# Patient Record
Sex: FEMALE | Race: WHITE | NOT HISPANIC OR LATINO | Employment: FULL TIME | RURAL
[De-identification: names, ages, dates, MRNs, and addresses within clinical notes are randomized per-mention and may not be internally consistent; named-entity substitution may affect disease eponyms.]

---

## 2019-10-09 RX ORDER — LISINOPRIL AND HYDROCHLOROTHIAZIDE 20; 12.5 MG/1; MG/1
TABLET ORAL
Qty: 30 TABLET | Refills: 0 | Status: SHIPPED | OUTPATIENT
Start: 2019-10-09 | End: 2019-11-14 | Stop reason: SDUPTHER

## 2019-10-09 RX ORDER — FLUOXETINE HYDROCHLORIDE 20 MG/1
CAPSULE ORAL
Qty: 90 CAPSULE | Refills: 12 | Status: SHIPPED | OUTPATIENT
Start: 2019-10-09 | End: 2019-11-14

## 2019-11-14 ENCOUNTER — OFFICE VISIT (OUTPATIENT)
Dept: FAMILY MEDICINE CLINIC | Facility: CLINIC | Age: 49
End: 2019-11-14

## 2019-11-14 VITALS
DIASTOLIC BLOOD PRESSURE: 82 MMHG | SYSTOLIC BLOOD PRESSURE: 120 MMHG | BODY MASS INDEX: 29.22 KG/M2 | OXYGEN SATURATION: 98 % | WEIGHT: 186.2 LBS | HEIGHT: 67 IN | TEMPERATURE: 98.7 F | HEART RATE: 93 BPM

## 2019-11-14 DIAGNOSIS — Z00.01 ENCOUNTER FOR GENERAL ADULT MEDICAL EXAMINATION WITH ABNORMAL FINDINGS: Primary | ICD-10-CM

## 2019-11-14 DIAGNOSIS — Z12.31 SCREENING MAMMOGRAM, ENCOUNTER FOR: ICD-10-CM

## 2019-11-14 DIAGNOSIS — F33.1 MODERATE EPISODE OF RECURRENT MAJOR DEPRESSIVE DISORDER (HCC): ICD-10-CM

## 2019-11-14 DIAGNOSIS — Z90.710 H/O TOTAL VAGINAL HYSTERECTOMY: ICD-10-CM

## 2019-11-14 DIAGNOSIS — F41.9 ANXIETY: ICD-10-CM

## 2019-11-14 DIAGNOSIS — L91.8 SKIN TAG: ICD-10-CM

## 2019-11-14 DIAGNOSIS — Z13.220 SCREENING FOR HYPERLIPIDEMIA: ICD-10-CM

## 2019-11-14 DIAGNOSIS — I10 ESSENTIAL HYPERTENSION: ICD-10-CM

## 2019-11-14 DIAGNOSIS — J30.89 NON-SEASONAL ALLERGIC RHINITIS DUE TO OTHER ALLERGIC TRIGGER: ICD-10-CM

## 2019-11-14 PROBLEM — F32.A DEPRESSION: Status: ACTIVE | Noted: 2019-11-14

## 2019-11-14 PROBLEM — R23.3 EASY BRUISING: Status: ACTIVE | Noted: 2017-10-18

## 2019-11-14 PROCEDURE — 99213 OFFICE O/P EST LOW 20 MIN: CPT | Performed by: FAMILY MEDICINE

## 2019-11-14 PROCEDURE — 99396 PREV VISIT EST AGE 40-64: CPT | Performed by: FAMILY MEDICINE

## 2019-11-14 RX ORDER — FLUTICASONE PROPIONATE 50 MCG
1 SPRAY, SUSPENSION (ML) NASAL DAILY
Qty: 16 G | Refills: 12 | Status: SHIPPED | OUTPATIENT
Start: 2019-11-14 | End: 2020-11-23 | Stop reason: SDUPTHER

## 2019-11-14 RX ORDER — FLUTICASONE PROPIONATE 50 MCG
1 SPRAY, SUSPENSION (ML) NASAL DAILY
COMMUNITY
End: 2019-11-14 | Stop reason: SDUPTHER

## 2019-11-14 RX ORDER — FLUOXETINE HYDROCHLORIDE 40 MG/1
40 CAPSULE ORAL DAILY
Qty: 30 CAPSULE | Refills: 12 | Status: SHIPPED | OUTPATIENT
Start: 2019-11-14 | End: 2020-11-23

## 2019-11-14 RX ORDER — LISINOPRIL AND HYDROCHLOROTHIAZIDE 20; 12.5 MG/1; MG/1
1 TABLET ORAL DAILY
Qty: 30 TABLET | Refills: 12 | Status: SHIPPED | OUTPATIENT
Start: 2019-11-14 | End: 2019-12-30

## 2019-11-14 NOTE — PROGRESS NOTES
"  Chief Complaint   Patient presents with   • Annual Exam   • Hypertension         Subjective   Radha Tobias is a 49 y.o. female here for a Well Woman Visit. Energy level is described as fair and she is sleeping well. She sleeps 8 hours nightly. Last pap was 2017 had historecomy . Contraception: none. Patient exercises not at all. Nutrition is described as in general, a \"healthy\" diet  . Her   libido is abnormal. She reports that she does not perform monthly self breast exam.      Hypertension   This is a chronic problem. The current episode started more than 1 year ago. The problem has been gradually improving since onset. The problem is controlled. Associated symptoms include anxiety. Pertinent negatives include no chest pain or shortness of breath. There are no associated agents to hypertension. There are no known risk factors for coronary artery disease. Past treatments include ACE inhibitors and diuretics. Current antihypertension treatment includes ACE inhibitors and diuretics. The current treatment provides significant improvement. There are no compliance problems.    Anxiety   Presents for follow-up visit. Symptoms include depressed mood, excessive worry and nervous/anxious behavior. Patient reports no chest pain, nausea, shortness of breath or suicidal ideas. The severity of symptoms is moderate. The quality of sleep is fair.     Her past medical history is significant for depression.   Depression   Visit Type: follow-up  Patient presents with the following symptoms: depressed mood, excessive worry and nervousness/anxiety.  Patient is not experiencing: feelings of hopelessness, feelings of worthlessness, shortness of breath, suicidal ideas, suicidal planning and thoughts of death.  Frequency of symptoms: occasionally   Severity: moderate   Sleep quality: fair           I personally reviewed and updated the patient's allergies, medications, problem list, and past medical, surgical, social, and family " history.     Allergies:  Allergies   Allergen Reactions   • Sulfa Antibiotics Palpitations       Social History:  Social History     Socioeconomic History   • Marital status: Single     Spouse name: Not on file   • Number of children: Not on file   • Years of education: Not on file   • Highest education level: Not on file   Tobacco Use   • Smoking status: Former Smoker     Last attempt to quit: 11/23/2009     Years since quitting: 10.0   • Smokeless tobacco: Never Used   Substance and Sexual Activity   • Alcohol use: Yes     Frequency: Monthly or less   • Drug use: No   • Sexual activity: Yes       Family History:  History reviewed. No pertinent family history.    Past Medical History :  Active Ambulatory Problems     Diagnosis Date Noted   • HTN (hypertension) 11/18/2013   • Easy bruising 10/18/2017   • Depression 11/14/2019   • Anxiety 11/14/2019   • H/O total vaginal hysterectomy 11/14/2019     Resolved Ambulatory Problems     Diagnosis Date Noted   • No Resolved Ambulatory Problems     No Additional Past Medical History       Medication List:    Current Outpatient Medications:   •  fluticasone (FLONASE) 50 MCG/ACT nasal spray, 1 spray into the nostril(s) as directed by provider Daily., Disp: 16 g, Rfl: 12  •  lisinopril-hydrochlorothiazide (PRINZIDE,ZESTORETIC) 20-12.5 MG per tablet, Take 1 tablet by mouth Daily., Disp: 30 tablet, Rfl: 12  •  FLUoxetine (PROzac) 40 MG capsule, Take 1 capsule by mouth Daily., Disp: 30 capsule, Rfl: 12    Past Surgical History:  History reviewed. No pertinent surgical history.    Depression Screen:   No flowsheet data found.    Fall Risk Screen:  [facility].    Review Of Systems:  Review of Systems   Constitutional: Negative for activity change and fever.   HENT: Negative for ear pain, rhinorrhea, sinus pressure and voice change.    Eyes: Negative for visual disturbance.   Respiratory: Negative for cough and shortness of breath.   "  Cardiovascular: Negative for chest pain.   Gastrointestinal: Negative for abdominal pain, diarrhea, nausea and vomiting.   Endocrine: Negative for cold intolerance and heat intolerance.   Genitourinary: Negative for frequency and urgency.   Musculoskeletal: Negative for arthralgias.   Skin: Negative for rash.   Neurological: Negative for syncope.   Hematological: Does not bruise/bleed easily.   Psychiatric/Behavioral: Positive for depressed mood. Negative for suicidal ideas. The patient is nervous/anxious.        Physical Exam:  Vital Signs:  Visit Vitals  /82   Pulse 93   Temp 98.7 °F (37.1 °C) (Oral)   Ht 170.2 cm (67.01\")   Wt 84.5 kg (186 lb 3.2 oz)   SpO2 98%   BMI 29.16 kg/m²       Physical Exam   Constitutional: She is oriented to person, place, and time. She appears well-developed and well-nourished. No distress.   HENT:   Head: Normocephalic and atraumatic.   Right Ear: External ear normal. No drainage or swelling. Tympanic membrane is not erythematous. No middle ear effusion. cerumen impaction is not present.  Left Ear: External ear normal. No drainage or swelling. Tympanic membrane is not erythematous.  No middle ear effusion. An impacted cerumen is not present.  Nose: Nose normal.   Mouth/Throat: Oropharynx is clear and moist. No oropharyngeal exudate.   Eyes: Conjunctivae and EOM are normal. Pupils are equal, round, and reactive to light. No scleral icterus.   Neck: Normal range of motion. Neck supple. No tracheal deviation present. No thyromegaly present.   Cardiovascular: Normal rate, regular rhythm, normal heart sounds and intact distal pulses. Exam reveals no friction rub.   No murmur heard.  Pulmonary/Chest: Effort normal and breath sounds normal. No stridor. No respiratory distress. She has no wheezes. She has no rales. Right breast exhibits no inverted nipple, no mass, no nipple discharge, no skin change and no tenderness. Left breast exhibits no inverted nipple, no mass, no nipple " discharge, no skin change and no tenderness.   Abdominal: Soft. Bowel sounds are normal. She exhibits no distension and no mass. There is no tenderness. No hernia.   Genitourinary: Rectum normal. Rectal exam shows guaiac negative stool.       Uterus is absent.   Cervix is absent. Right adnexum is absent.Left adnexum is absent.No erythema or tenderness in the vagina. No vaginal discharge found.   Musculoskeletal: Normal range of motion. She exhibits no edema, tenderness or deformity.   Lymphadenopathy:     She has no cervical adenopathy.   Neurological: She is alert and oriented to person, place, and time. She has normal reflexes. She displays normal reflexes. No cranial nerve deficit or sensory deficit. She exhibits normal muscle tone. Coordination and gait normal.   Skin: Skin is warm and dry. No rash noted. She is not diaphoretic.   Psychiatric: She has a normal mood and affect. Her behavior is normal.   Vitals reviewed.        Assessment and Plan:  Problem List Items Addressed This Visit        Cardiovascular and Mediastinum    HTN (hypertension)    Relevant Medications    lisinopril-hydrochlorothiazide (PRINZIDE,ZESTORETIC) 20-12.5 MG per tablet       Other    Depression    Relevant Medications    FLUoxetine (PROzac) 40 MG capsule    Anxiety    Relevant Medications    FLUoxetine (PROzac) 40 MG capsule    H/O total vaginal hysterectomy    Overview     For fibroids, one ovary on right she thinks.   2/2017           Other Visit Diagnoses     Encounter for general adult medical examination with abnormal findings    -  Primary    Relevant Orders    CBC & Differential    Comprehensive Metabolic Panel    TSH    Screening mammogram, encounter for        Relevant Orders    Mammo Screening Digital Tomosynthesis Bilateral With CAD    CHG SCREENING MAMMOGRAPHY BI 2-VIEW BREAST INC CAD    Screening for hyperlipidemia        Relevant Orders    Lipid Panel With / Chol / HDL Ratio    Non-seasonal allergic rhinitis due to other  allergic trigger        Relevant Medications    fluticasone (FLONASE) 50 MCG/ACT nasal spray    Skin tag        left vulva        Increase prozac  Good social support, no suicidal or homicidal ideation. Diagnosis and treatment discussed. Discussed counseling. Discussed medication, dosing and adverse effects. Return in 4 weeks      An After Visit Summary and PPPS were given to the patient.       Discussed wearing sunscreen, seatbelts. Avoidance or caution with alcohol. Safe sex practices discussed. Discussed screening as appropriate for age.

## 2019-12-10 ENCOUNTER — APPOINTMENT (OUTPATIENT)
Dept: MAMMOGRAPHY | Facility: HOSPITAL | Age: 49
End: 2019-12-10

## 2019-12-11 ENCOUNTER — HOSPITAL ENCOUNTER (OUTPATIENT)
Dept: OTHER | Facility: HOSPITAL | Age: 49
Discharge: HOME | End: 2019-12-11

## 2019-12-11 DIAGNOSIS — Z00.6 EXAMINATION FOR NORMAL COMPARISON OR CONTROL IN CLINICAL RESEARCH: ICD-10-CM

## 2019-12-17 ENCOUNTER — OFFICE VISIT (OUTPATIENT)
Dept: FAMILY MEDICINE CLINIC | Facility: CLINIC | Age: 49
End: 2019-12-17

## 2019-12-17 VITALS
WEIGHT: 185 LBS | RESPIRATION RATE: 14 BRPM | DIASTOLIC BLOOD PRESSURE: 85 MMHG | OXYGEN SATURATION: 99 % | BODY MASS INDEX: 29.73 KG/M2 | TEMPERATURE: 97.9 F | HEIGHT: 66 IN | SYSTOLIC BLOOD PRESSURE: 128 MMHG | HEART RATE: 92 BPM

## 2019-12-17 DIAGNOSIS — J01.00 ACUTE NON-RECURRENT MAXILLARY SINUSITIS: Primary | ICD-10-CM

## 2019-12-17 PROCEDURE — 99213 OFFICE O/P EST LOW 20 MIN: CPT | Performed by: FAMILY MEDICINE

## 2019-12-17 RX ORDER — CEPHALEXIN 500 MG/1
500 CAPSULE ORAL 3 TIMES DAILY
Qty: 30 CAPSULE | Refills: 0 | Status: SHIPPED | OUTPATIENT
Start: 2019-12-17 | End: 2020-07-06

## 2019-12-17 NOTE — PROGRESS NOTES
Subjective   Radha Tobias is a 49 y.o. female.     Chief Complaint   Patient presents with   • Sinus Problem       Sinus Problem   This is a new problem. The current episode started in the past 7 days. The problem has been gradually worsening since onset. There has been no fever. Associated symptoms include ear pain and headaches. Pertinent negatives include no chills, coughing, sinus pressure, sneezing, sore throat or swollen glands. Past treatments include oral decongestants (Mucinex). The treatment provided mild relief.        The following portions of the patient's history were reviewed and updated as appropriate: allergies, current medications, past family history, past medical history, past social history, past surgical history and problem list.    Allergies:  Allergies   Allergen Reactions   • Sulfa Antibiotics Palpitations       Social History:  Social History     Socioeconomic History   • Marital status: Single     Spouse name: Not on file   • Number of children: Not on file   • Years of education: Not on file   • Highest education level: Not on file   Tobacco Use   • Smoking status: Former Smoker     Last attempt to quit: 11/23/2009     Years since quitting: 10.0   • Smokeless tobacco: Never Used   Substance and Sexual Activity   • Alcohol use: Yes     Frequency: Monthly or less   • Drug use: No   • Sexual activity: Yes       Family History:  History reviewed. No pertinent family history.    Past Medical History :  Patient Active Problem List   Diagnosis   • HTN (hypertension)   • Easy bruising   • Depression   • Anxiety   • H/O total vaginal hysterectomy       Medication List:    Current Outpatient Medications:   •  FLUoxetine (PROzac) 40 MG capsule, Take 1 capsule by mouth Daily., Disp: 30 capsule, Rfl: 12  •  fluticasone (FLONASE) 50 MCG/ACT nasal spray, 1 spray into the nostril(s) as directed by provider Daily., Disp: 16 g, Rfl: 12  •  lisinopril-hydrochlorothiazide (PRINZIDE,ZESTORETIC) 20-12.5 MG  "per tablet, Take 1 tablet by mouth Daily., Disp: 30 tablet, Rfl: 12  •  cephalexin (KEFLEX) 500 MG capsule, Take 1 capsule by mouth 3 (Three) Times a Day., Disp: 30 capsule, Rfl: 0    Past Surgical History:  History reviewed. No pertinent surgical history.    Review of Systems:  Review of Systems   Constitutional: Negative for chills and fever.   HENT: Positive for ear pain. Negative for postnasal drip, rhinorrhea, sinus pressure, sneezing, sore throat, swollen glands and trouble swallowing.    Eyes: Negative for pain, redness and itching.   Respiratory: Negative for cough and wheezing.        Physical Exam:  Vital Signs:  Visit Vitals  /85   Pulse 92   Temp 97.9 °F (36.6 °C) (Oral)   Resp 14   Ht 167.6 cm (66\")   Wt 83.9 kg (185 lb)   SpO2 99%   BMI 29.86 kg/m²       Physical Exam   Constitutional: She appears well-developed and well-nourished.   HENT:   Head: Normocephalic and atraumatic.   Right Ear: External ear normal. Tympanic membrane is not injected, not erythematous, not retracted and not bulging. No middle ear effusion.   Left Ear: External ear normal. Tympanic membrane is not injected, not erythematous, not retracted and not bulging.  No middle ear effusion.   Nose: Nose normal. No rhinorrhea.   Mouth/Throat: Oropharynx is clear and moist. No oropharyngeal exudate.   Cardiovascular: Regular rhythm and normal heart sounds. Exam reveals no gallop and no friction rub.   No murmur heard.  Pulmonary/Chest: Effort normal and breath sounds normal. No respiratory distress. She has no wheezes. She has no rales.   Lymphadenopathy:     She has no cervical adenopathy.   Neurological: She is alert.   Skin: Skin is warm and dry.   Vitals reviewed.      Assessment and Plan:  Problem List Items Addressed This Visit     None      Visit Diagnoses     Acute non-recurrent maxillary sinusitis    -  Primary    Relevant Medications    cephalexin (KEFLEX) 500 MG capsule        increase fluids, tylenol for fever, motrin for " pain. Humidifier to help with congestion and to sleep at night. Dicussed OTC meds, gargle with warm salt water. If there is recurrent fever, shortness of breath, lethargy, advised to come in to the office or go to the ER.      An After Visit Summary and PPPS were given to the patient.

## 2019-12-29 DIAGNOSIS — I10 ESSENTIAL HYPERTENSION: ICD-10-CM

## 2019-12-30 RX ORDER — LISINOPRIL AND HYDROCHLOROTHIAZIDE 20; 12.5 MG/1; MG/1
1 TABLET ORAL DAILY
Qty: 90 TABLET | Refills: 3 | Status: SHIPPED | OUTPATIENT
Start: 2019-12-30 | End: 2021-01-12 | Stop reason: SDUPTHER

## 2020-01-20 ENCOUNTER — PROCEDURE VISIT (OUTPATIENT)
Dept: FAMILY MEDICINE CLINIC | Facility: CLINIC | Age: 50
End: 2020-01-20

## 2020-01-20 VITALS
SYSTOLIC BLOOD PRESSURE: 137 MMHG | BODY MASS INDEX: 32.44 KG/M2 | HEIGHT: 64 IN | DIASTOLIC BLOOD PRESSURE: 85 MMHG | OXYGEN SATURATION: 97 % | WEIGHT: 190 LBS | RESPIRATION RATE: 16 BRPM | HEART RATE: 95 BPM | TEMPERATURE: 98.9 F

## 2020-01-20 DIAGNOSIS — L91.8 INFLAMED SKIN TAG: Primary | ICD-10-CM

## 2020-01-20 PROCEDURE — 11200 RMVL SKIN TAGS UP TO&INC 15: CPT | Performed by: FAMILY MEDICINE

## 2020-01-20 RX ORDER — CEPHALEXIN 500 MG/1
500 CAPSULE ORAL 3 TIMES DAILY
Qty: 30 CAPSULE | Refills: 0 | Status: SHIPPED | OUTPATIENT
Start: 2020-01-20 | End: 2020-07-06

## 2020-01-20 NOTE — PROGRESS NOTES
Lesion:   Radha Tobias is here today for a lesion. The lesion appeared gradual and has been occurring for  month(s) ago.. The course has been stable. The lesion is characterized as no sign of infection but it is irritated and inflamed. The lesion is located over groin. The symptoms have been associated with pain. Left labia    Review of Systems   Skin: Positive for skin lesions.       Physical Exam   Genitourinary:               Excision Procedure Note    Pre-operative Diagnosis: irritated skin tag    Post-operative Diagnosis: normal    Locations: left lower labia    Indications: irritation, inflammation    Anesthesia: not required     Procedure Details   The risks, benefits, indications, potential complications, and alternatives were explained to the patient and informed consent obtained.    The lesion and surrounding area was given a sterile prep using betadyne and draped in the usual sterile fashion. Surgical scissors used to cut the thin stalk of the skin tag. The wound was cauterized with silver nitrate. Antibiotic ointment and a sterile dressing applied/maxi pad. The specimen was not sent for pathologic examination. The patient tolerated the procedure well.    EBL: minimal      Condition: Stable    Complications:  None    Plan:  1. Instructed to keep the wound dry and covered for 24-48 hours and clean thereafter.  2. Warning signs of infection were reviewed.    3. Recommended that the patient use OTC acetaminophen, OTC analgesics and OTC ibuprofen as needed for pain.       Problem List Items Addressed This Visit     None      Visit Diagnoses     Inflamed skin tag    -  Primary    left lower labia, antibiotic sent because of location. Recheck 1 week    Relevant Medications    cephalexin (KEFLEX) 500 MG capsule    Other Relevant Orders    Reference Histopathology

## 2020-01-20 NOTE — PATIENT INSTRUCTIONS
Excision of Skin Lesions, Care After  This sheet gives you information about how to care for yourself after your procedure. Your health care provider may also give you more specific instructions. If you have problems or questions, contact your health care provider.  What can I expect after the procedure?  After your procedure, it is common to have pain or discomfort at the excision site.  Follow these instructions at home:  Excision care    · Follow instructions from your health care provider about how to take care of your excision site. Make sure you:  ? Wash your hands with soap and water before and after you change your bandage (dressing). If soap and water are not available, use hand .  ? Change your dressing as told by your health care provider.  ? Leave stitches (sutures), skin glue, or adhesive strips in place. These skin closures may need to stay in place for 2 weeks or longer. If adhesive strip edges start to loosen and curl up, you may trim the loose edges. Do not remove adhesive strips completely unless your health care provider tells you to do that.  · Check the excision area every day for signs of infection. Watch for:  ? Redness, swelling, or pain.  ? Fluid or blood.  ? Warmth.  ? Pus or a bad smell.  · Keep the site clean, dry, and protected for at least 48 hours.  · For bleeding, apply gentle but firm pressure to the area using a folded towel for 20 minutes.  · Avoid high-impact exercise and activities until the sutures are removed or the area heals.  General instructions  · Take over-the-counter and prescription medicines only as told by your health care provider.  · Follow instructions from your health care provider about how to minimize scarring. Scarring should lessen over time.  · Avoid sun exposure until the area has healed. Use sunscreen to protect the area from the sun after it has healed.  · Keep all follow-up visits as told by your health care provider. This is important.  Contact  a health care provider if:  · You have redness, swelling, or pain around your excision site.  · You have fluid or blood coming from your excision site.  · Your excision site feels warm to the touch.  · You have pus or a bad smell coming from your excision site.  · You have a fever.  · You have pain that does not improve in 2-3 days after your procedure.  · You notice skin irregularities or changes in how you feel (sensation).  Summary  · This sheet of instructions provides you with information about caring for yourself after your procedure. Contact your health care provider if you have any problems or questions.  · Take over-the-counter and prescription medicines only as told by your health care provider.  · Change your dressing as told by your health care provider.  · Contact a health care provider if you have redness, swelling, pain, or other signs of infection around your excision site.  · Keep all follow-up visits as told by your health care provider. This is important.  This information is not intended to replace advice given to you by your health care provider. Make sure you discuss any questions you have with your health care provider.  Document Released: 05/03/2016 Document Revised: 06/26/2019 Document Reviewed: 06/26/2019  [employer] Patient Education © 2019 [employer]

## 2020-01-27 ENCOUNTER — OFFICE VISIT (OUTPATIENT)
Dept: FAMILY MEDICINE CLINIC | Facility: CLINIC | Age: 50
End: 2020-01-27

## 2020-01-27 VITALS
WEIGHT: 189 LBS | BODY MASS INDEX: 32.27 KG/M2 | OXYGEN SATURATION: 98 % | SYSTOLIC BLOOD PRESSURE: 136 MMHG | DIASTOLIC BLOOD PRESSURE: 88 MMHG | TEMPERATURE: 98.2 F | HEIGHT: 64 IN | HEART RATE: 89 BPM | RESPIRATION RATE: 18 BRPM

## 2020-01-27 DIAGNOSIS — L91.8 INFLAMED SKIN TAG: Primary | ICD-10-CM

## 2020-01-27 DIAGNOSIS — B37.31 CANDIDA VAGINITIS: ICD-10-CM

## 2020-01-27 PROCEDURE — 99213 OFFICE O/P EST LOW 20 MIN: CPT | Performed by: FAMILY MEDICINE

## 2020-01-27 RX ORDER — FLUCONAZOLE 150 MG/1
150 TABLET ORAL ONCE
Qty: 1 TABLET | Refills: 2 | Status: SHIPPED | OUTPATIENT
Start: 2020-01-27 | End: 2020-01-27

## 2020-01-27 NOTE — PROGRESS NOTES
Subjective   Radha Tobias is a 49 y.o. female.     Chief Complaint   Patient presents with   • Skin Problem       Skin Problem   This is a new problem. The current episode started more than 1 year ago. The problem occurs daily. The problem has been resolved. Pertinent negatives include no abdominal pain, arthralgias, chest pain, chills, congestion, coughing, headaches, joint swelling, myalgias, nausea, neck pain, numbness, sore throat, swollen glands, urinary symptoms, visual change, vomiting or weakness. Nothing aggravates the symptoms. Treatments tried: skin tag removed from LT labia. The treatment provided significant relief.   Vaginitis   This is a new problem. The current episode started in the past 7 days. The problem occurs constantly. The problem has been unchanged. Pertinent negatives include no abdominal pain, arthralgias, chest pain, chills, congestion, coughing, headaches, joint swelling, myalgias, nausea, neck pain, numbness, sore throat, swollen glands, urinary symptoms, visual change, vomiting or weakness. Exacerbated by: recent lesion removal. She has tried nothing for the symptoms.        The following portions of the patient's history were reviewed and updated as appropriate: allergies, current medications, past family history, past medical history, past social history, past surgical history and problem list.    Allergies:  Allergies   Allergen Reactions   • Sulfa Antibiotics Palpitations       Social History:  Social History     Socioeconomic History   • Marital status: Single     Spouse name: Not on file   • Number of children: Not on file   • Years of education: Not on file   • Highest education level: Not on file   Tobacco Use   • Smoking status: Former Smoker     Last attempt to quit: 11/23/2009     Years since quitting: 10.1   • Smokeless tobacco: Never Used   Substance and Sexual Activity   • Alcohol use: Yes     Frequency: Monthly or less   • Drug use: No   • Sexual activity: Yes  "      Family History:  History reviewed. No pertinent family history.    Past Medical History :  Patient Active Problem List   Diagnosis   • HTN (hypertension)   • Easy bruising   • Depression   • Anxiety   • H/O total vaginal hysterectomy       Medication List:    Current Outpatient Medications:   •  cephalexin (KEFLEX) 500 MG capsule, Take 1 capsule by mouth 3 (Three) Times a Day., Disp: 30 capsule, Rfl: 0  •  FLUoxetine (PROzac) 40 MG capsule, Take 1 capsule by mouth Daily., Disp: 30 capsule, Rfl: 12  •  fluticasone (FLONASE) 50 MCG/ACT nasal spray, 1 spray into the nostril(s) as directed by provider Daily., Disp: 16 g, Rfl: 12  •  lisinopril-hydrochlorothiazide (PRINZIDE,ZESTORETIC) 20-12.5 MG per tablet, TAKE 1 TABLET BY MOUTH DAILY, Disp: 90 tablet, Rfl: 3  •  cephalexin (KEFLEX) 500 MG capsule, Take 1 capsule by mouth 3 (Three) Times a Day., Disp: 30 capsule, Rfl: 0    Past Surgical History:  History reviewed. No pertinent surgical history.    Review of Systems:  Review of Systems   Constitutional: Negative for chills.   HENT: Negative for congestion, sore throat and swollen glands.    Respiratory: Negative for cough.    Cardiovascular: Negative for chest pain.   Gastrointestinal: Negative for abdominal pain, nausea and vomiting.   Musculoskeletal: Negative for arthralgias, joint swelling, myalgias and neck pain.   Neurological: Negative for weakness and numbness.       Physical Exam:  Vital Signs:  Visit Vitals  /88   Pulse 89   Temp 98.2 °F (36.8 °C) (Oral)   Resp 18   Ht 162.6 cm (64.02\")   Wt 85.7 kg (189 lb)   SpO2 98%   BMI 32.43 kg/m²       Physical Exam   Constitutional: She is oriented to person, place, and time. She appears well-developed and well-nourished. She is cooperative.   Cardiovascular: Normal rate, regular rhythm and normal heart sounds. Exam reveals no gallop and no friction rub.   No murmur heard.  Pulmonary/Chest: Effort normal and breath sounds normal. She has no wheezes. She has " no rales.   Genitourinary:       There is erythema in the vagina.   Genitourinary Comments: Erythema at introitus   Neurological: She is alert and oriented to person, place, and time. Coordination normal.   Skin: Skin is warm and dry.   Psychiatric: She has a normal mood and affect.   Vitals reviewed.      Assessment and Plan:  Problem List Items Addressed This Visit     None      Visit Diagnoses     Inflamed skin tag    -  Primary          An After Visit Summary and PPPS were given to the patient.

## 2020-04-11 PROCEDURE — U0003 INFECTIOUS AGENT DETECTION BY NUCLEIC ACID (DNA OR RNA); SEVERE ACUTE RESPIRATORY SYNDROME CORONAVIRUS 2 (SARS-COV-2) (CORONAVIRUS DISEASE [COVID-19]), AMPLIFIED PROBE TECHNIQUE, MAKING USE OF HIGH THROUGHPUT TECHNOLOGIES AS DESCRIBED BY CMS-2020-01-R: HCPCS | Performed by: FAMILY MEDICINE

## 2020-04-11 PROCEDURE — 87635 SARS-COV-2 COVID-19 AMP PRB: CPT | Performed by: FAMILY MEDICINE

## 2020-04-14 ENCOUNTER — TELEPHONE (OUTPATIENT)
Dept: URGENT CARE | Facility: CLINIC | Age: 50
End: 2020-04-14

## 2020-04-14 NOTE — TELEPHONE ENCOUNTER
Spoke with Radha. Informed her that Covid test was positive. Patient stated she still had the same symptoms she had during her visit here and stated they were about the same in severity. Patient informed to continue quarantine as directed during discharge. Verbalized understanding and denied any questions.

## 2020-07-06 ENCOUNTER — TELEPHONE (OUTPATIENT)
Dept: FAMILY MEDICINE CLINIC | Facility: CLINIC | Age: 50
End: 2020-07-06

## 2020-07-06 ENCOUNTER — HOSPITAL ENCOUNTER (OUTPATIENT)
Dept: GENERAL RADIOLOGY | Facility: HOSPITAL | Age: 50
Discharge: HOME | End: 2020-07-06
Admitting: FAMILY MEDICINE

## 2020-07-06 ENCOUNTER — OFFICE VISIT (OUTPATIENT)
Dept: FAMILY MEDICINE CLINIC | Facility: CLINIC | Age: 50
End: 2020-07-06

## 2020-07-06 VITALS
HEART RATE: 102 BPM | WEIGHT: 186.6 LBS | RESPIRATION RATE: 17 BRPM | OXYGEN SATURATION: 98 % | HEIGHT: 66 IN | DIASTOLIC BLOOD PRESSURE: 84 MMHG | BODY MASS INDEX: 29.99 KG/M2 | TEMPERATURE: 98.3 F | SYSTOLIC BLOOD PRESSURE: 138 MMHG

## 2020-07-06 DIAGNOSIS — K21.9 GASTROESOPHAGEAL REFLUX DISEASE, ESOPHAGITIS PRESENCE NOT SPECIFIED: Primary | ICD-10-CM

## 2020-07-06 DIAGNOSIS — R07.9 CHEST PAIN, UNSPECIFIED TYPE: ICD-10-CM

## 2020-07-06 DIAGNOSIS — R07.89 OTHER CHEST PAIN: ICD-10-CM

## 2020-07-06 PROCEDURE — 71046 X-RAY EXAM CHEST 2 VIEWS: CPT

## 2020-07-06 PROCEDURE — 99214 OFFICE O/P EST MOD 30 MIN: CPT | Performed by: FAMILY MEDICINE

## 2020-07-06 RX ORDER — OMEPRAZOLE 40 MG/1
40 CAPSULE, DELAYED RELEASE ORAL DAILY
Qty: 30 CAPSULE | Refills: 12 | Status: SHIPPED | OUTPATIENT
Start: 2020-07-06 | End: 2021-08-13 | Stop reason: SDUPTHER

## 2020-07-06 NOTE — PROGRESS NOTES
Subjective   Radha Tobias is a 50 y.o. female.     Chief Complaint   Patient presents with   • Chest Pain       Chest Pain    This is a new problem. The current episode started yesterday. The onset quality is sudden. The problem occurs intermittently. The problem has been gradually improving. The pain is present in the substernal region. The quality of the pain is described as dull. The pain radiates to the upper back, right jaw and right neck. Pertinent negatives include no abdominal pain, cough, fever, nausea, shortness of breath or vomiting. The pain is aggravated by nothing. She has tried antacids for the symptoms. The treatment provided no relief.   Her family medical history is significant for heart disease.        The following portions of the patient's history were reviewed and updated as appropriate: allergies, current medications, past family history, past medical history, past social history, past surgical history and problem list.    Allergies:  Allergies   Allergen Reactions   • Sulfa Antibiotics Palpitations       Social History:  Social History     Socioeconomic History   • Marital status: Single     Spouse name: Not on file   • Number of children: Not on file   • Years of education: Not on file   • Highest education level: Not on file   Tobacco Use   • Smoking status: Former Smoker     Last attempt to quit: 11/23/2009     Years since quitting: 10.6   • Smokeless tobacco: Never Used   Substance and Sexual Activity   • Alcohol use: Yes     Frequency: Monthly or less     Comment: weekly   • Drug use: No   • Sexual activity: Yes       Family History:  History reviewed. No pertinent family history.    Past Medical History :  Patient Active Problem List   Diagnosis   • HTN (hypertension)   • Easy bruising   • Depression   • Anxiety   • H/O total vaginal hysterectomy   • GERD (gastroesophageal reflux disease)   • Other chest pain       Medication List:  Outpatient Encounter Medications as of 7/6/2020  "  Medication Sig Dispense Refill   • FLUoxetine (PROzac) 40 MG capsule Take 1 capsule by mouth Daily. 30 capsule 12   • fluticasone (FLONASE) 50 MCG/ACT nasal spray 1 spray into the nostril(s) as directed by provider Daily. 16 g 12   • lisinopril-hydrochlorothiazide (PRINZIDE,ZESTORETIC) 20-12.5 MG per tablet TAKE 1 TABLET BY MOUTH DAILY 90 tablet 3   • omeprazole (priLOSEC) 40 MG capsule Take 1 capsule by mouth Daily. 30 capsule 12   • [DISCONTINUED] cephalexin (KEFLEX) 500 MG capsule Take 1 capsule by mouth 3 (Three) Times a Day. 30 capsule 0   • [DISCONTINUED] cephalexin (KEFLEX) 500 MG capsule Take 1 capsule by mouth 3 (Three) Times a Day. 30 capsule 0     No facility-administered encounter medications on file as of 7/6/2020.        Past Surgical History:  Past Surgical History:   Procedure Laterality Date   • APPENDECTOMY     • HYSTERECTOMY     • TUBAL ABDOMINAL LIGATION         Review of Systems:  Review of Systems   Constitutional: Negative for activity change and fever.   HENT: Negative for ear pain, rhinorrhea, sinus pressure and voice change.    Eyes: Negative for visual disturbance.   Respiratory: Negative for cough and shortness of breath.    Cardiovascular: Positive for chest pain.   Gastrointestinal: Negative for abdominal pain, diarrhea, nausea and vomiting.   Endocrine: Negative for cold intolerance and heat intolerance.   Genitourinary: Negative for frequency and urgency.   Musculoskeletal: Negative for arthralgias.   Skin: Negative for rash.   Neurological: Negative for syncope.   Hematological: Does not bruise/bleed easily.   Psychiatric/Behavioral: Negative for depressed mood. The patient is not nervous/anxious.        I have reviewed and confirmed the accuracy of the ROS as documented by the MA/LPN/RN Julianne Glez MD    Vital Signs:  Visit Vitals  /84   Pulse 102   Temp 98.3 °F (36.8 °C)   Resp 17   Ht 167.6 cm (66\")   Wt 84.6 kg (186 lb 9.6 oz)   SpO2 98%   BMI 30.12 kg/m² "         Physical Exam   Constitutional: She is oriented to person, place, and time. She appears well-developed and well-nourished. She is cooperative.   Cardiovascular: Normal rate, regular rhythm and normal heart sounds. Exam reveals no gallop and no friction rub.   No murmur heard.  Pulmonary/Chest: Effort normal and breath sounds normal. She has no wheezes. She has no rales.   Abdominal: Soft. Bowel sounds are normal. She exhibits no distension. There is no tenderness. There is no rebound and no guarding.   Neurological: She is alert and oriented to person, place, and time. Coordination normal.   Skin: Skin is warm and dry.   Psychiatric: She has a normal mood and affect.   Vitals reviewed.      ECG 12 Lead  Date/Time: 7/12/2020 8:02 PM  Performed by: Julianne Glez MD  Authorized by: Julianne Glez MD   Previous ECG: no previous ECG available  Rhythm: sinus rhythm  Rate: normal  Conduction: conduction normal  ST Segments: ST segments normal  T Waves: T waves normal  QRS axis: normal  Other: no other findings    Clinical impression: normal ECG            Assessment and Plan:  Problem List Items Addressed This Visit        Digestive    GERD (gastroesophageal reflux disease) - Primary    Overview     May be cause of chest pain.   Limit tobacco, alcohol, caffeine, chocalate, citrus fruits, recumbency after meals and large portions. Discussed link between PPI's and increased risk of hip, wrist, and spine fractures           Relevant Medications    omeprazole (priLOSEC) 40 MG capsule       Nervous and Auditory    Other chest pain    Current Assessment & Plan     Labs, EKG were negative. No s/s of acute coronary syndrome. Will order stress test         Relevant Orders    Troponin I (Completed)    CK Total & CKMB (Completed)    ECG 12 Lead    XR Chest 2 View (Completed)    Treadmill Stress Test        I wore protective equipment throughout this patient encounter to include mask and gloves. Hand hygiene was  performed before donning protective equipment and after removal when leaving the room.      An After Visit Summary and PPPS were given to the patient.

## 2020-07-12 PROCEDURE — 93000 ELECTROCARDIOGRAM COMPLETE: CPT | Performed by: FAMILY MEDICINE

## 2020-11-23 ENCOUNTER — E-VISIT (OUTPATIENT)
Dept: FAMILY MEDICINE CLINIC | Facility: CLINIC | Age: 50
End: 2020-11-23

## 2020-11-23 ENCOUNTER — TELEPHONE (OUTPATIENT)
Dept: FAMILY MEDICINE CLINIC | Facility: CLINIC | Age: 50
End: 2020-11-23

## 2020-11-23 DIAGNOSIS — J30.89 NON-SEASONAL ALLERGIC RHINITIS DUE TO OTHER ALLERGIC TRIGGER: ICD-10-CM

## 2020-11-23 DIAGNOSIS — J01.80 ACUTE NON-RECURRENT SINUSITIS OF OTHER SINUS: Primary | ICD-10-CM

## 2020-11-23 DIAGNOSIS — F41.9 ANXIETY: ICD-10-CM

## 2020-11-23 PROBLEM — J01.90 ACUTE SINUSITIS, UNSPECIFIED: Status: ACTIVE | Noted: 2020-11-23

## 2020-11-23 PROCEDURE — 99421 OL DIG E/M SVC 5-10 MIN: CPT | Performed by: FAMILY MEDICINE

## 2020-11-23 RX ORDER — CEPHALEXIN 500 MG/1
500 CAPSULE ORAL 3 TIMES DAILY
Qty: 30 CAPSULE | Refills: 0 | Status: SHIPPED | OUTPATIENT
Start: 2020-11-23 | End: 2020-12-07

## 2020-11-23 RX ORDER — FLUOXETINE HYDROCHLORIDE 40 MG/1
40 CAPSULE ORAL DAILY
Qty: 30 CAPSULE | Refills: 12 | Status: SHIPPED | OUTPATIENT
Start: 2020-11-23 | End: 2021-03-23

## 2020-11-23 RX ORDER — FLUTICASONE PROPIONATE 50 MCG
1 SPRAY, SUSPENSION (ML) NASAL DAILY
Qty: 16 G | Refills: 12 | Status: SHIPPED | OUTPATIENT
Start: 2020-11-23 | End: 2021-12-17

## 2020-11-23 NOTE — PROGRESS NOTES
Radha Tobias    1970  [id number]    I have reviewed the e-Visit questionnaire and patient's answers, my assessment and plan are as follows:        Symptoms are consistent with sinusitis.       Problems Addressed this Visit        Respiratory    Acute sinusitis, unspecified - Primary    Relevant Medications    cephalexin (KEFLEX) 500 MG capsule      Diagnoses       Codes Comments    Acute non-recurrent sinusitis of other sinus    -  Primary ICD-10-CM: J01.80  ICD-9-CM: 461.8          increase fluids, tylenol for fever, motrin for pain. Humidifier to help with congestion and to sleep at night. Dicussed OTC meds, gargle with warm salt water. If there is recurrent fever, shortness of breath, lethargy, advised to come in to the office or go to the ER.      Time spent 5 minutes      Any medications prescribed have been sent electronically to   [id number], [place] - [address] [facility] [address] [facility] - [phone]  - [phone]   [address] [facility] [place] [address]  Phone: [phone] Fax: [phone]    [facility] [address] [place] [address], [place] - [address] - [phone]  - [phone] [address]  [address]  [place] [address]  Phone: [phone] Fax: [phone]        Julianne Glez MD  11/23/20  17:48 EST

## 2020-12-07 ENCOUNTER — OFFICE VISIT (OUTPATIENT)
Dept: FAMILY MEDICINE CLINIC | Facility: CLINIC | Age: 50
End: 2020-12-07

## 2020-12-07 VITALS
OXYGEN SATURATION: 99 % | SYSTOLIC BLOOD PRESSURE: 114 MMHG | RESPIRATION RATE: 16 BRPM | HEIGHT: 66 IN | HEART RATE: 84 BPM | WEIGHT: 187.4 LBS | DIASTOLIC BLOOD PRESSURE: 70 MMHG | BODY MASS INDEX: 30.12 KG/M2 | TEMPERATURE: 98.2 F

## 2020-12-07 DIAGNOSIS — L50.9 URTICARIA: Primary | ICD-10-CM

## 2020-12-07 DIAGNOSIS — H65.03 NON-RECURRENT ACUTE SEROUS OTITIS MEDIA OF BOTH EARS: ICD-10-CM

## 2020-12-07 PROBLEM — J01.90 ACUTE SINUSITIS, UNSPECIFIED: Status: RESOLVED | Noted: 2020-11-23 | Resolved: 2020-12-07

## 2020-12-07 PROBLEM — R07.89 OTHER CHEST PAIN: Status: RESOLVED | Noted: 2020-07-06 | Resolved: 2020-12-07

## 2020-12-07 PROCEDURE — 99213 OFFICE O/P EST LOW 20 MIN: CPT | Performed by: FAMILY MEDICINE

## 2020-12-07 RX ORDER — AZITHROMYCIN 250 MG/1
TABLET, FILM COATED ORAL
Qty: 6 TABLET | Refills: 0 | Status: SHIPPED | OUTPATIENT
Start: 2020-12-07 | End: 2021-02-23

## 2020-12-07 RX ORDER — PREDNISONE 1 MG/1
5 TABLET ORAL DAILY
Qty: 45 TABLET | Refills: 0 | Status: SHIPPED | OUTPATIENT
Start: 2020-12-07 | End: 2021-02-23

## 2020-12-07 NOTE — PROGRESS NOTES
Subjective   Radha Tobias is a 50 y.o. female.     Chief Complaint   Patient presents with   • Facial Swelling   • Urticaria       Facial Swelling  This is a new problem. The current episode started in the past 7 days (4 nights since Friday night ). The problem occurs intermittently. The problem has been waxing and waning. Associated symptoms include congestion and fatigue. Pertinent negatives include no abdominal pain, arthralgias, chest pain, coughing, fever, headaches, nausea, rash, sore throat, swollen glands, vertigo, visual change or vomiting. Nothing aggravates the symptoms. Treatments tried: Benadryl. The treatment provided no relief.   Urticaria  This is a new problem. The current episode started in the past 7 days (4 nights since Friday night ). The problem has been waxing and waning since onset. The affected locations include the face, torso, neck, right buttock, left buttock, back and abdomen. The rash is characterized by itchiness, redness and swelling. It is unknown if there was an exposure to a precipitant. Associated symptoms include congestion and fatigue. Pertinent negatives include no cough, diarrhea, fever, rhinorrhea, shortness of breath, sore throat or vomiting. Past treatments include antihistamine. The treatment provided no relief.        The following portions of the patient's history were reviewed and updated as appropriate: allergies, current medications, past family history, past medical history, past social history, past surgical history and problem list.    Allergies:  Allergies   Allergen Reactions   • Sulfa Antibiotics Palpitations       Social History:  Social History     Socioeconomic History   • Marital status: Single     Spouse name: Not on file   • Number of children: Not on file   • Years of education: Not on file   • Highest education level: Not on file   Tobacco Use   • Smoking status: Former Smoker     Quit date: 2009     Years since quittin.0   • Smokeless  tobacco: Never Used   Substance and Sexual Activity   • Alcohol use: Yes     Frequency: Monthly or less     Comment: weekly   • Drug use: No   • Sexual activity: Yes       Family History:  History reviewed. No pertinent family history.    Past Medical History :  Patient Active Problem List   Diagnosis   • Essential hypertension   • Easy bruising   • Depression   • Anxiety   • H/O total vaginal hysterectomy   • GERD (gastroesophageal reflux disease)   • Urticaria   • Non-recurrent acute serous otitis media of both ears       Medication List:    Current Outpatient Medications:   •  FLUoxetine (PROzac) 40 MG capsule, TAKE 1 CAPSULE BY MOUTH DAILY, Disp: 30 capsule, Rfl: 12  •  fluticasone (FLONASE) 50 MCG/ACT nasal spray, 1 spray into the nostril(s) as directed by provider Daily., Disp: 16 g, Rfl: 12  •  lisinopril-hydrochlorothiazide (PRINZIDE,ZESTORETIC) 20-12.5 MG per tablet, TAKE 1 TABLET BY MOUTH DAILY, Disp: 90 tablet, Rfl: 3  •  omeprazole (priLOSEC) 40 MG capsule, Take 1 capsule by mouth Daily., Disp: 30 capsule, Rfl: 12  •  azithromycin (ZITHROMAX) 250 MG tablet, Take 2 tablets the first day, then 1 tablet daily for 4 days., Disp: 6 tablet, Rfl: 0  •  predniSONE (DELTASONE) 5 MG tablet, Take 1 tablet by mouth Daily. 40mg x 3 days, 20mg x 3 days, 10mg x 3 days, 5mg x 3 days, Disp: 45 tablet, Rfl: 0    Past Surgical History:  Past Surgical History:   Procedure Laterality Date   • APPENDECTOMY     • HYSTERECTOMY     • TUBAL ABDOMINAL LIGATION         Review of Systems:  Review of Systems   Constitutional: Positive for fatigue. Negative for activity change and fever.   HENT: Positive for congestion and facial swelling. Negative for ear pain, rhinorrhea, sinus pressure, sore throat, swollen glands and voice change.    Eyes: Negative for visual disturbance.   Respiratory: Negative for cough and shortness of breath.    Cardiovascular: Negative for chest pain.   Gastrointestinal: Negative for abdominal pain, diarrhea,  "nausea and vomiting.   Endocrine: Negative for cold intolerance and heat intolerance.   Genitourinary: Negative for frequency and urgency.   Musculoskeletal: Negative for arthralgias.   Skin: Negative for rash.   Neurological: Negative for vertigo and syncope.   Hematological: Does not bruise/bleed easily.   Psychiatric/Behavioral: Negative for depressed mood. The patient is not nervous/anxious.        Physical Exam:  Vital Signs:  Visit Vitals  /70 (BP Location: Left arm)   Pulse 84   Temp 98.2 °F (36.8 °C) (Temporal)   Resp 16   Ht 167.6 cm (66\")   Wt 85 kg (187 lb 6.4 oz)   SpO2 99%   BMI 30.25 kg/m²       Physical Exam  Vitals signs reviewed.   Constitutional:       Appearance: She is well-developed.   HENT:      Head: Normocephalic and atraumatic.      Right Ear: External ear normal. No decreased hearing noted. No tenderness. A middle ear effusion is present. Tympanic membrane is not injected, erythematous, retracted or bulging.      Left Ear: External ear normal. No decreased hearing noted. No tenderness. A middle ear effusion is present. Tympanic membrane is not injected, erythematous, retracted or bulging.      Nose: Nose normal. No rhinorrhea.      Mouth/Throat:      Pharynx: No oropharyngeal exudate or posterior oropharyngeal erythema.   Eyes:      General:         Right eye: No discharge.         Left eye: No discharge.   Cardiovascular:      Rate and Rhythm: Normal rate and regular rhythm.      Heart sounds: Normal heart sounds. No murmur. No friction rub. No gallop.    Pulmonary:      Effort: Pulmonary effort is normal. No respiratory distress.      Breath sounds: Normal breath sounds. No wheezing or rales.   Lymphadenopathy:      Cervical: No cervical adenopathy.   Skin:     General: Skin is warm and dry.      Findings: No rash.      Comments: Abdomen, back, face with wheel and flare. Eye lids slightly puffy   Neurological:      Mental Status: She is alert and oriented to person, place, and time. "         Assessment and Plan:  Problems Addressed this Visit        Nervous and Auditory    Non-recurrent acute serous otitis media of both ears     increase fluids, tylenol for fever, motrin for pain. Humidifier to help with congestion and to sleep at night. Dicussed OTC meds, gargle with warm salt water. If there is recurrent fever, shortness of breath, lethargy, advised to come in to the office or go to the ER.           Relevant Medications    azithromycin (ZITHROMAX) 250 MG tablet       Musculoskeletal and Integument    Urticaria - Primary     Discussed risks of steroids: hyperglycemia, osteoporosis, avascular necrosis, anxiety, insomnia and cataracts. Patient states understanding           Relevant Medications    predniSONE (DELTASONE) 5 MG tablet      Diagnoses       Codes Comments    Urticaria    -  Primary ICD-10-CM: L50.9  ICD-9-CM: 708.9     Non-recurrent acute serous otitis media of both ears     ICD-10-CM: H65.03  ICD-9-CM: 381.01            An After Visit Summary and PPPS were given to the patient.             I wore protective equipment throughout this patient encounter to include mask, gloves and eye protection. Hand hygiene was performed before donning protective equipment and after removal when leaving the room.

## 2020-12-07 NOTE — ASSESSMENT & PLAN NOTE
Discussed risks of steroids: hyperglycemia, osteoporosis, avascular necrosis, anxiety, insomnia and cataracts. Patient states understanding

## 2021-01-12 ENCOUNTER — TELEPHONE (OUTPATIENT)
Dept: FAMILY MEDICINE CLINIC | Facility: CLINIC | Age: 51
End: 2021-01-12

## 2021-01-12 DIAGNOSIS — I10 ESSENTIAL HYPERTENSION: ICD-10-CM

## 2021-01-14 RX ORDER — LISINOPRIL AND HYDROCHLOROTHIAZIDE 20; 12.5 MG/1; MG/1
1 TABLET ORAL DAILY
Qty: 90 TABLET | Refills: 3 | Status: SHIPPED | OUTPATIENT
Start: 2021-01-14 | End: 2021-01-21

## 2021-01-21 ENCOUNTER — TELEPHONE (OUTPATIENT)
Dept: FAMILY MEDICINE CLINIC | Facility: CLINIC | Age: 51
End: 2021-01-21

## 2021-01-21 DIAGNOSIS — I10 ESSENTIAL HYPERTENSION: ICD-10-CM

## 2021-01-21 RX ORDER — LISINOPRIL AND HYDROCHLOROTHIAZIDE 20; 12.5 MG/1; MG/1
1 TABLET ORAL DAILY
Qty: 30 TABLET | Refills: 12 | Status: SHIPPED | OUTPATIENT
Start: 2021-01-21 | End: 2021-02-08 | Stop reason: SDUPTHER

## 2021-02-08 ENCOUNTER — TELEPHONE (OUTPATIENT)
Dept: FAMILY MEDICINE CLINIC | Facility: CLINIC | Age: 51
End: 2021-02-08

## 2021-02-08 DIAGNOSIS — I10 ESSENTIAL HYPERTENSION: ICD-10-CM

## 2021-02-08 RX ORDER — LISINOPRIL AND HYDROCHLOROTHIAZIDE 20; 12.5 MG/1; MG/1
1 TABLET ORAL DAILY
Qty: 30 TABLET | Refills: 12 | Status: SHIPPED | OUTPATIENT
Start: 2021-02-08 | End: 2022-04-19 | Stop reason: SDUPTHER

## 2021-02-23 ENCOUNTER — TELEPHONE (OUTPATIENT)
Dept: FAMILY MEDICINE CLINIC | Facility: CLINIC | Age: 51
End: 2021-02-23

## 2021-02-23 ENCOUNTER — E-VISIT (OUTPATIENT)
Dept: FAMILY MEDICINE CLINIC | Facility: CLINIC | Age: 51
End: 2021-02-23

## 2021-02-23 DIAGNOSIS — J01.00 ACUTE NON-RECURRENT MAXILLARY SINUSITIS: Primary | ICD-10-CM

## 2021-02-23 PROCEDURE — 99421 OL DIG E/M SVC 5-10 MIN: CPT | Performed by: FAMILY MEDICINE

## 2021-02-23 RX ORDER — CEPHALEXIN 500 MG/1
500 CAPSULE ORAL 3 TIMES DAILY
Qty: 30 CAPSULE | Refills: 0 | Status: SHIPPED | OUTPATIENT
Start: 2021-02-23 | End: 2021-03-23

## 2021-02-23 NOTE — PROGRESS NOTES
Radha LEE Aggiejose angel    1970  [id number]    I have reviewed the e-Visit questionnaire and patient's answers, my assessment and plan are as follows:              Problems Addressed this Visit        ENT    Acute non-recurrent maxillary sinusitis - Primary     increase fluids, tylenol for fever, motrin for pain. Humidifier to help with congestion and to sleep at night. Dicussed OTC meds, gargle with warm salt water. If there is recurrent fever, shortness of breath, lethargy, advised to come in to the office or go to the ER.           Relevant Medications    cephalexin (KEFLEX) 500 MG capsule      Diagnoses       Codes Comments    Acute non-recurrent maxillary sinusitis    -  Primary ICD-10-CM: J01.00  ICD-9-CM: 461.0            Any medications prescribed have been sent electronically to   [facility] #[id number] - [place], [place] - [address] [facility] [facility] - [phone] PH - [phone]   [address] [facility] [place] [address]  Phone: [phone] Fax: [phone]    [facility] [address] [place] [address] - [address] - [phone]  - [phone] [address]  [address]  [place] [address]  Phone: [phone] Fax: [phone]        Julianne Glez MD  02/23/21  17:39 EST

## 2021-03-23 ENCOUNTER — OFFICE VISIT (OUTPATIENT)
Dept: FAMILY MEDICINE CLINIC | Facility: CLINIC | Age: 51
End: 2021-03-23

## 2021-03-23 VITALS
TEMPERATURE: 98 F | HEIGHT: 66 IN | RESPIRATION RATE: 18 BRPM | SYSTOLIC BLOOD PRESSURE: 122 MMHG | DIASTOLIC BLOOD PRESSURE: 84 MMHG | WEIGHT: 194.6 LBS | HEART RATE: 117 BPM | BODY MASS INDEX: 31.27 KG/M2 | OXYGEN SATURATION: 98 %

## 2021-03-23 DIAGNOSIS — M77.02 MEDIAL EPICONDYLITIS OF LEFT ELBOW: ICD-10-CM

## 2021-03-23 DIAGNOSIS — F33.1 MODERATE EPISODE OF RECURRENT MAJOR DEPRESSIVE DISORDER (HCC): Primary | ICD-10-CM

## 2021-03-23 PROBLEM — D49.7 PITUITARY NEOPLASM: Status: ACTIVE | Noted: 2021-03-23

## 2021-03-23 PROBLEM — J01.00 ACUTE NON-RECURRENT MAXILLARY SINUSITIS: Status: RESOLVED | Noted: 2020-11-23 | Resolved: 2021-03-23

## 2021-03-23 PROBLEM — L50.9 URTICARIA: Status: RESOLVED | Noted: 2020-12-07 | Resolved: 2021-03-23

## 2021-03-23 PROCEDURE — 99213 OFFICE O/P EST LOW 20 MIN: CPT | Performed by: FAMILY MEDICINE

## 2021-03-23 RX ORDER — NAPROXEN 500 MG/1
500 TABLET ORAL 2 TIMES DAILY PRN
Qty: 60 TABLET | Refills: 2 | Status: SHIPPED | OUTPATIENT
Start: 2021-03-23 | End: 2021-12-17

## 2021-03-23 RX ORDER — BUPROPION HYDROCHLORIDE 150 MG/1
150 TABLET ORAL DAILY
Qty: 30 TABLET | Refills: 2 | Status: SHIPPED | OUTPATIENT
Start: 2021-03-23 | End: 2021-07-22 | Stop reason: SDUPTHER

## 2021-03-23 NOTE — PROGRESS NOTES
Subjective   Radha Tobias is a 50 y.o. female.     Chief Complaint   Patient presents with   • Hypertension   • Arm Pain       Arm Pain   The incident occurred more than 1 week ago. The injury mechanism is unknown. The pain is present in the left elbow, left fingers and left wrist. The quality of the pain is described as burning and aching. The pain does not radiate. The pain has been fluctuating since the incident. Associated symptoms include numbness. Pertinent negatives include no chest pain, muscle weakness or tingling. Nothing aggravates the symptoms. She has tried nothing for the symptoms.   Hypertension  The current episode started more than 1 year ago. The problem has been waxing and waning since onset. The problem is controlled. Pertinent negatives include no blurred vision, chest pain, headaches, malaise/fatigue, palpitations, shortness of breath or sweats. There are no associated agents to hypertension. There are no known risk factors for coronary artery disease. Past treatments include nothing. Current antihypertension treatment includes diuretics and ACE inhibitors. The current treatment provides moderate improvement. There are no compliance problems.         I personally reviewed and updated the patient's allergies, medications, problem list, and past medical, surgical, social, and family history. I have reviewed and confirmed the accuracy of the History of Present Illness and Review of Symptoms as documented by the MA/LPN/RN. Julianne Glez MD    Allergies:  Allergies   Allergen Reactions   • Sulfa Antibiotics Palpitations       Social History:  Social History     Socioeconomic History   • Marital status: Single     Spouse name: Not on file   • Number of children: Not on file   • Years of education: Not on file   • Highest education level: Not on file   Tobacco Use   • Smoking status: Former Smoker     Quit date: 2009     Years since quittin.3   • Smokeless tobacco: Never Used    Substance and Sexual Activity   • Alcohol use: Yes     Comment: weekly   • Drug use: No   • Sexual activity: Yes       Family History:  History reviewed. No pertinent family history.    Past Medical History :  Patient Active Problem List   Diagnosis   • Essential hypertension   • Easy bruising   • Mild episode of recurrent major depressive disorder (CMS/HCC)   • Anxiety   • H/O total vaginal hysterectomy   • GERD (gastroesophageal reflux disease)   • Non-recurrent acute serous otitis media of both ears   • Pituitary neoplasm   • Medial epicondylitis of left elbow       Medication List:    Current Outpatient Medications:   •  fluticasone (FLONASE) 50 MCG/ACT nasal spray, 1 spray into the nostril(s) as directed by provider Daily., Disp: 16 g, Rfl: 12  •  lisinopril-hydrochlorothiazide (PRINZIDE,ZESTORETIC) 20-12.5 MG per tablet, TAKE 1 TABLET BY MOUTH DAILY, Disp: 30 tablet, Rfl: 12  •  omeprazole (priLOSEC) 40 MG capsule, Take 1 capsule by mouth Daily., Disp: 30 capsule, Rfl: 12  •  buPROPion XL (WELLBUTRIN XL) 150 MG 24 hr tablet, Take 1 tablet by mouth Daily., Disp: 30 tablet, Rfl: 2  •  naproxen (NAPROSYN) 500 MG tablet, Take 1 tablet by mouth 2 (Two) Times a Day As Needed for Mild Pain ., Disp: 60 tablet, Rfl: 2    Past Surgical History:  Past Surgical History:   Procedure Laterality Date   • APPENDECTOMY     • HYSTERECTOMY     • TUBAL ABDOMINAL LIGATION         Review of Systems:  Review of Systems   Constitutional: Negative for activity change, fever and malaise/fatigue.   HENT: Negative for ear pain, rhinorrhea, sinus pressure and voice change.    Eyes: Negative for blurred vision and visual disturbance.   Respiratory: Negative for cough and shortness of breath.    Cardiovascular: Negative for chest pain and palpitations.   Gastrointestinal: Negative for abdominal pain, diarrhea, nausea and vomiting.   Endocrine: Negative for cold intolerance and heat intolerance.   Genitourinary: Negative for frequency and  "urgency.   Musculoskeletal: Negative for arthralgias.   Skin: Negative for rash.   Neurological: Positive for numbness. Negative for tingling and syncope.   Hematological: Does not bruise/bleed easily.   Psychiatric/Behavioral: Negative for depressed mood. The patient is not nervous/anxious.        Physical Exam:  Vital Signs:  Vital Signs:   /84   Pulse 117   Temp 98 °F (36.7 °C)   Resp 18   Ht 167.6 cm (66\")   Wt 88.3 kg (194 lb 9.6 oz)   SpO2 98%   BMI 31.41 kg/m²     Result Review :                Physical Exam  Vitals reviewed.   Constitutional:       Appearance: Normal appearance. She is well-developed.   HENT:      Head: Normocephalic and atraumatic.   Eyes:      General:         Right eye: No discharge.         Left eye: No discharge.   Cardiovascular:      Rate and Rhythm: Normal rate and regular rhythm.      Heart sounds: Normal heart sounds. No murmur heard.   No friction rub. No gallop.    Pulmonary:      Effort: Pulmonary effort is normal. No respiratory distress.      Breath sounds: Normal breath sounds. No wheezing or rales.   Musculoskeletal:      Left elbow: Tenderness present in medial epicondyle.   Skin:     General: Skin is warm and dry.      Findings: No rash.   Neurological:      Mental Status: She is alert and oriented to person, place, and time.      Coordination: Coordination normal.      Gait: Gait normal.   Psychiatric:         Behavior: Behavior is cooperative.         Assessment and Plan:  Problems Addressed this Visit        Mental Health    Mild episode of recurrent major depressive disorder (CMS/HCC) - Primary     Patient's depression is recurrent and is mild without psychosis. Their depression is currently active and the condition is unchanged. This will be reassessed at the next regular appointment. F/U as described:patient will continue current medication therapy.         Relevant Medications    buPROPion XL (WELLBUTRIN XL) 150 MG 24 hr tablet       Musculoskeletal and " Injuries    Medial epicondylitis of left elbow     Start nsaids as needed. Discussed strap to get from pharmacy. Use ice prn. Will follow up in a few weeks.          Relevant Medications    naproxen (NAPROSYN) 500 MG tablet      Diagnoses       Codes Comments    Moderate episode of recurrent major depressive disorder (CMS/[facility])    -  Primary ICD-10-CM: F33.1  ICD-9-CM: 296.32     Medial epicondylitis of left elbow     ICD-10-CM: M77.02  ICD-9-CM: 726.31            An After Visit Summary and PPPS were given to the patient.         I wore protective equipment throughout this patient encounter to include mask and gloves. Hand hygiene was performed before donning protective equipment and after removal when leaving the room.

## 2021-03-25 ENCOUNTER — TELEPHONE (OUTPATIENT)
Dept: FAMILY MEDICINE CLINIC | Facility: CLINIC | Age: 51
End: 2021-03-25

## 2021-03-25 PROBLEM — F33.0 MILD EPISODE OF RECURRENT MAJOR DEPRESSIVE DISORDER: Status: ACTIVE | Noted: 2019-11-14

## 2021-03-25 NOTE — ASSESSMENT & PLAN NOTE
Patient's depression is recurrent and is mild without psychosis. Their depression is currently active and the condition is unchanged. This will be reassessed at the next regular appointment. F/U as described:patient will continue current medication therapy.

## 2021-03-25 NOTE — ASSESSMENT & PLAN NOTE
Start nsaids as needed. Discussed strap to get from pharmacy. Use ice prn. Will follow up in a few weeks.

## 2021-03-25 NOTE — TELEPHONE ENCOUNTER
Can you call [facility] and get MRI of her head. She has hx of a pituitary tumor or adenoma

## 2021-04-20 ENCOUNTER — OFFICE VISIT (OUTPATIENT)
Dept: FAMILY MEDICINE CLINIC | Facility: CLINIC | Age: 51
End: 2021-04-20

## 2021-04-20 VITALS
HEART RATE: 93 BPM | WEIGHT: 195.8 LBS | HEIGHT: 66 IN | DIASTOLIC BLOOD PRESSURE: 76 MMHG | RESPIRATION RATE: 18 BRPM | SYSTOLIC BLOOD PRESSURE: 122 MMHG | BODY MASS INDEX: 31.47 KG/M2 | TEMPERATURE: 98.9 F | OXYGEN SATURATION: 98 %

## 2021-04-20 DIAGNOSIS — I10 ESSENTIAL HYPERTENSION: ICD-10-CM

## 2021-04-20 DIAGNOSIS — R68.82 LOW LIBIDO: ICD-10-CM

## 2021-04-20 DIAGNOSIS — D35.2 PITUITARY ADENOMA (HCC): Primary | ICD-10-CM

## 2021-04-20 DIAGNOSIS — Z12.31 SCREENING MAMMOGRAM, ENCOUNTER FOR: ICD-10-CM

## 2021-04-20 DIAGNOSIS — Z13.220 SCREENING FOR HYPERLIPIDEMIA: ICD-10-CM

## 2021-04-20 DIAGNOSIS — Z00.01 ENCOUNTER FOR ROUTINE ADULT PHYSICAL EXAM WITH ABNORMAL FINDINGS: ICD-10-CM

## 2021-04-20 DIAGNOSIS — Z90.710 H/O TOTAL VAGINAL HYSTERECTOMY: ICD-10-CM

## 2021-04-20 DIAGNOSIS — Z12.4 SCREENING FOR CERVICAL CANCER: ICD-10-CM

## 2021-04-20 PROBLEM — H65.03 NON-RECURRENT ACUTE SEROUS OTITIS MEDIA OF BOTH EARS: Status: RESOLVED | Noted: 2020-12-07 | Resolved: 2021-04-20

## 2021-04-20 LAB
BILIRUB BLD-MCNC: NEGATIVE MG/DL
CLARITY, POC: CLEAR
COLOR UR: YELLOW
GLUCOSE UR STRIP-MCNC: NEGATIVE MG/DL
KETONES UR QL: NEGATIVE
LEUKOCYTE EST, POC: NEGATIVE
NITRITE UR-MCNC: NEGATIVE MG/ML
PH UR: 6 [PH] (ref 5–8)
PROT UR STRIP-MCNC: NEGATIVE MG/DL
RBC # UR STRIP: NEGATIVE /UL
SP GR UR: 1.01 (ref 1–1.03)
UROBILINOGEN UR QL: NORMAL

## 2021-04-20 PROCEDURE — 99212 OFFICE O/P EST SF 10 MIN: CPT | Performed by: FAMILY MEDICINE

## 2021-04-20 PROCEDURE — 99396 PREV VISIT EST AGE 40-64: CPT | Performed by: FAMILY MEDICINE

## 2021-04-20 PROCEDURE — 81003 URINALYSIS AUTO W/O SCOPE: CPT | Performed by: FAMILY MEDICINE

## 2021-04-20 NOTE — PROGRESS NOTES
"  Chief Complaint   Patient presents with   • Annual Exam   • Hypertension         Subjective   Radha Tobias is a 50 y.o. female here for a Well Woman Visit. Energy level is described as fair and she is sleeping well. She sleeps 7 hours nightly. Last pap was hyst. Contraception: hysterectomy due to fibroids. Patient exercises irregularly. Nutrition is described as in general, a \"healthy\" diet  . Her   libido is abnormal. She reports that she does not perform monthly self breast exam.      Hypertension  This is a chronic problem. The current episode started more than 1 year ago. The problem has been waxing and waning since onset. The problem is controlled. Pertinent negatives include no chest pain or shortness of breath. There are no associated agents to hypertension. There are no known risk factors for coronary artery disease. Past treatments include nothing. Current antihypertension treatment includes ACE inhibitors and diuretics. The current treatment provides moderate improvement. There are no compliance problems.         I personally reviewed and updated the patient's allergies, medications, problem list, and past medical, surgical, social, and family history.     Allergies:  Allergies   Allergen Reactions   • Sulfa Antibiotics Palpitations       Social History:  Social History     Socioeconomic History   • Marital status: Single     Spouse name: Not on file   • Number of children: Not on file   • Years of education: Not on file   • Highest education level: Not on file   Tobacco Use   • Smoking status: Former Smoker     Packs/day: 0.50     Years: 18.00     Pack years: 9.00     Types: Cigarettes     Quit date: 2009     Years since quittin.4   • Smokeless tobacco: Never Used   Substance and Sexual Activity   • Alcohol use: Yes     Comment: weekly   • Drug use: No   • Sexual activity: Yes       Family History:  History reviewed. No pertinent family history.    Past Medical History :  Active Ambulatory " Problems     Diagnosis Date Noted   • Essential hypertension 11/18/2013   • Easy bruising 10/18/2017   • Mild episode of recurrent major depressive disorder (CMS/[facility]) 11/14/2019   • Anxiety 11/14/2019   • H/O total vaginal hysterectomy 11/14/2019   • GERD (gastroesophageal reflux disease) 07/06/2020   • Pituitary adenoma (CMS/[facility]) 03/23/2021   • Medial epicondylitis of left elbow 03/23/2021   • Low libido 04/20/2021     Resolved Ambulatory Problems     Diagnosis Date Noted   • Other chest pain 07/06/2020   • Acute non-recurrent maxillary sinusitis 11/23/2020   • Urticaria 12/07/2020   • Non-recurrent acute serous otitis media of both ears 12/07/2020     Past Medical History:   Diagnosis Date   • Depression    • Hypertension        Medication List:    Current Outpatient Medications:   •  buPROPion XL (WELLBUTRIN XL) 150 MG 24 hr tablet, Take 1 tablet by mouth Daily., Disp: 30 tablet, Rfl: 2  •  fluticasone (FLONASE) 50 MCG/ACT nasal spray, 1 spray into the nostril(s) as directed by provider Daily., Disp: 16 g, Rfl: 12  •  lisinopril-hydrochlorothiazide (PRINZIDE,ZESTORETIC) 20-12.5 MG per tablet, TAKE 1 TABLET BY MOUTH DAILY, Disp: 30 tablet, Rfl: 12  •  naproxen (NAPROSYN) 500 MG tablet, Take 1 tablet by mouth 2 (Two) Times a Day As Needed for Mild Pain ., Disp: 60 tablet, Rfl: 2  •  omeprazole (priLOSEC) 40 MG capsule, Take 1 capsule by mouth Daily., Disp: 30 capsule, Rfl: 12    Past Surgical History:  Past Surgical History:   Procedure Laterality Date   • APPENDECTOMY     • HYSTERECTOMY     • TUBAL ABDOMINAL LIGATION         Depression Screen:   PHQ-2/PHQ-9 Depression Screening 12/7/2020   Little interest or pleasure in doing things 0   Feeling down, depressed, or hopeless 1   Total Score 1       Fall Risk Screen:  KARENADI Fall Risk Assessment has not been completed.    Review Of Systems:  Review of Systems   Constitutional: Negative for activity change and fever.   HENT: Negative for ear pain, rhinorrhea, sinus  "pressure and voice change.    Eyes: Negative for visual disturbance.   Respiratory: Negative for cough and shortness of breath.    Cardiovascular: Negative for chest pain.   Gastrointestinal: Negative for abdominal pain, diarrhea, nausea and vomiting.   Endocrine: Negative for cold intolerance and heat intolerance.   Genitourinary: Negative for frequency and urgency.   Musculoskeletal: Negative for arthralgias.   Skin: Negative for rash.   Neurological: Negative for syncope.   Hematological: Does not bruise/bleed easily.   Psychiatric/Behavioral: Negative for depressed mood. The patient is not nervous/anxious.        Physical Exam:  Vital Signs:  Visit Vitals  /76   Pulse 93   Temp 98.9 °F (37.2 °C)   Resp 18   Ht 167.6 cm (66\")   Wt 88.8 kg (195 lb 12.8 oz)   SpO2 98%   BMI 31.60 kg/m²       Physical Exam  Vitals reviewed.   Constitutional:       General: She is not in acute distress.     Appearance: She is well-developed. She is not diaphoretic.   HENT:      Head: Normocephalic and atraumatic.      Right Ear: External ear normal. No drainage or swelling. No middle ear effusion. There is no impacted cerumen. Tympanic membrane is not erythematous.      Left Ear: External ear normal. No drainage or swelling.  No middle ear effusion. There is no impacted cerumen. Tympanic membrane is not erythematous.      Nose: Nose normal.      Mouth/Throat:      Pharynx: No oropharyngeal exudate.   Eyes:      General: No scleral icterus.     Conjunctiva/sclera: Conjunctivae normal.      Pupils: Pupils are equal, round, and reactive to light.   Neck:      Thyroid: No thyromegaly.      Trachea: No tracheal deviation.   Cardiovascular:      Rate and Rhythm: Normal rate and regular rhythm.      Heart sounds: Normal heart sounds. No murmur heard.   No friction rub.   Pulmonary:      Effort: Pulmonary effort is normal. No respiratory distress.      Breath sounds: Normal breath sounds. No stridor. No wheezing or rales.   Chest:      " Breasts:         Right: No inverted nipple, mass, nipple discharge, skin change or tenderness.         Left: No inverted nipple, mass, nipple discharge, skin change or tenderness.   Abdominal:      General: Bowel sounds are normal. There is no distension.      Palpations: Abdomen is soft. There is no mass.      Tenderness: There is no abdominal tenderness.      Hernia: No hernia is present.   Genitourinary:     Vagina: No vaginal discharge, erythema or tenderness.      Uterus: Absent.       Rectum: Normal. Guaiac result negative.   Musculoskeletal:         General: No tenderness or deformity. Normal range of motion.      Cervical back: Normal range of motion and neck supple.   Lymphadenopathy:      Cervical: No cervical adenopathy.   Skin:     General: Skin is warm and dry.      Findings: No rash.   Neurological:      Mental Status: She is alert and oriented to person, place, and time.      Cranial Nerves: No cranial nerve deficit.      Sensory: No sensory deficit.      Motor: No abnormal muscle tone.      Coordination: Coordination normal.      Gait: Gait normal.      Deep Tendon Reflexes: Reflexes are normal and symmetric. Reflexes normal.   Psychiatric:         Behavior: Behavior normal.           Assessment and Plan:  Problem List Items Addressed This Visit        Cardiac and Vasculature    Essential hypertension    Current Assessment & Plan     Hypertension is improving with treatment.  Continue current treatment regimen.  Dietary sodium restriction.  Regular aerobic exercise.  Continue current medications.  Blood pressure will be reassessed in 3 months.            Genitourinary and Reproductive     H/O total vaginal hysterectomy    Overview     For fibroids, one ovary on right she thinks.   2/2017         Low libido    Current Assessment & Plan     She is under a lot of stress. This may be the cause  Discussed stress reduction and going on vacation. Give wellbutrin more time. It can cause low libido in the  beginning            Hematology and Neoplasia    Pituitary adenoma (CMS/HCC) - Primary    Current Assessment & Plan     No focal neurological deficits.   Will repeat MRI. I could not attain a previous MRI from [facility]         Relevant Orders    MRI Pituitary With Contrast      Other Visit Diagnoses     Encounter for routine adult physical exam with abnormal findings        Relevant Orders    CBC & Differential    Comprehensive Metabolic Panel    TSH    POC Urinalysis Dipstick, Multipro (Completed)    Screening for cervical cancer        Screening mammogram, encounter for        Relevant Orders    Mammo Screening Digital Tomosynthesis Bilateral With CAD    Screening for hyperlipidemia        Relevant Orders    Lipid Panel With / Chol / HDL Ratio          An After Visit Summary and PPPS were given to the patient.       Discussed injury prevention, diet and exercise, safe sexual practices, and screening for common diseases. Encouraged use of sunscreen and seatbelts. Discussed timing of  cervical cancer screening. Encouraged monthly self-breast exams, yearly clinical breast exams, and discussed timing of mammograms. Avoidance of tobacco encouraged. Limitation or avoidance of alcohol encouraged. Recommend yearly dental and eye exams. Also discussed monitoring of blood pressure, lipids.     I wore protective equipment throughout this patient encounter to include mask, gloves and eye protection. Hand hygiene was performed before donning protective equipment and after removal when leaving the room.

## 2021-04-26 NOTE — ASSESSMENT & PLAN NOTE
No focal neurological deficits.   Will repeat MRI. I could not attain a previous MRI from [facility]

## 2021-04-26 NOTE — ASSESSMENT & PLAN NOTE
She is under a lot of stress. This may be the cause  Discussed stress reduction and going on vacation. Give wellbutrin more time. It can cause low libido in the beginning

## 2021-05-19 ENCOUNTER — HOSPITAL ENCOUNTER (OUTPATIENT)
Dept: MAMMOGRAPHY | Facility: HOSPITAL | Age: 51
Discharge: HOME | End: 2021-05-19
Admitting: FAMILY MEDICINE

## 2021-05-19 ENCOUNTER — APPOINTMENT (OUTPATIENT)
Dept: MRI IMAGING | Facility: HOSPITAL | Age: 51
End: 2021-05-19

## 2021-05-19 DIAGNOSIS — Z12.31 SCREENING MAMMOGRAM, ENCOUNTER FOR: ICD-10-CM

## 2021-05-19 PROCEDURE — 77067 SCR MAMMO BI INCL CAD: CPT

## 2021-05-19 PROCEDURE — 77063 BREAST TOMOSYNTHESIS BI: CPT

## 2021-05-20 ENCOUNTER — HOSPITAL ENCOUNTER (OUTPATIENT)
Dept: MRI IMAGING | Facility: HOSPITAL | Age: 51
Discharge: HOME | End: 2021-05-20
Admitting: FAMILY MEDICINE

## 2021-05-20 DIAGNOSIS — D35.2 PITUITARY ADENOMA (HCC): ICD-10-CM

## 2021-05-20 LAB — CREAT BLDA-MCNC: 0.8 MG/DL (ref 0.6–1.3)

## 2021-05-20 PROCEDURE — 25010000002 GADOTERIDOL PER 1 ML: Performed by: FAMILY MEDICINE

## 2021-05-20 PROCEDURE — 82565 ASSAY OF CREATININE: CPT

## 2021-05-20 PROCEDURE — 70553 MRI BRAIN STEM W/O & W/DYE: CPT

## 2021-05-20 PROCEDURE — A9579 GAD-BASE MR CONTRAST NOS,1ML: HCPCS | Performed by: FAMILY MEDICINE

## 2021-05-20 RX ADMIN — GADOTERIDOL 20 ML: 279.3 INJECTION, SOLUTION INTRAVENOUS at 17:13

## 2021-05-25 ENCOUNTER — OFFICE VISIT (OUTPATIENT)
Dept: FAMILY MEDICINE CLINIC | Facility: CLINIC | Age: 51
End: 2021-05-25

## 2021-05-25 VITALS
TEMPERATURE: 98.4 F | SYSTOLIC BLOOD PRESSURE: 120 MMHG | WEIGHT: 199.2 LBS | OXYGEN SATURATION: 99 % | HEIGHT: 66 IN | HEART RATE: 95 BPM | DIASTOLIC BLOOD PRESSURE: 76 MMHG | BODY MASS INDEX: 32.02 KG/M2 | RESPIRATION RATE: 18 BRPM

## 2021-05-25 DIAGNOSIS — E78.00 PURE HYPERCHOLESTEROLEMIA: ICD-10-CM

## 2021-05-25 DIAGNOSIS — D35.2 PITUITARY ADENOMA (HCC): Primary | ICD-10-CM

## 2021-05-25 PROBLEM — E78.5 DYSLIPIDEMIA: Status: ACTIVE | Noted: 2021-05-25

## 2021-05-25 PROCEDURE — 99214 OFFICE O/P EST MOD 30 MIN: CPT | Performed by: FAMILY MEDICINE

## 2021-05-25 NOTE — PROGRESS NOTES
Subjective   Radha Tobias is a 51 y.o. female.     Chief Complaint   Patient presents with   • Results       Patient is here today to go over results of her MRI.  Hyperlipidemia  This is a chronic problem. The current episode started more than 1 year ago. The problem is uncontrolled. Recent lipid tests were reviewed and are high. Exacerbating diseases include obesity. She has no history of chronic renal disease or hypothyroidism. There are no known factors aggravating her hyperlipidemia. Pertinent negatives include no chest pain or shortness of breath. She is currently on no antihyperlipidemic treatment.        I personally reviewed and updated the patient's allergies, medications, problem list, and past medical, surgical, social, and family history. I have reviewed and confirmed the accuracy of the History of Present Illness and Review of Symptoms as documented by the MA/LPN/RN. Julianne Glez MD    Allergies:  Allergies   Allergen Reactions   • Sulfa Antibiotics Palpitations       Social History:  Social History     Socioeconomic History   • Marital status:      Spouse name: Not on file   • Number of children: Not on file   • Years of education: Not on file   • Highest education level: Not on file   Tobacco Use   • Smoking status: Former Smoker     Packs/day: 0.50     Years: 18.00     Pack years: 9.00     Types: Cigarettes     Quit date: 2009     Years since quittin.5   • Smokeless tobacco: Never Used   Substance and Sexual Activity   • Alcohol use: Yes     Comment: weekly   • Drug use: No   • Sexual activity: Yes       Family History:  No family history on file.    Past Medical History :  Patient Active Problem List   Diagnosis   • Essential hypertension   • Easy bruising   • Mild episode of recurrent major depressive disorder (CMS/HCC)   • Anxiety   • H/O total vaginal hysterectomy   • GERD (gastroesophageal reflux disease)   • Pituitary adenoma (CMS/HCC)   • Medial epicondylitis of left  "elbow   • Low libido   • Pure hypercholesterolemia       Medication List:    Current Outpatient Medications:   •  buPROPion XL (WELLBUTRIN XL) 150 MG 24 hr tablet, Take 1 tablet by mouth Daily., Disp: 30 tablet, Rfl: 2  •  fluticasone (FLONASE) 50 MCG/ACT nasal spray, 1 spray into the nostril(s) as directed by provider Daily., Disp: 16 g, Rfl: 12  •  lisinopril-hydrochlorothiazide (PRINZIDE,ZESTORETIC) 20-12.5 MG per tablet, TAKE 1 TABLET BY MOUTH DAILY, Disp: 30 tablet, Rfl: 12  •  naproxen (NAPROSYN) 500 MG tablet, Take 1 tablet by mouth 2 (Two) Times a Day As Needed for Mild Pain ., Disp: 60 tablet, Rfl: 2  •  omeprazole (priLOSEC) 40 MG capsule, Take 1 capsule by mouth Daily., Disp: 30 capsule, Rfl: 12    Past Surgical History:  Past Surgical History:   Procedure Laterality Date   • APPENDECTOMY     • HYSTERECTOMY     • TUBAL ABDOMINAL LIGATION         Review of Systems:  Review of Systems   Constitutional: Negative for activity change and fever.   HENT: Negative for ear pain, rhinorrhea, sinus pressure and voice change.    Eyes: Negative for visual disturbance.   Respiratory: Negative for cough and shortness of breath.    Cardiovascular: Negative for chest pain.   Gastrointestinal: Negative for abdominal pain, diarrhea, nausea and vomiting.   Endocrine: Negative for cold intolerance and heat intolerance.   Genitourinary: Negative for frequency and urgency.   Musculoskeletal: Negative for arthralgias.   Skin: Negative for rash.   Neurological: Negative for syncope.   Hematological: Does not bruise/bleed easily.   Psychiatric/Behavioral: Negative for depressed mood. The patient is not nervous/anxious.        Physical Exam:  Vital Signs:  Vital Signs:   /76   Pulse 95   Temp 98.4 °F (36.9 °C)   Resp 18   Ht 167.6 cm (66\")   Wt 90.4 kg (199 lb 3.2 oz)   SpO2 99%   BMI 32.15 kg/m²     Result Review :   The following data was reviewed by: Julianne Glez MD on 05/25/2021:  CMP    CMP 5/20/21 5/26/21 "   Glucose  93   BUN  11   Creatinine 0.80 0.84   eGFR Non  Am  81   eGFR  Am  94   Sodium  141   Potassium  4.9   Chloride  101   Calcium  9.2   Total Protein  7.2   Albumin  4.6   Globulin  2.6   Total Bilirubin  <0.2   Alkaline Phosphatase  96   AST (SGOT)  16   ALT (SGPT)  18      Comments are available for some flowsheets but are not being displayed.           CBC    CBC 5/26/21   WBC 7.5   RBC 3.97   Hemoglobin 12.6   Hematocrit 37.7   MCV 95   MCH 31.7   MCHC 33.4   RDW 13.1   Platelets 293           Lipid Panel    Lipid Panel 5/26/21   Total Cholesterol 222 (A)   Triglycerides 107   HDL Cholesterol 82   VLDL Cholesterol 19   LDL Cholesterol  121 (A)   (A) Abnormal value            TSH    TSH 5/26/21   TSH 1.070           Data reviewed: Radiologic studies MRI         Physical Exam  Vitals reviewed.   Constitutional:       Appearance: Normal appearance. She is well-developed.   HENT:      Head: Normocephalic and atraumatic.   Eyes:      General:         Right eye: No discharge.         Left eye: No discharge.      Extraocular Movements: Extraocular movements intact.      Pupils: Pupils are equal, round, and reactive to light.   Cardiovascular:      Rate and Rhythm: Normal rate and regular rhythm.      Heart sounds: Normal heart sounds. No murmur heard.   No friction rub. No gallop.    Pulmonary:      Effort: Pulmonary effort is normal. No respiratory distress.      Breath sounds: Normal breath sounds. No wheezing or rales.   Skin:     General: Skin is warm and dry.      Findings: No rash.   Neurological:      General: No focal deficit present.      Mental Status: She is alert and oriented to person, place, and time.      Coordination: Coordination normal.      Gait: Gait normal.      Deep Tendon Reflexes: Reflexes normal.   Psychiatric:         Behavior: Behavior is cooperative.         Assessment and Plan:  Problems Addressed this Visit        Cardiac and Vasculature    Pure hypercholesterolemia      Lipid abnormalities are worsening.  Nutritional counseling was provided.  Lipids will be reassessed in 3 months.            Hematology and Neoplasia    Pituitary adenoma (CMS/HCC) - Primary     Noted on MRI in the past. . Previous MRI not available. Current MRI says adenoma vs rathke cyst.              Diagnoses       Codes Comments    Pituitary adenoma (CMS/HCC)    -  Primary ICD-10-CM: D35.2  ICD-9-CM: 227.3     Pure hypercholesterolemia     ICD-10-CM: E78.00  ICD-9-CM: 272.0            An After Visit Summary and PPPS were given to the patient.         I wore protective equipment throughout this patient encounter to include mask. Hand hygiene was performed before donning protective equipment and after removal when leaving the room.

## 2021-05-27 ENCOUNTER — TELEPHONE (OUTPATIENT)
Dept: FAMILY MEDICINE CLINIC | Facility: CLINIC | Age: 51
End: 2021-05-27

## 2021-05-27 PROBLEM — E78.00 PURE HYPERCHOLESTEROLEMIA: Status: ACTIVE | Noted: 2021-05-25

## 2021-05-27 LAB
ALBUMIN SERPL-MCNC: 4.6 G/DL (ref 3.8–4.8)
ALBUMIN/GLOB SERPL: 1.8 {RATIO} (ref 1.2–2.2)
ALP SERPL-CCNC: 96 IU/L (ref 48–121)
ALT SERPL-CCNC: 18 IU/L (ref 0–32)
AST SERPL-CCNC: 16 IU/L (ref 0–40)
BASOPHILS # BLD AUTO: 0 X10E3/UL (ref 0–0.2)
BASOPHILS NFR BLD AUTO: 0 %
BILIRUB SERPL-MCNC: <0.2 MG/DL (ref 0–1.2)
BUN SERPL-MCNC: 11 MG/DL (ref 6–24)
BUN/CREAT SERPL: 13 (ref 9–23)
CALCIUM SERPL-MCNC: 9.2 MG/DL (ref 8.7–10.2)
CHLORIDE SERPL-SCNC: 101 MMOL/L (ref 96–106)
CHOLEST SERPL-MCNC: 222 MG/DL (ref 100–199)
CHOLEST/HDLC SERPL: 2.7 RATIO (ref 0–4.4)
CO2 SERPL-SCNC: 26 MMOL/L (ref 20–29)
CREAT SERPL-MCNC: 0.84 MG/DL (ref 0.57–1)
EOSINOPHIL # BLD AUTO: 0.2 X10E3/UL (ref 0–0.4)
EOSINOPHIL NFR BLD AUTO: 2 %
ERYTHROCYTE [DISTWIDTH] IN BLOOD BY AUTOMATED COUNT: 13.1 % (ref 11.7–15.4)
GLOBULIN SER CALC-MCNC: 2.6 G/DL (ref 1.5–4.5)
GLUCOSE SERPL-MCNC: 93 MG/DL (ref 65–99)
HCT VFR BLD AUTO: 37.7 % (ref 34–46.6)
HDLC SERPL-MCNC: 82 MG/DL
HGB BLD-MCNC: 12.6 G/DL (ref 11.1–15.9)
IMM GRANULOCYTES # BLD AUTO: 0 X10E3/UL (ref 0–0.1)
IMM GRANULOCYTES NFR BLD AUTO: 0 %
LDLC SERPL CALC-MCNC: 121 MG/DL (ref 0–99)
LYMPHOCYTES # BLD AUTO: 2.6 X10E3/UL (ref 0.7–3.1)
LYMPHOCYTES NFR BLD AUTO: 34 %
MCH RBC QN AUTO: 31.7 PG (ref 26.6–33)
MCHC RBC AUTO-ENTMCNC: 33.4 G/DL (ref 31.5–35.7)
MCV RBC AUTO: 95 FL (ref 79–97)
MONOCYTES # BLD AUTO: 0.7 X10E3/UL (ref 0.1–0.9)
MONOCYTES NFR BLD AUTO: 9 %
NEUTROPHILS # BLD AUTO: 4.1 X10E3/UL (ref 1.4–7)
NEUTROPHILS NFR BLD AUTO: 55 %
PLATELET # BLD AUTO: 293 X10E3/UL (ref 150–450)
POTASSIUM SERPL-SCNC: 4.9 MMOL/L (ref 3.5–5.2)
PROT SERPL-MCNC: 7.2 G/DL (ref 6–8.5)
RBC # BLD AUTO: 3.97 X10E6/UL (ref 3.77–5.28)
SODIUM SERPL-SCNC: 141 MMOL/L (ref 134–144)
TRIGL SERPL-MCNC: 107 MG/DL (ref 0–149)
TSH SERPL DL<=0.005 MIU/L-ACNC: 1.07 UIU/ML (ref 0.45–4.5)
VLDLC SERPL CALC-MCNC: 19 MG/DL (ref 5–40)
WBC # BLD AUTO: 7.5 X10E3/UL (ref 3.4–10.8)

## 2021-05-27 NOTE — ASSESSMENT & PLAN NOTE
Noted on MRI in the past. . Previous MRI not available. Current MRI says adenoma vs rathke cyst.

## 2021-06-01 ENCOUNTER — TELEPHONE (OUTPATIENT)
Dept: FAMILY MEDICINE CLINIC | Facility: CLINIC | Age: 51
End: 2021-06-01

## 2021-07-16 ENCOUNTER — OFFICE VISIT (OUTPATIENT)
Dept: FAMILY MEDICINE CLINIC | Facility: CLINIC | Age: 51
End: 2021-07-16

## 2021-07-16 VITALS
WEIGHT: 195.2 LBS | HEART RATE: 108 BPM | DIASTOLIC BLOOD PRESSURE: 78 MMHG | HEIGHT: 66 IN | BODY MASS INDEX: 31.37 KG/M2 | OXYGEN SATURATION: 97 % | SYSTOLIC BLOOD PRESSURE: 118 MMHG | RESPIRATION RATE: 18 BRPM | TEMPERATURE: 98.4 F

## 2021-07-16 DIAGNOSIS — R30.0 DYSURIA: Primary | ICD-10-CM

## 2021-07-16 LAB
BILIRUB BLD-MCNC: NEGATIVE MG/DL
CLARITY, POC: CLEAR
COLOR UR: YELLOW
GLUCOSE UR STRIP-MCNC: NEGATIVE MG/DL
KETONES UR QL: NEGATIVE
LEUKOCYTE EST, POC: NEGATIVE
NITRITE UR-MCNC: NEGATIVE MG/ML
PH UR: 5 [PH] (ref 5–8)
PROT UR STRIP-MCNC: NEGATIVE MG/DL
RBC # UR STRIP: NEGATIVE /UL
SP GR UR: 1.02 (ref 1–1.03)
UROBILINOGEN UR QL: NORMAL

## 2021-07-16 PROCEDURE — 81003 URINALYSIS AUTO W/O SCOPE: CPT | Performed by: FAMILY MEDICINE

## 2021-07-16 PROCEDURE — 99213 OFFICE O/P EST LOW 20 MIN: CPT | Performed by: FAMILY MEDICINE

## 2021-07-16 RX ORDER — NITROFURANTOIN 25; 75 MG/1; MG/1
100 CAPSULE ORAL 2 TIMES DAILY
Qty: 14 CAPSULE | Refills: 0 | Status: SHIPPED | OUTPATIENT
Start: 2021-07-16 | End: 2021-12-17

## 2021-07-16 NOTE — PROGRESS NOTES
Subjective   Radha Tobias is a 51 y.o. female.     Chief Complaint   Patient presents with   • Urinary Tract Infection       Urinary Tract Infection   This is a new problem. The current episode started in the past 7 days. The problem occurs intermittently. The problem has been waxing and waning. The quality of the pain is described as burning and aching. The pain is mild. There has been no fever. She is sexually active. There is a history of pyelonephritis. Pertinent negatives include no flank pain, frequency, nausea, urgency or vomiting. She has tried increased fluids for the symptoms. The treatment provided no relief.        I personally reviewed and updated the patient's allergies, medications, problem list, and past medical, surgical, social, and family history. I have reviewed and confirmed the accuracy of the History of Present Illness and Review of Symptoms as documented by the MA/LPN/RN. Julianne Glez MD    Allergies:  Allergies   Allergen Reactions   • Sulfa Antibiotics Palpitations       Social History:  Social History     Socioeconomic History   • Marital status:      Spouse name: Not on file   • Number of children: Not on file   • Years of education: Not on file   • Highest education level: Not on file   Tobacco Use   • Smoking status: Former Smoker     Packs/day: 0.50     Years: 18.00     Pack years: 9.00     Types: Cigarettes     Quit date: 2009     Years since quittin.6   • Smokeless tobacco: Never Used   Substance and Sexual Activity   • Alcohol use: Yes     Comment: weekly   • Drug use: No   • Sexual activity: Yes       Family History:  History reviewed. No pertinent family history.    Past Medical History :  Patient Active Problem List   Diagnosis   • Essential hypertension   • Easy bruising   • Mild episode of recurrent major depressive disorder (CMS/HCC)   • Anxiety   • H/O total vaginal hysterectomy   • GERD (gastroesophageal reflux disease)   • Pituitary adenoma (CMS/HCC)    • Medial epicondylitis of left elbow   • Low libido   • Pure hypercholesterolemia   • Dysuria       Medication List:    Current Outpatient Medications:   •  fluticasone (FLONASE) 50 MCG/ACT nasal spray, 1 spray into the nostril(s) as directed by provider Daily., Disp: 16 g, Rfl: 12  •  lisinopril-hydrochlorothiazide (PRINZIDE,ZESTORETIC) 20-12.5 MG per tablet, TAKE 1 TABLET BY MOUTH DAILY, Disp: 30 tablet, Rfl: 12  •  naproxen (NAPROSYN) 500 MG tablet, Take 1 tablet by mouth 2 (Two) Times a Day As Needed for Mild Pain ., Disp: 60 tablet, Rfl: 2  •  omeprazole (priLOSEC) 40 MG capsule, Take 1 capsule by mouth Daily., Disp: 30 capsule, Rfl: 12  •  omeprazole (priLOSEC) 40 MG capsule, Take 1 capsule by mouth Daily., Disp: 30 capsule, Rfl: 12  •  buPROPion XL (WELLBUTRIN XL) 150 MG 24 hr tablet, Take 1 tablet by mouth Daily., Disp: 30 tablet, Rfl: 12  •  nitrofurantoin, macrocrystal-monohydrate, (MACROBID) 100 MG capsule, Take 1 capsule by mouth 2 (Two) Times a Day., Disp: 14 capsule, Rfl: 0    Past Surgical History:  Past Surgical History:   Procedure Laterality Date   • APPENDECTOMY     • HYSTERECTOMY     • TUBAL ABDOMINAL LIGATION         Review of Systems:  Review of Systems   Constitutional: Negative for activity change and fever.   HENT: Negative for ear pain, rhinorrhea, sinus pressure and voice change.    Eyes: Negative for visual disturbance.   Respiratory: Negative for cough and shortness of breath.    Cardiovascular: Negative for chest pain.   Gastrointestinal: Negative for abdominal pain, diarrhea, nausea and vomiting.   Endocrine: Negative for cold intolerance and heat intolerance.   Genitourinary: Negative for flank pain, frequency and urgency.   Musculoskeletal: Negative for arthralgias.   Skin: Negative for rash.   Neurological: Negative for syncope.   Hematological: Does not bruise/bleed easily.   Psychiatric/Behavioral: Negative for depressed mood. The patient is not nervous/anxious.        Physical  "Exam:  Vital Signs:  Vital Signs:   /78 (BP Location: Left arm, Patient Position: Sitting, Cuff Size: Adult)   Pulse 108   Temp 98.4 °F (36.9 °C)   Resp 18   Ht 167.6 cm (66\")   Wt 88.5 kg (195 lb 3.2 oz)   SpO2 97%   BMI 31.51 kg/m²     Result Review :   The following data was reviewed by: Julianne lGez MD on 07/16/2021:               Physical Exam  Vitals reviewed.   Constitutional:       Appearance: Normal appearance. She is well-developed.   HENT:      Head: Normocephalic and atraumatic.   Eyes:      General:         Right eye: No discharge.         Left eye: No discharge.   Cardiovascular:      Rate and Rhythm: Normal rate and regular rhythm.      Heart sounds: Normal heart sounds. No murmur heard.   No friction rub. No gallop.    Pulmonary:      Effort: Pulmonary effort is normal. No respiratory distress.      Breath sounds: Normal breath sounds. No wheezing or rales.   Abdominal:      General: Abdomen is flat. Bowel sounds are normal. There is no distension.      Palpations: Abdomen is soft. There is no mass.      Tenderness: There is no abdominal tenderness. There is no right CVA tenderness, left CVA tenderness, guarding or rebound.      Hernia: No hernia is present.   Skin:     General: Skin is warm and dry.      Findings: No rash.   Neurological:      Mental Status: She is alert and oriented to person, place, and time.      Coordination: Coordination normal.      Gait: Gait normal.   Psychiatric:         Behavior: Behavior is cooperative.         Assessment and Plan:  Problems Addressed this Visit        Genitourinary and Reproductive     Dysuria - Primary     Diagnosis, treatment and and course discussed. Potential side effects discussed. Return if there is worsening or persistence of symptoms.            Relevant Medications    nitrofurantoin, macrocrystal-monohydrate, (MACROBID) 100 MG capsule    Other Relevant Orders    POC Urinalysis Dipstick, Automated (Completed)    Urine Culture - " Urine, Urine, Random Void (Completed)      Diagnoses       Codes Comments    Dysuria    -  Primary ICD-10-CM: R30.0  ICD-9-CM: 788.1            An After Visit Summary and PPPS were given to the patient.         I wore protective equipment throughout this patient encounter to include mask. Hand hygiene was performed before donning protective equipment and after removal when leaving the room.

## 2021-07-19 LAB
BACTERIA UR CULT: ABNORMAL
BACTERIA UR CULT: ABNORMAL
OTHER ANTIBIOTIC SUSC ISLT: ABNORMAL

## 2021-07-22 RX ORDER — BUPROPION HYDROCHLORIDE 150 MG/1
TABLET ORAL
Qty: 30 TABLET | Refills: 1 | OUTPATIENT
Start: 2021-07-22

## 2021-07-22 RX ORDER — BUPROPION HYDROCHLORIDE 150 MG/1
TABLET ORAL
Qty: 30 TABLET | Refills: 12 | Status: SHIPPED | OUTPATIENT
Start: 2021-07-22 | End: 2021-12-17 | Stop reason: DRUGHIGH

## 2021-08-13 RX ORDER — OMEPRAZOLE 40 MG/1
40 CAPSULE, DELAYED RELEASE ORAL DAILY
Qty: 30 CAPSULE | Refills: 12 | Status: CANCELLED | OUTPATIENT
Start: 2021-08-13

## 2021-08-13 RX ORDER — OMEPRAZOLE 40 MG/1
40 CAPSULE, DELAYED RELEASE ORAL DAILY
Qty: 30 CAPSULE | Refills: 12 | Status: SHIPPED | OUTPATIENT
Start: 2021-08-13 | End: 2022-05-06

## 2021-12-17 ENCOUNTER — OFFICE VISIT (OUTPATIENT)
Dept: FAMILY MEDICINE CLINIC | Facility: CLINIC | Age: 51
End: 2021-12-17

## 2021-12-17 VITALS
RESPIRATION RATE: 16 BRPM | SYSTOLIC BLOOD PRESSURE: 120 MMHG | DIASTOLIC BLOOD PRESSURE: 70 MMHG | OXYGEN SATURATION: 98 % | WEIGHT: 178 LBS | HEIGHT: 66 IN | BODY MASS INDEX: 28.61 KG/M2 | HEART RATE: 72 BPM | TEMPERATURE: 97.5 F

## 2021-12-17 DIAGNOSIS — R10.11 RUQ PAIN: Primary | ICD-10-CM

## 2021-12-17 DIAGNOSIS — M89.8X1 PAIN OF RIGHT SCAPULA: ICD-10-CM

## 2021-12-17 DIAGNOSIS — F33.1 MODERATE EPISODE OF RECURRENT MAJOR DEPRESSIVE DISORDER (HCC): ICD-10-CM

## 2021-12-17 PROBLEM — M77.02 MEDIAL EPICONDYLITIS OF LEFT ELBOW: Status: RESOLVED | Noted: 2021-03-23 | Resolved: 2021-12-17

## 2021-12-17 PROBLEM — R07.81 RIB PAIN ON RIGHT SIDE: Status: ACTIVE | Noted: 2021-12-17

## 2021-12-17 LAB
BILIRUB BLD-MCNC: NEGATIVE MG/DL
CLARITY, POC: CLEAR
COLOR UR: YELLOW
GLUCOSE UR STRIP-MCNC: NEGATIVE MG/DL
KETONES UR QL: NEGATIVE
LEUKOCYTE EST, POC: NEGATIVE
NITRITE UR-MCNC: NEGATIVE MG/ML
PH UR: 5 [PH] (ref 5–8)
PROT UR STRIP-MCNC: ABNORMAL MG/DL
RBC # UR STRIP: NEGATIVE /UL
SP GR UR: 1.01 (ref 1–1.03)
UROBILINOGEN UR QL: NORMAL

## 2021-12-17 PROCEDURE — 99213 OFFICE O/P EST LOW 20 MIN: CPT | Performed by: FAMILY MEDICINE

## 2021-12-17 PROCEDURE — 81002 URINALYSIS NONAUTO W/O SCOPE: CPT | Performed by: FAMILY MEDICINE

## 2021-12-17 RX ORDER — MELOXICAM 7.5 MG/1
7.5 TABLET ORAL DAILY
Qty: 30 TABLET | Refills: 1 | Status: SHIPPED | OUTPATIENT
Start: 2021-12-17 | End: 2022-10-24

## 2021-12-17 RX ORDER — TIZANIDINE 4 MG/1
4 TABLET ORAL NIGHTLY PRN
Qty: 30 TABLET | Refills: 0 | Status: SHIPPED | OUTPATIENT
Start: 2021-12-17

## 2021-12-17 RX ORDER — BUPROPION HYDROCHLORIDE 75 MG/1
75 TABLET ORAL 2 TIMES DAILY
Qty: 60 TABLET | Refills: 12 | Status: SHIPPED | OUTPATIENT
Start: 2021-12-17 | End: 2023-02-02 | Stop reason: SDUPTHER

## 2021-12-17 NOTE — PROGRESS NOTES
Subjective   Radha Tobias is a 51 y.o. female. Presents to [facility]    Chief Complaint   Patient presents with   • Abdominal Pain     RUQ       Abdominal Pain  This is a recurrent problem. Episode onset: x 2 weeks. The onset quality is gradual. The problem occurs intermittently. Duration: 30 minutes. The problem has been waxing and waning. The pain is located in the RUQ and right flank. The pain is mild. Quality: stabbing, pinching. The abdominal pain radiates to the right flank (right scapula). Associated symptoms include belching, constipation, diarrhea, flatus and nausea. Pertinent negatives include no arthralgias, fever, frequency or vomiting. The pain is aggravated by eating. The pain is relieved by nothing. She has tried nothing for the symptoms. Her past medical history is significant for GERD.    worse after eating. She feels it in her back and right shoulder. It comes and goes.     I personally reviewed and updated the patient's allergies, medications, problem list, and past medical, surgical, social, and family history. I have reviewed and confirmed the accuracy of the History of Present Illness and Review of Symptoms as documented by the MA/LPN/RN. Julianne Glez MD    Allergies:  Allergies   Allergen Reactions   • Sulfa Antibiotics Palpitations       Social History:  Social History     Socioeconomic History   • Marital status:    Tobacco Use   • Smoking status: Former Smoker     Packs/day: 0.50     Years: 18.00     Pack years: 9.00     Types: Cigarettes     Quit date: 2009     Years since quittin.0   • Smokeless tobacco: Never Used   Substance and Sexual Activity   • Alcohol use: Yes     Comment: weekly   • Drug use: No   • Sexual activity: Yes       Family History:  History reviewed. No pertinent family history.    Past Medical History :  Patient Active Problem List   Diagnosis   • Essential hypertension   • Easy bruising   • Mild episode of recurrent major  depressive disorder (HCC)   • Anxiety   • H/O total vaginal hysterectomy   • GERD (gastroesophageal reflux disease)   • Pituitary adenoma (HCC)   • Low libido   • Pure hypercholesterolemia   • Dysuria   • Pain of right scapula   • Rib pain on right side       Medication List:    Current Outpatient Medications:   •  lisinopril-hydrochlorothiazide (PRINZIDE,ZESTORETIC) 20-12.5 MG per tablet, TAKE 1 TABLET BY MOUTH DAILY, Disp: 30 tablet, Rfl: 12  •  omeprazole (priLOSEC) 40 MG capsule, Take 1 capsule by mouth Daily., Disp: 30 capsule, Rfl: 12  •  buPROPion (WELLBUTRIN) 75 MG tablet, Take 1 tablet by mouth 2 (Two) Times a Day., Disp: 60 tablet, Rfl: 12  •  meloxicam (MOBIC) 7.5 MG tablet, Take 1 tablet by mouth Daily., Disp: 30 tablet, Rfl: 1  •  tiZANidine (ZANAFLEX) 4 MG tablet, Take 1 tablet by mouth At Night As Needed for Muscle Spasms., Disp: 30 tablet, Rfl: 0    Past Surgical History:  Past Surgical History:   Procedure Laterality Date   • APPENDECTOMY     • HYSTERECTOMY     • TUBAL ABDOMINAL LIGATION         Review of Systems:  Review of Systems   Constitutional: Negative for activity change and fever.   HENT: Negative for ear pain, rhinorrhea, sinus pressure and voice change.    Eyes: Negative for visual disturbance.   Respiratory: Negative for cough and shortness of breath.    Cardiovascular: Negative for chest pain.   Gastrointestinal: Positive for abdominal pain, constipation, diarrhea, flatus and nausea. Negative for vomiting.   Endocrine: Negative for cold intolerance and heat intolerance.   Genitourinary: Negative for frequency and urgency.   Musculoskeletal: Negative for arthralgias.   Skin: Negative for rash.   Neurological: Negative for syncope.   Hematological: Does not bruise/bleed easily.   Psychiatric/Behavioral: Negative for depressed mood. The patient is not nervous/anxious.        Physical Exam:  Vital Signs:  Vital Signs:   /70 (BP Location: Right arm, Patient Position: Sitting, Cuff  "Size: Large Adult)   Pulse 72   Temp 97.5 °F (36.4 °C) (Skin)   Resp 16   Ht 167.6 cm (66\")   Wt 80.7 kg (178 lb)   SpO2 98%   BMI 28.73 kg/m²     Result Review :   The following data was reviewed by: Julianne Glez MD on 12/17/2021:           Brief Urine Lab Results  (Last result in the past 365 days)      Color   Clarity   Blood   Leuk Est   Nitrite   Protein   CREAT   Urine HCG        12/17/21 1425 Yellow   Clear   Negative   Negative   Negative   Trace                   Physical Exam  Vitals reviewed.   Constitutional:       Appearance: Normal appearance. She is well-developed.   HENT:      Head: Normocephalic and atraumatic.   Eyes:      General:         Right eye: No discharge.         Left eye: No discharge.   Cardiovascular:      Rate and Rhythm: Normal rate and regular rhythm.      Heart sounds: Normal heart sounds. No murmur heard.  No friction rub. No gallop.    Pulmonary:      Effort: Pulmonary effort is normal. No respiratory distress.      Breath sounds: Normal breath sounds. No wheezing or rales.   Chest:       Abdominal:      General: Abdomen is flat. Bowel sounds are normal. There is no distension.      Palpations: Abdomen is soft. There is no mass.      Tenderness: There is abdominal tenderness (RUQ). There is no guarding or rebound.      Hernia: No hernia is present.   Musculoskeletal:      Thoracic back: Spasms and tenderness (right trapezius) present.        Back:    Skin:     General: Skin is warm and dry.      Findings: No rash.   Neurological:      Mental Status: She is alert and oriented to person, place, and time.      Coordination: Coordination normal.      Gait: Gait normal.   Psychiatric:         Behavior: Behavior is cooperative.         Assessment and Plan:  Problems Addressed this Visit        Musculoskeletal and Injuries    Pain of right scapula     Ice three times a day for about 10-15 minutes for the first 1-2 days. Then may alternate heat and ice. Better body mechanics " discussed. Home exercises discussed and hand out given. Discussed nsaids and if they can be taken. May need imaging and or PT if persists.           Relevant Medications    tiZANidine (ZANAFLEX) 4 MG tablet    meloxicam (MOBIC) 7.5 MG tablet      Other Visit Diagnoses     RUQ pain    -  Primary    WIll get labs and also RUQ u/s. May be muscle spasm    Relevant Orders    POCT urinalysis dipstick, manual (Completed)    CBC & Differential (Completed)    Comprehensive Metabolic Panel (Completed)    Amylase (Completed)    Lipase (Completed)    US Gallbladder    Moderate episode of recurrent major depressive disorder (HCC)        Relevant Medications    buPROPion (WELLBUTRIN) 75 MG tablet      Diagnoses       Codes Comments    RUQ pain    -  Primary ICD-10-CM: R10.11  ICD-9-CM: 789.01 WIll get labs and also RUQ u/s. May be muscle spasm    Pain of right scapula     ICD-10-CM: M89.8X1  ICD-9-CM: 733.90     Moderate episode of recurrent major depressive disorder (HCC)     ICD-10-CM: F33.1  ICD-9-CM: 296.32            An After Visit Summary and PPPS were given to the patient.       I wore protective equipment throughout this patient encounter to include mask. Hand hygiene was performed before donning protective equipment and after removal when leaving the room.

## 2021-12-18 LAB
ALBUMIN SERPL-MCNC: 4.5 G/DL (ref 3.8–4.8)
ALBUMIN/GLOB SERPL: 1.7 {RATIO} (ref 1.2–2.2)
ALP SERPL-CCNC: 92 IU/L (ref 44–121)
ALT SERPL-CCNC: 14 IU/L (ref 0–32)
AMYLASE SERPL-CCNC: 53 U/L (ref 31–110)
AST SERPL-CCNC: 11 IU/L (ref 0–40)
BASOPHILS # BLD AUTO: 0 X10E3/UL (ref 0–0.2)
BASOPHILS NFR BLD AUTO: 0 %
BILIRUB SERPL-MCNC: 0.2 MG/DL (ref 0–1.2)
BUN SERPL-MCNC: 11 MG/DL (ref 6–24)
BUN/CREAT SERPL: 12 (ref 9–23)
CALCIUM SERPL-MCNC: 9.5 MG/DL (ref 8.7–10.2)
CHLORIDE SERPL-SCNC: 100 MMOL/L (ref 96–106)
CO2 SERPL-SCNC: 26 MMOL/L (ref 20–29)
CREAT SERPL-MCNC: 0.91 MG/DL (ref 0.57–1)
EOSINOPHIL # BLD AUTO: 0.1 X10E3/UL (ref 0–0.4)
EOSINOPHIL NFR BLD AUTO: 1 %
ERYTHROCYTE [DISTWIDTH] IN BLOOD BY AUTOMATED COUNT: 12.6 % (ref 11.7–15.4)
GLOBULIN SER CALC-MCNC: 2.6 G/DL (ref 1.5–4.5)
GLUCOSE SERPL-MCNC: 84 MG/DL (ref 65–99)
HCT VFR BLD AUTO: 37.1 % (ref 34–46.6)
HGB BLD-MCNC: 12.7 G/DL (ref 11.1–15.9)
IMM GRANULOCYTES # BLD AUTO: 0 X10E3/UL (ref 0–0.1)
IMM GRANULOCYTES NFR BLD AUTO: 0 %
LIPASE SERPL-CCNC: 20 U/L (ref 14–72)
LYMPHOCYTES # BLD AUTO: 2.4 X10E3/UL (ref 0.7–3.1)
LYMPHOCYTES NFR BLD AUTO: 32 %
MCH RBC QN AUTO: 31.4 PG (ref 26.6–33)
MCHC RBC AUTO-ENTMCNC: 34.2 G/DL (ref 31.5–35.7)
MCV RBC AUTO: 92 FL (ref 79–97)
MONOCYTES # BLD AUTO: 0.6 X10E3/UL (ref 0.1–0.9)
MONOCYTES NFR BLD AUTO: 9 %
NEUTROPHILS # BLD AUTO: 4.2 X10E3/UL (ref 1.4–7)
NEUTROPHILS NFR BLD AUTO: 58 %
PLATELET # BLD AUTO: 298 X10E3/UL (ref 150–450)
POTASSIUM SERPL-SCNC: 4.5 MMOL/L (ref 3.5–5.2)
PROT SERPL-MCNC: 7.1 G/DL (ref 6–8.5)
RBC # BLD AUTO: 4.05 X10E6/UL (ref 3.77–5.28)
SODIUM SERPL-SCNC: 140 MMOL/L (ref 134–144)
WBC # BLD AUTO: 7.3 X10E3/UL (ref 3.4–10.8)

## 2021-12-20 ENCOUNTER — TELEPHONE (OUTPATIENT)
Dept: FAMILY MEDICINE CLINIC | Facility: CLINIC | Age: 51
End: 2021-12-20

## 2021-12-27 NOTE — ASSESSMENT & PLAN NOTE
Ice three times a day for about 10-15 minutes for the first 1-2 days. Then may alternate heat and ice. Better body mechanics discussed. Home exercises discussed and hand out given. Discussed nsaids and if they can be taken. May need imaging and or PT if persists.

## 2021-12-28 ENCOUNTER — APPOINTMENT (OUTPATIENT)
Dept: ULTRASOUND IMAGING | Facility: HOSPITAL | Age: 51
End: 2021-12-28

## 2022-04-19 DIAGNOSIS — I10 ESSENTIAL HYPERTENSION: ICD-10-CM

## 2022-04-19 RX ORDER — LISINOPRIL AND HYDROCHLOROTHIAZIDE 20; 12.5 MG/1; MG/1
1 TABLET ORAL DAILY
Qty: 30 TABLET | Refills: 12 | Status: CANCELLED | OUTPATIENT
Start: 2022-04-19

## 2022-04-21 DIAGNOSIS — I10 ESSENTIAL HYPERTENSION: ICD-10-CM

## 2022-04-21 RX ORDER — LISINOPRIL AND HYDROCHLOROTHIAZIDE 20; 12.5 MG/1; MG/1
1 TABLET ORAL DAILY
Qty: 30 TABLET | Refills: 12 | Status: SHIPPED | OUTPATIENT
Start: 2022-04-21

## 2022-04-26 ENCOUNTER — TELEPHONE (OUTPATIENT)
Dept: FAMILY MEDICINE CLINIC | Facility: CLINIC | Age: 52
End: 2022-04-26

## 2022-04-26 NOTE — TELEPHONE ENCOUNTER
----- Message from Radha Tobias sent at 4/25/2022  7:15 PM EDT -----  Regarding: Itching  Dr. Glez,   I have been experiencing itching, rashes, and welps on various locations all over my body. I have been to the dermatologist where I received some topical steroids and that did not help. I saw an allergist today who is referring me to a allergy specialist. I am so miserable with all of this itching. I don't know how long it will take to see a specialist. Is there anyway I could possibly be seen to see what your opinion is on this? Or do you think I should wait for the specialist? Thank you!

## 2022-04-27 ENCOUNTER — HOSPITAL ENCOUNTER (EMERGENCY)
Facility: HOSPITAL | Age: 52
Discharge: HOME | End: 2022-04-27
Attending: EMERGENCY MEDICINE | Admitting: EMERGENCY MEDICINE

## 2022-04-27 VITALS
OXYGEN SATURATION: 98 % | TEMPERATURE: 98 F | DIASTOLIC BLOOD PRESSURE: 60 MMHG | BODY MASS INDEX: 24.09 KG/M2 | HEART RATE: 88 BPM | WEIGHT: 149.91 LBS | SYSTOLIC BLOOD PRESSURE: 114 MMHG | RESPIRATION RATE: 17 BRPM | HEIGHT: 66 IN

## 2022-04-27 DIAGNOSIS — R21 RASH AND NONSPECIFIC SKIN ERUPTION: Primary | ICD-10-CM

## 2022-04-27 PROCEDURE — 25010000002 METHYLPREDNISOLONE PER 125 MG: Performed by: PHYSICIAN ASSISTANT

## 2022-04-27 PROCEDURE — 25010000002 DIPHENHYDRAMINE PER 50 MG: Performed by: PHYSICIAN ASSISTANT

## 2022-04-27 PROCEDURE — 99283 EMERGENCY DEPT VISIT LOW MDM: CPT

## 2022-04-27 PROCEDURE — 96375 TX/PRO/DX INJ NEW DRUG ADDON: CPT

## 2022-04-27 PROCEDURE — 96374 THER/PROPH/DIAG INJ IV PUSH: CPT

## 2022-04-27 RX ORDER — FAMOTIDINE 20 MG/1
40 TABLET, FILM COATED ORAL ONCE
Status: COMPLETED | OUTPATIENT
Start: 2022-04-27 | End: 2022-04-27

## 2022-04-27 RX ORDER — METHYLPREDNISOLONE SODIUM SUCCINATE 125 MG/2ML
125 INJECTION, POWDER, LYOPHILIZED, FOR SOLUTION INTRAMUSCULAR; INTRAVENOUS ONCE
Status: COMPLETED | OUTPATIENT
Start: 2022-04-27 | End: 2022-04-27

## 2022-04-27 RX ORDER — SODIUM CHLORIDE 0.9 % (FLUSH) 0.9 %
10 SYRINGE (ML) INJECTION AS NEEDED
Status: DISCONTINUED | OUTPATIENT
Start: 2022-04-27 | End: 2022-04-27 | Stop reason: HOSPADM

## 2022-04-27 RX ORDER — DIPHENHYDRAMINE HYDROCHLORIDE 50 MG/ML
25 INJECTION INTRAMUSCULAR; INTRAVENOUS ONCE
Status: COMPLETED | OUTPATIENT
Start: 2022-04-27 | End: 2022-04-27

## 2022-04-27 RX ADMIN — METHYLPREDNISOLONE SODIUM SUCCINATE 125 MG: 125 INJECTION, POWDER, FOR SOLUTION INTRAMUSCULAR; INTRAVENOUS at 02:41

## 2022-04-27 RX ADMIN — DIPHENHYDRAMINE HYDROCHLORIDE 25 MG: 50 INJECTION, SOLUTION INTRAMUSCULAR; INTRAVENOUS at 02:41

## 2022-04-27 RX ADMIN — FAMOTIDINE 40 MG: 20 TABLET ORAL at 02:41

## 2022-05-05 NOTE — PROGRESS NOTES
Subjective   Radha Tobias is a 51 y.o. female. Presents to [facility]    Chief Complaint   Patient presents with   • Heartburn   • Rash       Heartburn  She complains of abdominal pain (goes into her upper back ), belching, heartburn and a sore throat. She reports no chest pain, no choking, no coughing, no dysphagia, no early satiety, no globus sensation, no hoarse voice, no nausea, no stridor, no tooth decay, no water brash or no wheezing. This is a recurrent problem. The current episode started 1 to 4 weeks ago (she states it has been going on for a couple weeks now ). The problem occurs frequently. The problem has been gradually worsening. The heartburn is located in the RUQ (Under breast bone on the right side ). The heartburn is of severe intensity. The heartburn wakes her from sleep. The heartburn limits her activity. The heartburn changes with position. Nothing aggravates the symptoms. Pertinent negatives include no anemia, fatigue, melena, muscle weakness, orthopnea or weight loss. Patient states that her heart burn has been really bad lately and she states that she feels it is getting worse and worse for her .   . She has tried an antacid for the symptoms. The treatment provided no relief. Past procedures do not include an abdominal ultrasound, an EGD, esophageal manometry, esophageal pH monitoring, H. pylori antibody titer or a UGI. Past invasive treatments do not include gastroplasty, gastroplication or reflux surgery.   Rash  This is a recurrent problem. The current episode started more than 1 month ago. The problem has been gradually worsening since onset. The affected locations include the back, chest, scalp, left arm, right arm, torso and abdomen. The rash is characterized by dryness, itchiness, redness, pain, burning and blistering. She was exposed to nothing. Associated symptoms include a sore throat. Pertinent negatives include no anorexia, congestion, cough, diarrhea, eye pain,  facial edema, fatigue, fever, joint pain, nail changes, rhinorrhea, shortness of breath or vomiting. (She states that she had had it for greater than a month now and she states that it was getting worse and she states that she went to the Er and they gave her steroids and she states that it is slightly improving but she states that she still has it in some places. She states that she has seen derm and was give steroid cream it did not work for her .) Past treatments include anti-itch cream and oral steroids. The treatment provided mild relief. There is no history of allergies, asthma, eczema or varicella.      The rash started in March. She went to the ER. She got steroids, benadryl and pepcid.     I personally reviewed and updated the patient's allergies, medications, problem list, and past medical, surgical, social, and family history. I have reviewed and confirmed the accuracy of the History of Present Illness and Review of Symptoms as documented by the MA/LPN/RN. Julianne Glez MD    Allergies:  Allergies   Allergen Reactions   • Sulfa Antibiotics Palpitations       Social History:  Social History     Socioeconomic History   • Marital status:    Tobacco Use   • Smoking status: Former Smoker     Packs/day: 0.50     Years: 18.00     Pack years: 9.00     Types: Cigarettes     Quit date: 2009     Years since quittin.4   • Smokeless tobacco: Never Used   Substance and Sexual Activity   • Alcohol use: Yes     Comment: weekly   • Drug use: No   • Sexual activity: Yes       Family History:  No family history on file.    Past Medical History :  Patient Active Problem List   Diagnosis   • Essential hypertension   • Easy bruising   • Mild episode of recurrent major depressive disorder (HCC)   • Anxiety   • H/O total vaginal hysterectomy   • GERD (gastroesophageal reflux disease)   • Pituitary adenoma (HCC)   • Low libido   • Pure hypercholesterolemia   • Dysuria   • Pain of right scapula   • Rib pain on  "right side   • Pityriasis rosea       Medication List:    Current Outpatient Medications:   •  buPROPion (WELLBUTRIN) 75 MG tablet, Take 1 tablet by mouth 2 (Two) Times a Day., Disp: 60 tablet, Rfl: 12  •  lisinopril-hydrochlorothiazide (PRINZIDE,ZESTORETIC) 20-12.5 MG per tablet, TAKE 1 TABLET BY MOUTH DAILY, Disp: 30 tablet, Rfl: 12  •  meloxicam (MOBIC) 7.5 MG tablet, Take 1 tablet by mouth Daily., Disp: 30 tablet, Rfl: 1  •  tiZANidine (ZANAFLEX) 4 MG tablet, Take 1 tablet by mouth At Night As Needed for Muscle Spasms., Disp: 30 tablet, Rfl: 0  •  Hydrocortisone 2.5 % solution, Apply 1 each topically 2 (Two) Times a Day., Disp: 30 mL, Rfl: 0  •  pantoprazole (PROTONIX) 40 MG EC tablet, Take 1 tablet by mouth Daily., Disp: 90 tablet, Rfl: 3    Past Surgical History:  Past Surgical History:   Procedure Laterality Date   • APPENDECTOMY     • HYSTERECTOMY     • TUBAL ABDOMINAL LIGATION         Review of Systems:  Review of Systems   Constitutional: Negative for fatigue, fever and unexpected weight loss.   HENT: Positive for sore throat. Negative for congestion, hoarse voice and rhinorrhea.    Eyes: Negative for pain.   Respiratory: Negative for cough, choking, shortness of breath and wheezing.    Cardiovascular: Negative for chest pain.   Gastrointestinal: Positive for abdominal pain (goes into her upper back ). Negative for anorexia, diarrhea, dysphagia, melena, nausea and vomiting.   Musculoskeletal: Negative for joint pain and muscle weakness.   Skin: Positive for rash. Negative for nail changes.       Physical Exam:  Vital Signs:  Vital Signs:   /84   Pulse 96   Temp 97.3 °F (36.3 °C)   Resp 18   Ht 167.6 cm (66\")   Wt 80.3 kg (177 lb)   SpO2 99%   BMI 28.57 kg/m²     Result Review :                Physical Exam  Vitals reviewed.   Constitutional:       Appearance: Normal appearance. She is well-developed.   HENT:      Head: Normocephalic and atraumatic.   Eyes:      General:         Right eye: No " discharge.         Left eye: No discharge.   Cardiovascular:      Rate and Rhythm: Normal rate and regular rhythm.      Heart sounds: Normal heart sounds. No murmur heard.    No friction rub. No gallop.   Pulmonary:      Effort: Pulmonary effort is normal. No respiratory distress.      Breath sounds: Normal breath sounds. No wheezing or rales.   Skin:     General: Skin is warm and dry.      Findings: No rash.      Comments: Ovoid macules on back, stomach with Faded areas on arms   Neurological:      Mental Status: She is alert and oriented to person, place, and time.      Coordination: Coordination normal.      Gait: Gait normal.   Psychiatric:         Behavior: Behavior is cooperative.         Assessment and Plan:  Problems Addressed this Visit        Gastrointestinal Abdominal     GERD (gastroesophageal reflux disease) - Primary     Change to protonix    Limit tobacco, alcohol, caffeine, chocalate, citrus fruits, recumbency after meals and large portions. Discussed link between PPI's and increased risk of hip, wrist, and spine fractures    Give green packet for GSI           Relevant Medications    pantoprazole (PROTONIX) 40 MG EC tablet       Skin    Pityriasis rosea     On tail end of issue most likely. Antihistamine as needed             Diagnoses       Codes Comments    Gastroesophageal reflux disease, unspecified whether esophagitis present    -  Primary ICD-10-CM: K21.9  ICD-9-CM: 530.81     Pityriasis rosea     ICD-10-CM: L42  ICD-9-CM: 696.3            BMI is within normal parameters. No follow-up required.      An After Visit Summary and PPPS were given to the patient.       I wore protective equipment throughout this patient encounter to include mask. Hand hygiene was performed before donning protective equipment and after removal when leaving the room.

## 2022-05-06 ENCOUNTER — OFFICE VISIT (OUTPATIENT)
Dept: FAMILY MEDICINE CLINIC | Facility: CLINIC | Age: 52
End: 2022-05-06

## 2022-05-06 VITALS
WEIGHT: 177 LBS | DIASTOLIC BLOOD PRESSURE: 84 MMHG | OXYGEN SATURATION: 99 % | HEIGHT: 66 IN | HEART RATE: 96 BPM | SYSTOLIC BLOOD PRESSURE: 124 MMHG | BODY MASS INDEX: 28.45 KG/M2 | TEMPERATURE: 97.3 F | RESPIRATION RATE: 18 BRPM

## 2022-05-06 DIAGNOSIS — L42 PITYRIASIS ROSEA: ICD-10-CM

## 2022-05-06 DIAGNOSIS — K21.9 GASTROESOPHAGEAL REFLUX DISEASE, UNSPECIFIED WHETHER ESOPHAGITIS PRESENT: Primary | ICD-10-CM

## 2022-05-06 PROCEDURE — 99214 OFFICE O/P EST MOD 30 MIN: CPT | Performed by: FAMILY MEDICINE

## 2022-05-06 RX ORDER — PANTOPRAZOLE SODIUM 40 MG/1
40 TABLET, DELAYED RELEASE ORAL DAILY
Qty: 90 TABLET | Refills: 3 | Status: SHIPPED | OUTPATIENT
Start: 2022-05-06 | End: 2022-10-24

## 2022-05-06 NOTE — ASSESSMENT & PLAN NOTE
Change to protonix    Limit tobacco, alcohol, caffeine, chocalate, citrus fruits, recumbency after meals and large portions. Discussed link between PPI's and increased risk of hip, wrist, and spine fractures    Give green packet for GSI

## 2022-05-10 ENCOUNTER — TELEPHONE (OUTPATIENT)
Dept: FAMILY MEDICINE CLINIC | Facility: CLINIC | Age: 52
End: 2022-05-10

## 2022-05-10 DIAGNOSIS — L29.9 SCALP ITCH: Primary | ICD-10-CM

## 2022-05-12 ENCOUNTER — TELEPHONE (OUTPATIENT)
Dept: FAMILY MEDICINE CLINIC | Facility: CLINIC | Age: 52
End: 2022-05-12

## 2022-05-12 ENCOUNTER — LAB (OUTPATIENT)
Dept: LAB | Facility: HOSPITAL | Age: 52
End: 2022-05-12

## 2022-05-12 DIAGNOSIS — R21 RASH: Primary | ICD-10-CM

## 2022-05-12 LAB
CHROMATIN AB SERPL-ACNC: <10 IU/ML (ref 0–14)
CRP SERPL-MCNC: 0.63 MG/DL (ref 0–0.5)

## 2022-05-12 PROCEDURE — 86140 C-REACTIVE PROTEIN: CPT | Performed by: FAMILY MEDICINE

## 2022-05-12 PROCEDURE — 86038 ANTINUCLEAR ANTIBODIES: CPT | Performed by: FAMILY MEDICINE

## 2022-05-12 PROCEDURE — 36415 COLL VENOUS BLD VENIPUNCTURE: CPT | Performed by: FAMILY MEDICINE

## 2022-05-12 PROCEDURE — 86431 RHEUMATOID FACTOR QUANT: CPT | Performed by: FAMILY MEDICINE

## 2022-05-13 ENCOUNTER — TELEPHONE (OUTPATIENT)
Dept: FAMILY MEDICINE CLINIC | Facility: CLINIC | Age: 52
End: 2022-05-13

## 2022-05-14 LAB — ANA SER QL IF: NEGATIVE

## 2022-05-19 ENCOUNTER — TELEPHONE (OUTPATIENT)
Dept: FAMILY MEDICINE CLINIC | Facility: CLINIC | Age: 52
End: 2022-05-19

## 2022-05-19 NOTE — TELEPHONE ENCOUNTER
Caller: BRUNO    Relationship to patient: Other    Best call back number: [phone]    Patient is needing: BRUNO WITH [facility] IS CALLING TO STATE THE FOLLOWING MEDICATION DOES NOT REQUIRE A PRIOR AUTHORIZATION.    [employer]ACORT    PLEASE ADVISE.

## 2022-05-20 ENCOUNTER — TELEPHONE (OUTPATIENT)
Dept: FAMILY MEDICINE CLINIC | Facility: CLINIC | Age: 52
End: 2022-05-20

## 2022-05-20 NOTE — TELEPHONE ENCOUNTER
Caller: VLADIMIR    Relationship: Other    Best call back number: [phone]    VLADIMIR, WITH [facility] CALLED NEEDING PRIOR AUTHORIZATION ON PATIENTS PRESCRIPTION:    TEXACORT 2.5 % SOLUTION       PLEASE CALL AND ADVISE

## 2022-09-02 RX ORDER — HYDROXYZINE HYDROCHLORIDE 10 MG/1
TABLET, FILM COATED ORAL
Qty: 30 TABLET | Refills: 0 | Status: SHIPPED | OUTPATIENT
Start: 2022-09-02

## 2022-10-05 DIAGNOSIS — H65.03 NON-RECURRENT ACUTE SEROUS OTITIS MEDIA OF BOTH EARS: ICD-10-CM

## 2022-10-05 RX ORDER — AZITHROMYCIN 250 MG/1
TABLET, FILM COATED ORAL
Qty: 6 TABLET | Refills: 0 | Status: SHIPPED | OUTPATIENT
Start: 2022-10-05 | End: 2022-10-24

## 2022-10-19 ENCOUNTER — TELEPHONE (OUTPATIENT)
Dept: FAMILY MEDICINE CLINIC | Facility: CLINIC | Age: 52
End: 2022-10-19

## 2022-10-19 DIAGNOSIS — Z11.59 NEED FOR HEPATITIS C SCREENING TEST: ICD-10-CM

## 2022-10-19 DIAGNOSIS — E78.00 PURE HYPERCHOLESTEROLEMIA: ICD-10-CM

## 2022-10-19 DIAGNOSIS — I10 ESSENTIAL HYPERTENSION: Primary | ICD-10-CM

## 2022-10-19 NOTE — TELEPHONE ENCOUNTER
----- Message from Laure Marley MA sent at 10/10/2022 12:24 PM EDT -----  Regarding: 10/17/2022 order 4 panel  Order physical blood work to be done and message pt to let her know.

## 2022-10-20 ENCOUNTER — LAB (OUTPATIENT)
Dept: LAB | Facility: HOSPITAL | Age: 52
End: 2022-10-20

## 2022-10-20 LAB
ALBUMIN SERPL-MCNC: 4.6 G/DL (ref 3.5–5.2)
ALBUMIN/GLOB SERPL: 1.5 G/DL
ALP SERPL-CCNC: 102 U/L (ref 39–117)
ALT SERPL W P-5'-P-CCNC: 15 U/L (ref 1–33)
ANION GAP SERPL CALCULATED.3IONS-SCNC: 12 MMOL/L (ref 5–15)
AST SERPL-CCNC: 17 U/L (ref 1–32)
BASOPHILS # BLD AUTO: 0.03 10*3/MM3 (ref 0–0.2)
BASOPHILS NFR BLD AUTO: 0.5 % (ref 0–1.5)
BILIRUB SERPL-MCNC: 0.3 MG/DL (ref 0–1.2)
BUN SERPL-MCNC: 12 MG/DL (ref 6–20)
BUN/CREAT SERPL: 12.8 (ref 7–25)
CALCIUM SPEC-SCNC: 9.7 MG/DL (ref 8.6–10.5)
CHLORIDE SERPL-SCNC: 100 MMOL/L (ref 98–107)
CHOLEST SERPL-MCNC: 245 MG/DL (ref 0–200)
CO2 SERPL-SCNC: 27 MMOL/L (ref 22–29)
CREAT SERPL-MCNC: 0.94 MG/DL (ref 0.57–1)
DEPRECATED RDW RBC AUTO: 42.5 FL (ref 37–54)
EGFRCR SERPLBLD CKD-EPI 2021: 73.6 ML/MIN/1.73
EOSINOPHIL # BLD AUTO: 0.16 10*3/MM3 (ref 0–0.4)
EOSINOPHIL NFR BLD AUTO: 2.5 % (ref 0.3–6.2)
ERYTHROCYTE [DISTWIDTH] IN BLOOD BY AUTOMATED COUNT: 12.8 % (ref 12.3–15.4)
GLOBULIN UR ELPH-MCNC: 3 GM/DL
GLUCOSE SERPL-MCNC: 128 MG/DL (ref 65–99)
HCT VFR BLD AUTO: 37.9 % (ref 34–46.6)
HDLC SERPL QL: 2.88
HDLC SERPL-MCNC: 85 MG/DL (ref 40–60)
HGB BLD-MCNC: 13 G/DL (ref 12–15.9)
IMM GRANULOCYTES # BLD AUTO: 0.01 10*3/MM3 (ref 0–0.05)
IMM GRANULOCYTES NFR BLD AUTO: 0.2 % (ref 0–0.5)
LDLC SERPL CALC-MCNC: 141 MG/DL (ref 0–100)
LYMPHOCYTES # BLD AUTO: 2.36 10*3/MM3 (ref 0.7–3.1)
LYMPHOCYTES NFR BLD AUTO: 36.1 % (ref 19.6–45.3)
MCH RBC QN AUTO: 31.2 PG (ref 26.6–33)
MCHC RBC AUTO-ENTMCNC: 34.3 G/DL (ref 31.5–35.7)
MCV RBC AUTO: 90.9 FL (ref 79–97)
MONOCYTES # BLD AUTO: 0.53 10*3/MM3 (ref 0.1–0.9)
MONOCYTES NFR BLD AUTO: 8.1 % (ref 5–12)
NEUTROPHILS NFR BLD AUTO: 3.44 10*3/MM3 (ref 1.7–7)
NEUTROPHILS NFR BLD AUTO: 52.6 % (ref 42.7–76)
NRBC BLD AUTO-RTO: 0 /100 WBC (ref 0–0.2)
PLATELET # BLD AUTO: 271 10*3/MM3 (ref 140–450)
PMV BLD AUTO: 9.6 FL (ref 6–12)
POTASSIUM SERPL-SCNC: 4.4 MMOL/L (ref 3.5–5.2)
PROT SERPL-MCNC: 7.6 G/DL (ref 6–8.5)
RBC # BLD AUTO: 4.17 10*6/MM3 (ref 3.77–5.28)
SODIUM SERPL-SCNC: 139 MMOL/L (ref 136–145)
TRIGL SERPL-MCNC: 112 MG/DL (ref 0–150)
TSH SERPL DL<=0.05 MIU/L-ACNC: 1.08 UIU/ML (ref 0.27–4.2)
VLDLC SERPL-MCNC: 19 MG/DL (ref 5–40)
WBC NRBC COR # BLD: 6.53 10*3/MM3 (ref 3.4–10.8)

## 2022-10-20 PROCEDURE — 80050 GENERAL HEALTH PANEL: CPT | Performed by: FAMILY MEDICINE

## 2022-10-20 PROCEDURE — 87522 HEPATITIS C REVRS TRNSCRPJ: CPT | Performed by: FAMILY MEDICINE

## 2022-10-20 PROCEDURE — 80061 LIPID PANEL: CPT | Performed by: FAMILY MEDICINE

## 2022-10-20 PROCEDURE — 36415 COLL VENOUS BLD VENIPUNCTURE: CPT | Performed by: FAMILY MEDICINE

## 2022-10-21 NOTE — PROGRESS NOTES
"  Chief Complaint   Patient presents with   • Annual Exam         Subjective   Radha Tobias is a 52 y.o. female here for a Annual Visit.     Last Physical Exam: 04/20/2021 Previous physical was performed by  Julianne Glez MD  General Health:good  Contraceptive History:hysterectomy  Nutrition:in general, a \"healthy\" diet    Exercise Level:not at all  Sleep:fairly well  Hours of Sleep:8  Libido:good  Self Skin Exam: occasionally  Monthly Breast Self Exam:Does not perform monthly breast exam    Pap Smear:  Last Completed Pap Smear          Discontinued - PAP SMEAR  Discontinued    11/19/2015  Outside Procedure: CHG CYTOPATH CERV/VAG THIN LAYER             Findings on last pap: approximate date 11/19/2015 and was normal} Hysterectomy    Mammogram:   Last Completed Mammogram          Scheduled - MAMMOGRAM (Every 2 Years) Scheduled for 11/8/2022 05/19/2021  Mammo Screening Digital Tomosynthesis Bilateral With CAD    05/10/2017  Mammo Screening Bilateral With CAD             was done on approximately 05/19/2021 and the result was: Birads I (Normal).     Bone Dexa scan: None    Colonoscopy:   Last Completed Colonoscopy     This patient has no relevant Health Maintenance data.       no prior     Hypertension  This is a chronic problem. The current episode started more than 1 year ago. The problem is controlled. Pertinent negatives include no chest pain, headaches, malaise/fatigue or shortness of breath. Risk factors for coronary artery disease include dyslipidemia. Current antihypertension treatment includes ACE inhibitors. The current treatment provides moderate improvement.   Hyperlipidemia  She has no history of diabetes or obesity. Pertinent negatives include no chest pain or shortness of breath. She is currently on no antihyperlipidemic treatment. Risk factors for coronary artery disease include hypertension, dyslipidemia and post-menopausal.        I personally reviewed and updated the patient's allergies, " medications, problem list, and past medical, surgical, social, and family history.     Allergies:  Allergies   Allergen Reactions   • Sulfa Antibiotics Palpitations       Social History:  Social History     Socioeconomic History   • Marital status:    Tobacco Use   • Smoking status: Former     Packs/day: 0.50     Years: 18.00     Pack years: 9.00     Types: Cigarettes     Start date:      Quit date: 2009     Years since quittin.9   • Smokeless tobacco: Never   Substance and Sexual Activity   • Alcohol use: Yes     Comment: weekly   • Drug use: No   • Sexual activity: Yes       Family History:  No family history on file.    Past Medical History :  Active Ambulatory Problems     Diagnosis Date Noted   • Essential hypertension 2013   • Easy bruising 10/18/2017   • Mild episode of recurrent major depressive disorder (HCC) 2019   • Anxiety 2019   • H/O total vaginal hysterectomy 2019   • GERD (gastroesophageal reflux disease) 2020   • Pituitary adenoma (HCC) 2021   • Low libido 2021   • Pure hypercholesterolemia 2021   • Dysuria 2021   • Pain of right scapula 2021   • Rib pain on right side 2021   • Pityriasis rosea 2022   • Class 1 obesity due to excess calories with body mass index (BMI) of 30.0 to 30.9 in adult 10/24/2022   • Hyperglycemia 10/24/2022     Resolved Ambulatory Problems     Diagnosis Date Noted   • Other chest pain 2020   • Acute non-recurrent maxillary sinusitis 2020   • Urticaria 2020   • Non-recurrent acute serous otitis media of both ears 2020   • Medial epicondylitis of left elbow 2021     Past Medical History:   Diagnosis Date   • Depression    • Hypertension        Medication List:    Current Outpatient Medications:   •  buPROPion (WELLBUTRIN) 75 MG tablet, Take 1 tablet by mouth 2 (Two) Times a Day., Disp: 60 tablet, Rfl: 12  •  Hydrocortisone 2.5 % solution, Apply 1 each  "topically 2 (Two) Times a Day., Disp: 30 mL, Rfl: 0  •  hydrOXYzine (ATARAX) 10 MG tablet, Take 1/2 or 1 tablet once daily at bedtime as needed for itching, Disp: 30 tablet, Rfl: 0  •  lisinopril-hydrochlorothiazide (PRINZIDE,ZESTORETIC) 20-12.5 MG per tablet, TAKE 1 TABLET BY MOUTH DAILY, Disp: 30 tablet, Rfl: 12  •  tiZANidine (ZANAFLEX) 4 MG tablet, Take 1 tablet by mouth At Night As Needed for Muscle Spasms., Disp: 30 tablet, Rfl: 0    Past Surgical History:  Past Surgical History:   Procedure Laterality Date   • APPENDECTOMY     • HYSTERECTOMY     • TUBAL ABDOMINAL LIGATION         Depression Screen:   PHQ-2/PHQ-9 Depression Screening 10/24/2022   Retired Total Score -   Little Interest or Pleasure in Doing Things 0-->not at all   Feeling Down, Depressed or Hopeless 1-->several days   PHQ-9: Brief Depression Severity Measure Score 1       Fall Risk Screen:  PETTY Fall Risk Assessment has not been completed.    Review Of Systems:  Review of Systems   Constitutional: Negative for malaise/fatigue.   Respiratory: Negative for shortness of breath.    Cardiovascular: Negative for chest pain.       Physical Exam:  Vital Signs:  Visit Vitals  /80 (BP Location: Right arm, Patient Position: Sitting, Cuff Size: Adult)   Pulse 104   Temp 97.5 °F (36.4 °C) (Temporal)   Resp 18   Ht 167.6 cm (66\")   Wt 84.2 kg (185 lb 9.6 oz)   LMP  (LMP Unknown)   SpO2 98% Comment: room air   Breastfeeding No   BMI 29.96 kg/m²       Body mass index is 29.96 kg/m².      Result Review :   The following data was reviewed by: Julianne Glez MD on 10/24/2022:  Most Recent A1C    HGBA1C Most Recent 10/24/22   Hemoglobin A1C 5.4               Latest Reference Range & Units 10/20/22 08:44 10/24/22 13:29   Glucose 65 - 99 mg/dL 128 (H)    Sodium 136 - 145 mmol/L 139    Potassium 3.5 - 5.2 mmol/L 4.4    CO2 22.0 - 29.0 mmol/L 27.0    Chloride 98 - 107 mmol/L 100    Anion Gap 5.0 - 15.0 mmol/L 12.0    Creatinine 0.57 - 1.00 mg/dL 0.94    BUN " 6 - 20 mg/dL 12    BUN/Creatinine Ratio 7.0 - 25.0  12.8    Calcium 8.6 - 10.5 mg/dL 9.7    eGFR >60.0 mL/min/1.73 73.6    Alkaline Phosphatase 39 - 117 U/L 102    Total Protein 6.0 - 8.5 g/dL 7.6    ALT (SGPT) 1 - 33 U/L 15    AST (SGOT) 1 - 32 U/L 17    Total Bilirubin 0.0 - 1.2 mg/dL 0.3    Albumin 3.50 - 5.20 g/dL 4.60    Globulin gm/dL 3.0    A/G Ratio g/dL 1.5    TSH Baseline 0.270 - 4.200 uIU/mL 1.080    Total Cholesterol 0 - 200 mg/dL 245 (H)    HDL Cholesterol 40 - 60 mg/dL 85 (H)    LDL Cholesterol  0 - 100 mg/dL 141 (H)    VLDL Cholesterol 5 - 40 mg/dL 19    Triglycerides 0 - 150 mg/dL 112    Chol/HDL Ratio  2.88    WBC 3.40 - 10.80 10*3/mm3 6.53    RBC 3.77 - 5.28 10*6/mm3 4.17    Hemoglobin 12.0 - 15.9 g/dL 13.0    Hematocrit 34.0 - 46.6 % 37.9    RDW 12.3 - 15.4 % 12.8    MCV 79.0 - 97.0 fL 90.9    MCH 26.6 - 33.0 pg 31.2    MCHC 31.5 - 35.7 g/dL 34.3    MPV 6.0 - 12.0 fL 9.6    Platelets 140 - 450 10*3/mm3 271    RDW-SD 37.0 - 54.0 fl 42.5    Neutrophil Rel % 42.7 - 76.0 % 52.6    Lymphocyte Rel % 19.6 - 45.3 % 36.1    Monocyte Rel % 5.0 - 12.0 % 8.1    Eosinophil Rel % 0.3 - 6.2 % 2.5    Basophil Rel % 0.0 - 1.5 % 0.5    Immature Granulocyte Rel % 0.0 - 0.5 % 0.2    Neutrophils Absolute 1.70 - 7.00 10*3/mm3 3.44    Lymphocytes Absolute 0.70 - 3.10 10*3/mm3 2.36    Monocytes Absolute 0.10 - 0.90 10*3/mm3 0.53    Eosinophils Absolute 0.00 - 0.40 10*3/mm3 0.16    Basophils Absolute 0.00 - 0.20 10*3/mm3 0.03    Immature Grans, Absolute 0.00 - 0.05 10*3/mm3 0.01    nRBC 0.0 - 0.2 /100 WBC 0.0    Color, UA Yellow, Straw, Dark Yellow, Mariah   Yellow   Appearance, UA Clear   Clear   Specific Gravity, UA 1.005 - 1.030   1.015   pH, UA 5.0 - 8.0   6.0   Glucose Negative mg/dL  Negative   Ketones, UA Negative   Negative   Blood, UA Negative   Negative   Nitrite, UA Negative   Negative   Leukocytes, UA Negative   Negative   Protein, UA Negative mg/dL  Negative   Bilirubin, UA Negative   Negative   Urobilinogen,  UA Normal, 0.2 E.U./dL   Normal   (H): Data is abnormally high  Physical Exam  Vitals reviewed.   Constitutional:       General: She is not in acute distress.     Appearance: She is well-developed. She is not diaphoretic.   HENT:      Head: Normocephalic and atraumatic.      Right Ear: Tympanic membrane and external ear normal. No drainage or swelling. No middle ear effusion. There is no impacted cerumen. Tympanic membrane is not erythematous.      Left Ear: Tympanic membrane and external ear normal. No drainage or swelling.  No middle ear effusion. There is no impacted cerumen. Tympanic membrane is not erythematous.      Nose: Nose normal.      Mouth/Throat:      Pharynx: No oropharyngeal exudate.   Eyes:      General: No scleral icterus.     Conjunctiva/sclera: Conjunctivae normal.      Pupils: Pupils are equal, round, and reactive to light.   Neck:      Thyroid: No thyromegaly.      Trachea: No tracheal deviation.   Cardiovascular:      Rate and Rhythm: Normal rate and regular rhythm.      Heart sounds: Normal heart sounds. No murmur heard.    No friction rub.   Pulmonary:      Effort: Pulmonary effort is normal. No respiratory distress.      Breath sounds: Normal breath sounds. No stridor. No wheezing or rales.   Chest:   Breasts:     Right: No inverted nipple, mass, nipple discharge, skin change or tenderness.      Left: No inverted nipple, mass, nipple discharge, skin change or tenderness.   Abdominal:      General: Bowel sounds are normal. There is no distension.      Palpations: Abdomen is soft. There is no mass.      Tenderness: There is no abdominal tenderness.      Hernia: No hernia is present.   Genitourinary:     Vagina: No vaginal discharge, erythema or tenderness.      Uterus: Absent.       Rectum: Normal. Guaiac result negative.   Musculoskeletal:         General: No tenderness or deformity. Normal range of motion.      Cervical back: Normal range of motion and neck supple.   Lymphadenopathy:       Cervical: No cervical adenopathy.   Skin:     General: Skin is warm and dry.      Findings: No rash.   Neurological:      Mental Status: She is alert and oriented to person, place, and time.      Cranial Nerves: No cranial nerve deficit.      Sensory: No sensory deficit.      Motor: No abnormal muscle tone.      Coordination: Coordination normal.      Gait: Gait normal.      Deep Tendon Reflexes: Reflexes are normal and symmetric. Reflexes normal.   Psychiatric:         Behavior: Behavior normal.           Assessment and Plan:  Problem List Items Addressed This Visit        Cardiac and Vasculature    Essential hypertension    Current Assessment & Plan     Hypertension is unchanged.  Continue current treatment regimen.  Dietary sodium restriction.  Blood pressure will be reassessed in 3 months.         Pure hypercholesterolemia    Current Assessment & Plan     Lipid abnormalities are worsening.  Nutritional counseling was provided.  Lipids will be reassessed in 3 months.  She drank creamer before labs            Endocrine and Metabolic    Class 1 obesity due to excess calories with body mass index (BMI) of 30.0 to 30.9 in adult    Current Assessment & Plan     Patient's (Body mass index is 29.96 kg/m².) indicates that they are overweight with health conditions that include hypertension and dyslipidemias . Weight is worsening. BMI is is above average; BMI management plan is completed. We discussed portion control and increasing exercise.          Hyperglycemia    Current Assessment & Plan     Not diabetic  Discussed diet         Relevant Orders    POC Glycosylated Hemoglobin (Hb A1C) (Completed)   Other Visit Diagnoses     Encounter for routine adult physical exam with abnormal findings    -  Primary    Relevant Orders    POCT urinalysis dipstick, manual (Completed)    Encounter for screening mammogram for malignant neoplasm of breast        Relevant Orders    Mammo Screening Digital Tomosynthesis Bilateral With CAD           BMI is >= 25 and <30. (Overweight) The following options were offered after discussion;: weight loss educational material (shared in after visit summary), exercise counseling/recommendations and nutrition counseling/recommendations       An After Visit Summary and PPPS were given to the patient.       Discussed injury prevention, diet and exercise, safe sexual practices, and screening for common diseases. Encouraged use of sunscreen and seatbelts. Discussed timing of  cervical cancer screening. Encouraged monthly self-breast exams, yearly clinical breast exams, and discussed timing of mammograms. Avoidance of tobacco encouraged. Limitation or avoidance of alcohol encouraged. Recommend yearly dental and eye exams. Also discussed monitoring of blood pressure, lipids.     I wore protective equipment throughout this patient encounter to include mask. Hand hygiene was performed before donning protective equipment and after removal when leaving the room.    This document is intended for medical expert use only. Reading of this document by patients and/or patient's family without participating medical staff guidance may result in misinterpretation and unintended morbidity. Any interpretation of such data is the responsibility of the patient and/or family member responsible for the patient in concert with their primary or specialist providers, not to be left for sources of online searches such as [employer], [employer] or similar queries. Relying on these approaches to knowledge may result in misinterpretation, misguided goals of care and even death should patients or family members try recommendations outside of the realm of professional medical care.

## 2022-10-24 ENCOUNTER — OFFICE VISIT (OUTPATIENT)
Dept: FAMILY MEDICINE CLINIC | Facility: CLINIC | Age: 52
End: 2022-10-24

## 2022-10-24 VITALS
DIASTOLIC BLOOD PRESSURE: 80 MMHG | RESPIRATION RATE: 18 BRPM | TEMPERATURE: 97.5 F | SYSTOLIC BLOOD PRESSURE: 124 MMHG | WEIGHT: 185.6 LBS | HEART RATE: 104 BPM | OXYGEN SATURATION: 98 % | BODY MASS INDEX: 29.83 KG/M2 | HEIGHT: 66 IN

## 2022-10-24 DIAGNOSIS — R73.9 HYPERGLYCEMIA: ICD-10-CM

## 2022-10-24 DIAGNOSIS — E78.00 PURE HYPERCHOLESTEROLEMIA: ICD-10-CM

## 2022-10-24 DIAGNOSIS — Z12.31 ENCOUNTER FOR SCREENING MAMMOGRAM FOR MALIGNANT NEOPLASM OF BREAST: ICD-10-CM

## 2022-10-24 DIAGNOSIS — Z00.01 ENCOUNTER FOR ROUTINE ADULT PHYSICAL EXAM WITH ABNORMAL FINDINGS: Primary | ICD-10-CM

## 2022-10-24 DIAGNOSIS — E66.09 CLASS 1 OBESITY DUE TO EXCESS CALORIES WITH SERIOUS COMORBIDITY AND BODY MASS INDEX (BMI) OF 30.0 TO 30.9 IN ADULT: ICD-10-CM

## 2022-10-24 DIAGNOSIS — I10 ESSENTIAL HYPERTENSION: ICD-10-CM

## 2022-10-24 PROBLEM — E66.811 CLASS 1 OBESITY DUE TO EXCESS CALORIES WITH BODY MASS INDEX (BMI) OF 30.0 TO 30.9 IN ADULT: Status: ACTIVE | Noted: 2022-10-24

## 2022-10-24 PROBLEM — E66.3 OVERWEIGHT WITH BODY MASS INDEX (BMI) OF 28 TO 28.9 IN ADULT: Status: ACTIVE | Noted: 2022-10-24

## 2022-10-24 LAB
BILIRUB BLD-MCNC: NEGATIVE MG/DL
CLARITY, POC: CLEAR
COLOR UR: YELLOW
EXPIRATION DATE: NORMAL
GLUCOSE UR STRIP-MCNC: NEGATIVE MG/DL
HBA1C MFR BLD: 5.4 %
HCV RNA SERPL NAA+PROBE-ACNC: NORMAL IU/ML
KETONES UR QL: NEGATIVE
LEUKOCYTE EST, POC: NEGATIVE
Lab: NORMAL
NITRITE UR-MCNC: NEGATIVE MG/ML
PH UR: 6 [PH] (ref 5–8)
PROT UR STRIP-MCNC: NEGATIVE MG/DL
RBC # UR STRIP: NEGATIVE /UL
SP GR UR: 1.01 (ref 1–1.03)
TEST INFORMATION: NORMAL
UROBILINOGEN UR QL: NORMAL

## 2022-10-24 PROCEDURE — 99396 PREV VISIT EST AGE 40-64: CPT | Performed by: FAMILY MEDICINE

## 2022-10-24 PROCEDURE — 83036 HEMOGLOBIN GLYCOSYLATED A1C: CPT | Performed by: FAMILY MEDICINE

## 2022-10-24 PROCEDURE — 81002 URINALYSIS NONAUTO W/O SCOPE: CPT | Performed by: FAMILY MEDICINE

## 2022-10-24 RX ORDER — IVERMECTIN 10 MG/G
CREAM TOPICAL
COMMUNITY
Start: 2022-08-24 | End: 2022-10-24

## 2022-10-24 NOTE — ASSESSMENT & PLAN NOTE
Lipid abnormalities are worsening.  Nutritional counseling was provided.  Lipids will be reassessed in 3 months.  She drank creamer before labs

## 2022-10-24 NOTE — ASSESSMENT & PLAN NOTE
Patient's (Body mass index is 29.96 kg/m².) indicates that they are overweight with health conditions that include hypertension and dyslipidemias . Weight is worsening. BMI is is above average; BMI management plan is completed. We discussed portion control and increasing exercise.

## 2022-11-08 ENCOUNTER — HOSPITAL ENCOUNTER (OUTPATIENT)
Dept: MAMMOGRAPHY | Facility: HOSPITAL | Age: 52
Discharge: HOME | End: 2022-11-08
Admitting: FAMILY MEDICINE

## 2022-11-08 DIAGNOSIS — Z12.31 ENCOUNTER FOR SCREENING MAMMOGRAM FOR MALIGNANT NEOPLASM OF BREAST: ICD-10-CM

## 2022-11-08 PROCEDURE — 77063 BREAST TOMOSYNTHESIS BI: CPT

## 2022-11-08 PROCEDURE — 77067 SCR MAMMO BI INCL CAD: CPT

## 2022-11-11 ENCOUNTER — TELEPHONE (OUTPATIENT)
Dept: FAMILY MEDICINE CLINIC | Facility: CLINIC | Age: 52
End: 2022-11-11

## 2022-11-11 NOTE — TELEPHONE ENCOUNTER
----- Message from Julianne Glez MD sent at 11/11/2022  9:10 AM EST -----  She needs a breast risk assessment

## 2022-12-19 ENCOUNTER — TELEPHONE (OUTPATIENT)
Dept: FAMILY MEDICINE CLINIC | Facility: CLINIC | Age: 52
End: 2022-12-19

## 2022-12-19 RX ORDER — CEPHALEXIN 500 MG/1
500 CAPSULE ORAL 3 TIMES DAILY
Qty: 30 CAPSULE | Refills: 0 | Status: SHIPPED | OUTPATIENT
Start: 2022-12-19

## 2022-12-19 NOTE — TELEPHONE ENCOUNTER
----- Message from Radha Tobias sent at 12/19/2022  7:53 AM EST -----  Regarding: Sinus infection   Contact: [phone]  Can Doctor Glass call me in a prescription for a sinus infection

## 2023-02-02 DIAGNOSIS — F33.1 MODERATE EPISODE OF RECURRENT MAJOR DEPRESSIVE DISORDER: ICD-10-CM

## 2023-02-02 RX ORDER — BUPROPION HYDROCHLORIDE 75 MG/1
75 TABLET ORAL 2 TIMES DAILY
Qty: 60 TABLET | Refills: 12 | Status: SHIPPED | OUTPATIENT
Start: 2023-02-02

## 2023-05-31 DIAGNOSIS — I10 ESSENTIAL HYPERTENSION: ICD-10-CM

## 2023-05-31 RX ORDER — LISINOPRIL AND HYDROCHLOROTHIAZIDE 20; 12.5 MG/1; MG/1
1 TABLET ORAL DAILY
Qty: 30 TABLET | Refills: 12 | Status: CANCELLED | OUTPATIENT
Start: 2023-05-31

## 2023-06-01 DIAGNOSIS — I10 ESSENTIAL HYPERTENSION: ICD-10-CM

## 2023-06-02 RX ORDER — LISINOPRIL AND HYDROCHLOROTHIAZIDE 20; 12.5 MG/1; MG/1
1 TABLET ORAL DAILY
Qty: 30 TABLET | Refills: 12 | Status: SHIPPED | OUTPATIENT
Start: 2023-06-02

## 2023-11-02 NOTE — PROGRESS NOTES
Subjective   Radha Tobias is a 53 y.o. female. Presents to [facility]    Chief Complaint   Patient presents with    Urinary Frequency       Urinary Frequency   This is a new problem. The current episode started more than 1 month ago. The problem has been unchanged. The patient is experiencing no pain. There has been no fever. Associated symptoms include frequency. Pertinent negatives include no discharge, possible pregnancy, sweats or urgency. Associated symptoms comments: Bloated, pressure sensation , discomfort with clothes . She has tried nothing for the symptoms.        I personally reviewed and updated the patient's allergies, medications, problem list, and past medical, surgical, social, and family history. I have reviewed and confirmed the accuracy of the History of Present Illness and Review of Symptoms as documented by the MA/LPN/RN. Julianne Glez MD    Allergies:  Allergies   Allergen Reactions    Sulfa Antibiotics Palpitations       Social History:  Social History     Socioeconomic History    Marital status:    Tobacco Use    Smoking status: Former     Packs/day: 0.50     Years: 18.00     Additional pack years: 0.00     Total pack years: 9.00     Types: Cigarettes     Start date:      Quit date: 2009     Years since quittin.9    Smokeless tobacco: Never   Vaping Use    Vaping Use: Never used   Substance and Sexual Activity    Alcohol use: Yes     Comment: weekly    Drug use: No    Sexual activity: Yes       Family History:  No family history on file.    Past Medical History :  Patient Active Problem List   Diagnosis    Essential hypertension    Easy bruising    Mild episode of recurrent major depressive disorder    Anxiety    H/O total vaginal hysterectomy    GERD (gastroesophageal reflux disease)    Pituitary adenoma    Low libido    Pure hypercholesterolemia    Pain of right scapula    Rib pain on right side    Pityriasis rosea    Class 1 obesity due to  "excess calories with body mass index (BMI) of 30.0 to 30.9 in adult    Hyperglycemia    Vaginal irritation    Colon cancer screening       Medication List:    Current Outpatient Medications:     lisinopril-hydrochlorothiazide (PRINZIDE,ZESTORETIC) 20-12.5 MG per tablet, TAKE 1 TABLET BY MOUTH DAILY, Disp: 30 tablet, Rfl: 12    tiZANidine (ZANAFLEX) 4 MG tablet, Take 1 tablet by mouth At Night As Needed for Muscle Spasms., Disp: 30 tablet, Rfl: 0    terconazole (TERAZOL 7) 0.4 % vaginal cream, Insert 1 applicator into the vagina Every Night., Disp: 1 each, Rfl: 0    Past Surgical History:  Past Surgical History:   Procedure Laterality Date    APPENDECTOMY      HYSTERECTOMY      TUBAL ABDOMINAL LIGATION           Physical Exam:      Vital Signs:    Vitals:    11/06/23 1128   BP: 122/82   Pulse: 104   Resp: 18   Temp: 97.5 °F (36.4 °C)   SpO2: 99%        /82 (BP Location: Right arm, Patient Position: Sitting, Cuff Size: Adult)   Pulse 104   Temp 97.5 °F (36.4 °C) (Temporal)   Resp 18   Ht 167.6 cm (66\")   Wt 87 kg (191 lb 12.8 oz)   LMP  (LMP Unknown)   SpO2 99% Comment: room air  BMI 30.96 kg/m²     Wt Readings from Last 3 Encounters:   11/06/23 87 kg (191 lb 12.8 oz)   10/24/22 84.2 kg (185 lb 9.6 oz)   05/06/22 80.3 kg (177 lb)       Result Review :   The following data was reviewed by: Julianne Glez MD on 11/06/2023:               Physical Exam  Vitals reviewed.   Constitutional:       Appearance: Normal appearance. She is well-developed.   HENT:      Head: Normocephalic and atraumatic.   Eyes:      General:         Right eye: No discharge.         Left eye: No discharge.   Cardiovascular:      Rate and Rhythm: Normal rate and regular rhythm.      Heart sounds: Normal heart sounds. No murmur heard.     No friction rub. No gallop.   Pulmonary:      Effort: Pulmonary effort is normal. No respiratory distress.      Breath sounds: Normal breath sounds. No wheezing or rales.   Genitourinary:     Vagina: " Erythema present.   Skin:     General: Skin is warm and dry.      Findings: No rash.   Neurological:      Mental Status: She is alert and oriented to person, place, and time.      Coordination: Coordination normal.      Gait: Gait normal.   Psychiatric:         Behavior: Behavior is cooperative.         Assessment and Plan:  Problems Addressed this Visit          Endocrine and Metabolic    Class 1 obesity due to excess calories with body mass index (BMI) of 30.0 to 30.9 in adult     Patient's (Body mass index is 30.96 kg/m².) indicates that they are obese (BMI >30) with health conditions that include hypertension and dyslipidemias . Weight is newly identified. BMI  is above average; BMI management plan is completed. We discussed portion control and increasing exercise.             Gastrointestinal Abdominal     Colon cancer screening    Relevant Orders    Cologuard - Stool, Per Rectum       Genitourinary and Reproductive     Vaginal irritation - Primary    Relevant Medications    terconazole (TERAZOL 7) 0.4 % vaginal cream     Diagnoses         Codes Comments    Vaginal irritation    -  Primary ICD-10-CM: N89.8  ICD-9-CM: 623.9     Class 1 obesity due to excess calories with serious comorbidity and body mass index (BMI) of 30.0 to 30.9 in adult     ICD-10-CM: E66.09, Z68.30  ICD-9-CM: 278.00, V85.30     Colon cancer screening     ICD-10-CM: Z12.11  ICD-9-CM: V76.51              BMI is >= 25 and <30. (Overweight) The following options were offered after discussion;: weight loss educational material (shared in after visit summary), exercise counseling/recommendations, and nutrition counseling/recommendations      An After Visit Summary and PPPS were given to the patient.

## 2023-11-06 ENCOUNTER — OFFICE VISIT (OUTPATIENT)
Dept: FAMILY MEDICINE CLINIC | Facility: CLINIC | Age: 53
End: 2023-11-06
Payer: COMMERCIAL

## 2023-11-06 VITALS
HEIGHT: 66 IN | HEART RATE: 104 BPM | BODY MASS INDEX: 30.82 KG/M2 | OXYGEN SATURATION: 99 % | SYSTOLIC BLOOD PRESSURE: 122 MMHG | RESPIRATION RATE: 18 BRPM | TEMPERATURE: 97.5 F | WEIGHT: 191.8 LBS | DIASTOLIC BLOOD PRESSURE: 82 MMHG

## 2023-11-06 DIAGNOSIS — Z12.11 COLON CANCER SCREENING: ICD-10-CM

## 2023-11-06 DIAGNOSIS — E66.09 CLASS 1 OBESITY DUE TO EXCESS CALORIES WITH SERIOUS COMORBIDITY AND BODY MASS INDEX (BMI) OF 30.0 TO 30.9 IN ADULT: ICD-10-CM

## 2023-11-06 DIAGNOSIS — N89.8 VAGINAL IRRITATION: Primary | ICD-10-CM

## 2023-11-06 PROBLEM — R30.0 DYSURIA: Status: RESOLVED | Noted: 2021-07-16 | Resolved: 2023-11-06

## 2023-11-06 RX ORDER — FLUCONAZOLE 150 MG/1
150 TABLET ORAL ONCE
Qty: 1 TABLET | Refills: 0 | Status: SHIPPED | OUTPATIENT
Start: 2023-11-06 | End: 2023-11-06

## 2023-11-06 NOTE — ASSESSMENT & PLAN NOTE
Patient's (Body mass index is 30.96 kg/m².) indicates that they are obese (BMI >30) with health conditions that include hypertension and dyslipidemias . Weight is newly identified. BMI  is above average; BMI management plan is completed. We discussed portion control and increasing exercise.

## 2023-12-08 NOTE — PROGRESS NOTES
"  Chief Complaint   Patient presents with    Annual Exam    Hypertension    Heartburn         Subjective   Radha Tobias is a 53 y.o. female here for a Annual Visit.     Last Physical Exam: 10/24/2022 Previous physical was performed by  Julianne Glez MD  General Health:good  Contraceptive History:Hysterectomy  Nutrition:in general, a \"healthy\" diet    Exercise Level:irregularly  Sleep:fairly well  Hours of Sleep:8  Libido:good  Self Skin Exam: occasionally  Monthly Breast Self Exam:Does not perform monthly breast exam    Pap Smear:  Last Completed Pap Smear            Discontinued - PAP SMEAR  Discontinued      2015  Outside Procedure: CHG CYTOPATH CERV/VAG THIN LAYER                Hyst    Mammogram:   Last Completed Mammogram            Scheduled - MAMMOGRAM (Every 2 Years) Scheduled for 2023  Mammo Screening Digital Tomosynthesis Bilateral With CAD    2021  Mammo Screening Digital Tomosynthesis Bilateral With CAD    05/10/2017  Mammo Screening Bilateral With CAD                 was done on approximately 22 and the result was: Birads I (Normal).    Bone Dexa scan: None    Colonoscopy:       Cologuard not completed          I personally reviewed and updated the patient's allergies, medications, problem list, and past medical, surgical, social, and family history.     Allergies:  Allergies   Allergen Reactions    Sulfa Antibiotics Palpitations       Social History:  Social History     Socioeconomic History    Marital status:    Tobacco Use    Smoking status: Former     Packs/day: 0.50     Years: 18.00     Additional pack years: 0.00     Total pack years: 9.00     Types: Cigarettes     Start date:      Quit date: 2009     Years since quittin.0     Passive exposure: Past    Smokeless tobacco: Never   Vaping Use    Vaping Use: Never used   Substance and Sexual Activity    Alcohol use: Yes     Comment: weekly    Drug use: No    Sexual activity: Yes "       Family History:  History reviewed. No pertinent family history.    Past Medical History :  Active Ambulatory Problems     Diagnosis Date Noted    Essential hypertension 11/18/2013    Easy bruising 10/18/2017    Mild episode of recurrent major depressive disorder 11/14/2019    Anxiety 11/14/2019    H/O total vaginal hysterectomy 11/14/2019    GERD (gastroesophageal reflux disease) 07/06/2020    Pituitary adenoma 03/23/2021    Low libido 04/20/2021    Pure hypercholesterolemia 05/25/2021    Pain of right scapula 12/17/2021    Rib pain on right side 12/17/2021    Pityriasis rosea 05/06/2022    Class 1 obesity due to excess calories with body mass index (BMI) of 30.0 to 30.9 in adult 10/24/2022    Borderline diabetes 10/24/2022    Vaginal irritation 11/06/2023    Colon cancer screening 11/06/2023    Chronic rhinitis 12/11/2023     Resolved Ambulatory Problems     Diagnosis Date Noted    Other chest pain 07/06/2020    Acute non-recurrent maxillary sinusitis 11/23/2020    Urticaria 12/07/2020    Non-recurrent acute serous otitis media of both ears 12/07/2020    Medial epicondylitis of left elbow 03/23/2021    Dysuria 07/16/2021     Past Medical History:   Diagnosis Date    Depression     Hypertension        Medication List:    Current Outpatient Medications:     fluticasone (FLONASE) 50 MCG/ACT nasal spray, 1 spray into the nostril(s) as directed by provider Daily., Disp: 16 g, Rfl: 12    lisinopril-hydrochlorothiazide (PRINZIDE,ZESTORETIC) 20-12.5 MG per tablet, TAKE 1 TABLET BY MOUTH DAILY, Disp: 30 tablet, Rfl: 12    famotidine (PEPCID) 20 MG tablet, Take 1 tablet by mouth 2 (Two) Times a Day., Disp: 60 tablet, Rfl: 5    fexofenadine (ALLEGRA) 180 MG tablet, Take 1 tablet by mouth Daily., Disp: 30 tablet, Rfl: 12    rosuvastatin (CRESTOR) 5 MG tablet, Take 1 tablet by mouth Daily., Disp: 30 tablet, Rfl: 12    Past Surgical History:  Past Surgical History:   Procedure Laterality Date    APPENDECTOMY       "HYSTERECTOMY      for fibroids, not cancer    TUBAL ABDOMINAL LIGATION         Depression Screen:       11/6/2023    11:32 AM   PHQ-2/PHQ-9 Depression Screening   Little Interest or Pleasure in Doing Things 0-->not at all   Feeling Down, Depressed or Hopeless 0-->not at all   PHQ-9: Brief Depression Severity Measure Score 0       Fall Risk Screen:  PETTY Fall Risk Assessment has not been completed.        Physical Exam:  Vital Signs:  Visit Vitals  /80 (BP Location: Right arm, Patient Position: Sitting, Cuff Size: Adult)   Pulse 98   Temp 97.1 °F (36.2 °C) (Temporal)   Resp 20   Ht 167.6 cm (65.98\")   Wt 84.6 kg (186 lb 6.4 oz)   LMP  (LMP Unknown)   SpO2 99%   BMI 30.10 kg/m²       Body mass index is 30.1 kg/m².      Result Review :   The following data was reviewed by: Julianne Glez MD on 12/11/2023:  A1C Last 3 Results          12/11/2023    11:10   HGBA1C Last 3 Results   Hemoglobin A1C 5.6  C      Details         C Corrected result                        Assessment and Plan:  Problem List Items Addressed This Visit          Cardiac and Vasculature    Essential hypertension    Current Assessment & Plan     Hypertension is unchanged.  Continue current treatment regimen.  Dietary sodium restriction.  Weight loss.  Blood pressure will be reassessed in 3 months.         Pure hypercholesterolemia    Relevant Orders    Lipid Panel With / Chol / HDL Ratio (Completed)    Comprehensive Metabolic Panel (Completed)       ENT    Chronic rhinitis       Endocrine and Metabolic    Class 1 obesity due to excess calories with body mass index (BMI) of 30.0 to 30.9 in adult    Borderline diabetes    Current Assessment & Plan     Unchanged         Relevant Orders    POC Glycosylated Hemoglobin (Hb A1C) (Completed)       Gastrointestinal Abdominal     GERD (gastroesophageal reflux disease)    Current Assessment & Plan     Worse  Start pepcid    Diagnosis, treatment and and course discussed. Potential side effects " discussed. Return if there is worsening or persistence of symptoms.            Relevant Medications    famotidine (PEPCID) 20 MG tablet       Genitourinary and Reproductive     H/O total vaginal hysterectomy    Overview     For fibroids, one ovary on right she thinks.   2/2017          Other Visit Diagnoses       Encounter for routine adult physical exam with abnormal findings    -  Primary    Relevant Orders    POCT urinalysis dipstick, multipro (Completed)    CBC & Differential (Completed)    TSH (Completed)    Encounter for screening mammogram for malignant neoplasm of breast        Non-seasonal allergic rhinitis due to other allergic trigger        Relevant Medications    fluticasone (FLONASE) 50 MCG/ACT nasal spray    fexofenadine (ALLEGRA) 180 MG tablet    Screening mammogram for breast cancer        Relevant Orders    Mammo Screening Digital Tomosynthesis Bilateral With CAD                    An After Visit Summary and PPPS were given to the patient.       Discussed injury prevention, diet and exercise, safe sexual practices, and screening for common diseases. Encouraged use of sunscreen and seatbelts. Discussed timing of  cervical cancer screening. Encouraged monthly self-breast exams, yearly clinical breast exams, and discussed timing of mammograms. Avoidance of tobacco encouraged. Limitation or avoidance of alcohol encouraged. Recommend yearly dental and eye exams. Also discussed monitoring of blood pressure, lipids.         +++++E/M portion medically necessary secondary to new or uncontrolled chronic problem+++++++    Subjective   Radha Tobisa is here for:    Chief Complaint   Patient presents with    Annual Exam    Hypertension    Heartburn       Hypertension  This is a chronic problem. The current episode started more than 1 year ago. The problem is unchanged. The problem is controlled. Associated symptoms include anxiety. Pertinent negatives include no palpitations, peripheral edema or shortness of  breath. Current antihypertension treatment includes ACE inhibitors. The current treatment provides moderate improvement. There are no compliance problems.    Heartburn  She complains of heartburn. This is a recurrent problem. The current episode started more than 1 year ago. The problem occurs occasionally. The problem has been gradually worsening. The heartburn is located in the substernum. The heartburn is of mild intensity. The heartburn does not wake her from sleep. The symptoms are aggravated by certain foods. She has tried nothing for the symptoms.         Physical Exam:  Review of Systems   Respiratory:  Negative for shortness of breath.    Cardiovascular:  Negative for palpitations.        Physical Exam  Vitals and nursing note reviewed.   Constitutional:       General: She is not in acute distress.     Appearance: She is well-developed. She is not diaphoretic.   HENT:      Head: Normocephalic and atraumatic.      Right Ear: Tympanic membrane and external ear normal.      Left Ear: Tympanic membrane and external ear normal.      Nose: Nose normal.      Mouth/Throat:      Pharynx: No oropharyngeal exudate.   Eyes:      General: No scleral icterus.        Right eye: No discharge.         Left eye: No discharge.      Conjunctiva/sclera: Conjunctivae normal.      Pupils: Pupils are equal, round, and reactive to light.   Neck:      Thyroid: No thyromegaly.      Trachea: No tracheal deviation.   Cardiovascular:      Rate and Rhythm: Normal rate and regular rhythm.      Heart sounds: Normal heart sounds. No murmur heard.     No friction rub. No gallop.   Pulmonary:      Effort: Pulmonary effort is normal. No respiratory distress.      Breath sounds: Normal breath sounds. No stridor. No wheezing or rales.   Chest:   Breasts:     Right: Normal. No swelling, bleeding, inverted nipple, mass, nipple discharge, skin change or tenderness.      Left: Normal. No swelling, bleeding, inverted nipple, mass, nipple discharge,  skin change or tenderness.   Abdominal:      General: Bowel sounds are normal. There is no distension.      Palpations: Abdomen is soft. There is no mass.      Tenderness: There is no abdominal tenderness. There is no guarding or rebound.   Genitourinary:     Comments: deferred  Musculoskeletal:         General: No tenderness or deformity. Normal range of motion.      Cervical back: Normal range of motion and neck supple.   Lymphadenopathy:      Cervical: No cervical adenopathy.   Skin:     General: Skin is warm and dry.      Capillary Refill: Capillary refill takes less than 2 seconds.      Coloration: Skin is not pale.      Findings: No erythema or rash.   Neurological:      Mental Status: She is alert and oriented to person, place, and time.      Cranial Nerves: No cranial nerve deficit.      Sensory: No sensory deficit.      Motor: No tremor, atrophy or abnormal muscle tone.      Coordination: Coordination normal.      Gait: Gait normal.      Deep Tendon Reflexes: Reflexes are normal and symmetric. Reflexes normal.   Psychiatric:         Behavior: Behavior normal.         Thought Content: Thought content normal.         Cognition and Memory: Memory is not impaired. She does not exhibit impaired recent memory or impaired remote memory.         Judgment: Judgment normal.       Assessment and Plan:  Problem List Items Addressed This Visit          Cardiac and Vasculature    Essential hypertension    Current Assessment & Plan     Hypertension is unchanged.  Continue current treatment regimen.  Dietary sodium restriction.  Weight loss.  Blood pressure will be reassessed in 3 months.         Pure hypercholesterolemia    Relevant Orders    Lipid Panel With / Chol / HDL Ratio (Completed)    Comprehensive Metabolic Panel (Completed)       ENT    Chronic rhinitis       Endocrine and Metabolic    Class 1 obesity due to excess calories with body mass index (BMI) of 30.0 to 30.9 in adult    Borderline diabetes    Current  Assessment & Plan     Unchanged         Relevant Orders    POC Glycosylated Hemoglobin (Hb A1C) (Completed)       Gastrointestinal Abdominal     GERD (gastroesophageal reflux disease)    Current Assessment & Plan     Worse  Start pepcid    Diagnosis, treatment and and course discussed. Potential side effects discussed. Return if there is worsening or persistence of symptoms.            Relevant Medications    famotidine (PEPCID) 20 MG tablet       Genitourinary and Reproductive     H/O total vaginal hysterectomy    Overview     For fibroids, one ovary on right she thinks.   2/2017          Other Visit Diagnoses       Encounter for routine adult physical exam with abnormal findings    -  Primary    Relevant Orders    POCT urinalysis dipstick, multipro (Completed)    CBC & Differential (Completed)    TSH (Completed)    Encounter for screening mammogram for malignant neoplasm of breast        Non-seasonal allergic rhinitis due to other allergic trigger        Relevant Medications    fluticasone (FLONASE) 50 MCG/ACT nasal spray    fexofenadine (ALLEGRA) 180 MG tablet    Screening mammogram for breast cancer        Relevant Orders    Mammo Screening Digital Tomosynthesis Bilateral With CAD

## 2023-12-11 ENCOUNTER — OFFICE VISIT (OUTPATIENT)
Dept: FAMILY MEDICINE CLINIC | Facility: CLINIC | Age: 53
End: 2023-12-11
Payer: COMMERCIAL

## 2023-12-11 VITALS
HEART RATE: 98 BPM | DIASTOLIC BLOOD PRESSURE: 80 MMHG | HEIGHT: 66 IN | OXYGEN SATURATION: 99 % | BODY MASS INDEX: 29.96 KG/M2 | RESPIRATION RATE: 20 BRPM | WEIGHT: 186.4 LBS | TEMPERATURE: 97.1 F | SYSTOLIC BLOOD PRESSURE: 128 MMHG

## 2023-12-11 DIAGNOSIS — Z12.31 ENCOUNTER FOR SCREENING MAMMOGRAM FOR MALIGNANT NEOPLASM OF BREAST: ICD-10-CM

## 2023-12-11 DIAGNOSIS — E78.00 PURE HYPERCHOLESTEROLEMIA: ICD-10-CM

## 2023-12-11 DIAGNOSIS — K21.9 GASTROESOPHAGEAL REFLUX DISEASE, UNSPECIFIED WHETHER ESOPHAGITIS PRESENT: ICD-10-CM

## 2023-12-11 DIAGNOSIS — Z00.01 ENCOUNTER FOR ROUTINE ADULT PHYSICAL EXAM WITH ABNORMAL FINDINGS: Primary | ICD-10-CM

## 2023-12-11 DIAGNOSIS — J31.0 CHRONIC RHINITIS: ICD-10-CM

## 2023-12-11 DIAGNOSIS — R73.03 BORDERLINE DIABETES: ICD-10-CM

## 2023-12-11 DIAGNOSIS — J30.89 NON-SEASONAL ALLERGIC RHINITIS DUE TO OTHER ALLERGIC TRIGGER: ICD-10-CM

## 2023-12-11 DIAGNOSIS — I10 ESSENTIAL HYPERTENSION: ICD-10-CM

## 2023-12-11 DIAGNOSIS — E66.09 CLASS 1 OBESITY DUE TO EXCESS CALORIES WITH SERIOUS COMORBIDITY AND BODY MASS INDEX (BMI) OF 30.0 TO 30.9 IN ADULT: ICD-10-CM

## 2023-12-11 DIAGNOSIS — Z12.31 SCREENING MAMMOGRAM FOR BREAST CANCER: ICD-10-CM

## 2023-12-11 DIAGNOSIS — Z90.710 H/O TOTAL VAGINAL HYSTERECTOMY: ICD-10-CM

## 2023-12-11 LAB
BILIRUB BLD-MCNC: NEGATIVE MG/DL
CLARITY, POC: CLEAR
COLOR UR: YELLOW
EXPIRATION DATE: ABNORMAL
GLUCOSE UR STRIP-MCNC: NEGATIVE MG/DL
HBA1C MFR BLD: 5.6 % (ref 4.5–5.7)
KETONES UR QL: NEGATIVE
LEUKOCYTE EST, POC: NEGATIVE
Lab: ABNORMAL
NITRITE UR-MCNC: NEGATIVE MG/ML
PH UR: 6.5 [PH] (ref 5–8)
PROT UR STRIP-MCNC: ABNORMAL MG/DL
RBC # UR STRIP: NEGATIVE /UL
SP GR UR: 1.01 (ref 1–1.03)
UROBILINOGEN UR QL: ABNORMAL

## 2023-12-11 RX ORDER — FLUTICASONE PROPIONATE 50 MCG
1 SPRAY, SUSPENSION (ML) NASAL DAILY
Qty: 16 G | Refills: 12 | Status: SHIPPED | OUTPATIENT
Start: 2023-12-11

## 2023-12-11 RX ORDER — FEXOFENADINE HCL 180 MG/1
180 TABLET ORAL DAILY
Qty: 30 TABLET | Refills: 12 | Status: SHIPPED | OUTPATIENT
Start: 2023-12-11

## 2023-12-11 RX ORDER — FAMOTIDINE 20 MG/1
20 TABLET, FILM COATED ORAL 2 TIMES DAILY
Qty: 60 TABLET | Refills: 5 | Status: SHIPPED | OUTPATIENT
Start: 2023-12-11

## 2023-12-12 LAB
ALBUMIN SERPL-MCNC: 4.6 G/DL (ref 3.8–4.9)
ALBUMIN/GLOB SERPL: 1.6 {RATIO} (ref 1.2–2.2)
ALP SERPL-CCNC: 120 IU/L (ref 44–121)
ALT SERPL-CCNC: 19 IU/L (ref 0–32)
AST SERPL-CCNC: 17 IU/L (ref 0–40)
BASOPHILS # BLD AUTO: 0 X10E3/UL (ref 0–0.2)
BASOPHILS NFR BLD AUTO: 0 %
BILIRUB SERPL-MCNC: 0.4 MG/DL (ref 0–1.2)
BUN SERPL-MCNC: 15 MG/DL (ref 6–24)
BUN/CREAT SERPL: 17 (ref 9–23)
CALCIUM SERPL-MCNC: 9.2 MG/DL (ref 8.7–10.2)
CHLORIDE SERPL-SCNC: 102 MMOL/L (ref 96–106)
CHOLEST SERPL-MCNC: 236 MG/DL (ref 100–199)
CHOLEST/HDLC SERPL: 3.2 RATIO (ref 0–4.4)
CO2 SERPL-SCNC: 22 MMOL/L (ref 20–29)
CREAT SERPL-MCNC: 0.89 MG/DL (ref 0.57–1)
EGFRCR SERPLBLD CKD-EPI 2021: 78 ML/MIN/1.73
EOSINOPHIL # BLD AUTO: 0.1 X10E3/UL (ref 0–0.4)
EOSINOPHIL NFR BLD AUTO: 2 %
ERYTHROCYTE [DISTWIDTH] IN BLOOD BY AUTOMATED COUNT: 13 % (ref 11.7–15.4)
GLOBULIN SER CALC-MCNC: 2.8 G/DL (ref 1.5–4.5)
GLUCOSE SERPL-MCNC: 92 MG/DL (ref 70–99)
HCT VFR BLD AUTO: 39.9 % (ref 34–46.6)
HDLC SERPL-MCNC: 73 MG/DL
HGB BLD-MCNC: 13.2 G/DL (ref 11.1–15.9)
IMM GRANULOCYTES # BLD AUTO: 0 X10E3/UL (ref 0–0.1)
IMM GRANULOCYTES NFR BLD AUTO: 0 %
LDLC SERPL CALC-MCNC: 141 MG/DL (ref 0–99)
LYMPHOCYTES # BLD AUTO: 1.8 X10E3/UL (ref 0.7–3.1)
LYMPHOCYTES NFR BLD AUTO: 22 %
MCH RBC QN AUTO: 31.3 PG (ref 26.6–33)
MCHC RBC AUTO-ENTMCNC: 33.1 G/DL (ref 31.5–35.7)
MCV RBC AUTO: 95 FL (ref 79–97)
MONOCYTES # BLD AUTO: 0.8 X10E3/UL (ref 0.1–0.9)
MONOCYTES NFR BLD AUTO: 9 %
NEUTROPHILS # BLD AUTO: 5.4 X10E3/UL (ref 1.4–7)
NEUTROPHILS NFR BLD AUTO: 67 %
PLATELET # BLD AUTO: 300 X10E3/UL (ref 150–450)
POTASSIUM SERPL-SCNC: 4.8 MMOL/L (ref 3.5–5.2)
PROT SERPL-MCNC: 7.4 G/DL (ref 6–8.5)
RBC # BLD AUTO: 4.22 X10E6/UL (ref 3.77–5.28)
SODIUM SERPL-SCNC: 142 MMOL/L (ref 134–144)
TRIGL SERPL-MCNC: 127 MG/DL (ref 0–149)
TSH SERPL DL<=0.005 MIU/L-ACNC: 0.8 UIU/ML (ref 0.45–4.5)
VLDLC SERPL CALC-MCNC: 22 MG/DL (ref 5–40)
WBC # BLD AUTO: 8.1 X10E3/UL (ref 3.4–10.8)

## 2023-12-15 DIAGNOSIS — E78.00 PURE HYPERCHOLESTEROLEMIA: Primary | ICD-10-CM

## 2023-12-15 RX ORDER — ROSUVASTATIN CALCIUM 5 MG/1
5 TABLET, COATED ORAL DAILY
Qty: 30 TABLET | Refills: 12 | Status: SHIPPED | OUTPATIENT
Start: 2023-12-15 | End: 2023-12-18 | Stop reason: SDUPTHER

## 2023-12-16 LAB — NONINV COLON CA DNA+OCC BLD SCRN STL QL: POSITIVE

## 2023-12-18 ENCOUNTER — TELEPHONE (OUTPATIENT)
Dept: FAMILY MEDICINE CLINIC | Facility: CLINIC | Age: 53
End: 2023-12-18
Payer: COMMERCIAL

## 2023-12-18 DIAGNOSIS — E78.00 PURE HYPERCHOLESTEROLEMIA: ICD-10-CM

## 2023-12-18 DIAGNOSIS — R19.5 POSITIVE COLORECTAL CANCER SCREENING USING COLOGUARD TEST: Primary | ICD-10-CM

## 2023-12-18 RX ORDER — ROSUVASTATIN CALCIUM 5 MG/1
5 TABLET, COATED ORAL DAILY
Qty: 30 TABLET | Refills: 12 | Status: SHIPPED | OUTPATIENT
Start: 2023-12-18

## 2023-12-18 NOTE — TELEPHONE ENCOUNTER
----- Message from Julianne Glez MD sent at 12/17/2023  6:31 PM EST -----  Cologuard is positive. She needs a colonscopy

## 2023-12-18 NOTE — TELEPHONE ENCOUNTER
Called pt and let her know of her results. She is fine with referral to [facility]

## 2023-12-21 NOTE — ASSESSMENT & PLAN NOTE
Worse  Start pepcid    Diagnosis, treatment and and course discussed. Potential side effects discussed. Return if there is worsening or persistence of symptoms.

## 2023-12-22 ENCOUNTER — TELEPHONE (OUTPATIENT)
Dept: FAMILY MEDICINE CLINIC | Facility: CLINIC | Age: 53
End: 2023-12-22
Payer: COMMERCIAL

## 2023-12-22 NOTE — TELEPHONE ENCOUNTER
----- Message from Radha Tobias sent at 12/22/2023  7:13 AM EST -----  Regarding: Crestor   Contact: [phone]  I started the Crestor and I think it's making my legs feel achy. Should I continue to see if it goes away?

## 2024-01-03 ENCOUNTER — HOSPITAL ENCOUNTER (OUTPATIENT)
Dept: MAMMOGRAPHY | Facility: HOSPITAL | Age: 54
Discharge: HOME | End: 2024-01-03
Admitting: FAMILY MEDICINE
Payer: COMMERCIAL

## 2024-01-03 DIAGNOSIS — Z12.31 SCREENING MAMMOGRAM FOR BREAST CANCER: ICD-10-CM

## 2024-01-03 PROCEDURE — 77067 SCR MAMMO BI INCL CAD: CPT

## 2024-01-03 PROCEDURE — 77063 BREAST TOMOSYNTHESIS BI: CPT

## 2024-01-16 DIAGNOSIS — E78.00 PURE HYPERCHOLESTEROLEMIA: ICD-10-CM

## 2024-01-16 RX ORDER — ROSUVASTATIN CALCIUM 5 MG/1
5 TABLET, COATED ORAL DAILY
Qty: 30 TABLET | Refills: 12 | Status: SHIPPED | OUTPATIENT
Start: 2024-01-16

## 2024-03-11 NOTE — PROGRESS NOTES
Subjective   Radha Tobias is a 53 y.o. female. Presents to [facility]    Chief Complaint   Patient presents with    Anxiety       Anxiety  Presents for follow-up visit. Symptoms include depressed mood, irritability, nervous/anxious behavior and restlessness. Patient reports no chest pain, decreased concentration, excessive worry, hyperventilation, insomnia, palpitations or panic. Symptoms occur most days. The severity of symptoms is moderate. The patient sleeps 6 hours per night. The quality of sleep is fair. Nighttime awakenings: occasional.        Her anxiety is worsening. Depression also. She was on wellbutrin before. She stopped it a while ago. Started to worsen a few months ago. No suicidal ideation    I personally reviewed and updated the patient's allergies, medications, problem list, and past medical, surgical, social, and family history. I have reviewed and confirmed the accuracy of the History of Present Illness and Review of Symptoms as documented by the MA/LPN/RN. Julianne Glez MD    Allergies:  Allergies   Allergen Reactions    Sulfa Antibiotics Palpitations       Social History:  Social History     Socioeconomic History    Marital status:    Tobacco Use    Smoking status: Former     Current packs/day: 0.00     Average packs/day: 0.5 packs/day for 21.9 years (10.9 ttl pk-yrs)     Types: Cigarettes     Start date:      Quit date: 2009     Years since quittin.3     Passive exposure: Past    Smokeless tobacco: Never   Vaping Use    Vaping status: Never Used   Substance and Sexual Activity    Alcohol use: Yes     Comment: weekly    Drug use: No    Sexual activity: Yes       Family History:  History reviewed. No pertinent family history.    Past Medical History :  Patient Active Problem List   Diagnosis    Essential hypertension    Easy bruising    Mild episode of recurrent major depressive disorder    Anxiety    H/O total vaginal hysterectomy    GERD  "(gastroesophageal reflux disease)    Pituitary adenoma    Low libido    Pure hypercholesterolemia    Pain of right scapula    Rib pain on right side    Pityriasis rosea    Class 1 obesity due to excess calories with body mass index (BMI) of 30.0 to 30.9 in adult    Borderline diabetes    Vaginal irritation    Colon cancer screening    Chronic rhinitis       Medication List:    Current Outpatient Medications:     famotidine (PEPCID) 20 MG tablet, Take 1 tablet by mouth 2 (Two) Times a Day., Disp: 60 tablet, Rfl: 5    fexofenadine (ALLEGRA) 180 MG tablet, Take 1 tablet by mouth Daily., Disp: 30 tablet, Rfl: 12    fluticasone (FLONASE) 50 MCG/ACT nasal spray, 1 spray into the nostril(s) as directed by provider Daily., Disp: 16 g, Rfl: 12    lisinopril-hydrochlorothiazide (PRINZIDE,ZESTORETIC) 20-12.5 MG per tablet, TAKE 1 TABLET BY MOUTH DAILY, Disp: 30 tablet, Rfl: 12    rosuvastatin (CRESTOR) 5 MG tablet, Take 1 tablet by mouth Daily., Disp: 30 tablet, Rfl: 12    escitalopram (LEXAPRO) 10 MG tablet, Take 1 tablet by mouth Daily., Disp: 30 tablet, Rfl: 1    hydrOXYzine (ATARAX) 25 MG tablet, Take 1 tablet by mouth 3 (Three) Times a Day As Needed for Anxiety., Disp: 90 tablet, Rfl: 0    Past Surgical History:  Past Surgical History:   Procedure Laterality Date    APPENDECTOMY      HYSTERECTOMY      for fibroids, not cancer    TUBAL ABDOMINAL LIGATION           Physical Exam:      Vital Signs:    Vitals:    03/18/24 1037   BP: 122/82   Pulse: 108   Resp: 17   Temp: 97.1 °F (36.2 °C)   SpO2: 99%        /82 (BP Location: Right arm, Patient Position: Sitting, Cuff Size: Adult)   Pulse 108   Temp 97.1 °F (36.2 °C) (Temporal)   Resp 17   Ht 167.6 cm (65.98\")   Wt 85 kg (187 lb 6.4 oz)   LMP  (LMP Unknown)   SpO2 99% Comment: room air  BMI 30.27 kg/m²     Wt Readings from Last 3 Encounters:   03/18/24 85 kg (187 lb 6.4 oz)   12/11/23 84.6 kg (186 lb 6.4 oz)   11/30/23 86.6 kg (191 lb)       Result Review : "                Physical Exam  Vitals reviewed.   Constitutional:       Appearance: Normal appearance. She is well-developed.   HENT:      Head: Normocephalic and atraumatic.   Eyes:      General:         Right eye: No discharge.         Left eye: No discharge.   Cardiovascular:      Rate and Rhythm: Normal rate and regular rhythm.      Heart sounds: Normal heart sounds. No murmur heard.     No friction rub. No gallop.   Pulmonary:      Effort: Pulmonary effort is normal. No respiratory distress.      Breath sounds: Normal breath sounds. No wheezing or rales.   Skin:     General: Skin is warm and dry.      Findings: No rash.   Neurological:      Mental Status: She is alert and oriented to person, place, and time.      Coordination: Coordination normal.      Gait: Gait normal.   Psychiatric:         Behavior: Behavior is cooperative.         Assessment and Plan:  Problems Addressed this Visit          Endocrine and Metabolic    Class 1 obesity due to excess calories with body mass index (BMI) of 30.0 to 30.9 in adult       Mental Health    Mild episode of recurrent major depressive disorder    Relevant Medications    escitalopram (LEXAPRO) 10 MG tablet    hydrOXYzine (ATARAX) 25 MG tablet    Anxiety - Primary     Worsening   Start lexapro    Diagnosis, treatment and and course discussed. Potential side effects discussed. Return if there is worsening or persistence of symptoms.     Good social support, no suicidal or homicidal ideation. Diagnosis and treatment discussed. Discussed counseling. Discussed medication, dosing and adverse effects. Return in 4 weeks           Relevant Medications    escitalopram (LEXAPRO) 10 MG tablet    hydrOXYzine (ATARAX) 25 MG tablet     Diagnoses         Codes Comments    Anxiety    -  Primary ICD-10-CM: F41.9  ICD-9-CM: 300.00     Class 1 obesity due to excess calories with serious comorbidity and body mass index (BMI) of 30.0 to 30.9 in adult     ICD-10-CM: E66.09, Z68.30  ICD-9-CM:  278.00, V85.30     Mild episode of recurrent major depressive disorder     ICD-10-CM: F33.0  ICD-9-CM: 296.31                     An After Visit Summary and PPPS were given to the patient.

## 2024-03-18 ENCOUNTER — OFFICE VISIT (OUTPATIENT)
Dept: FAMILY MEDICINE CLINIC | Facility: CLINIC | Age: 54
End: 2024-03-18
Payer: COMMERCIAL

## 2024-03-18 VITALS
DIASTOLIC BLOOD PRESSURE: 82 MMHG | RESPIRATION RATE: 17 BRPM | SYSTOLIC BLOOD PRESSURE: 122 MMHG | HEIGHT: 66 IN | WEIGHT: 187.4 LBS | TEMPERATURE: 97.1 F | BODY MASS INDEX: 30.12 KG/M2 | HEART RATE: 108 BPM | OXYGEN SATURATION: 99 %

## 2024-03-18 DIAGNOSIS — F33.0 MILD EPISODE OF RECURRENT MAJOR DEPRESSIVE DISORDER: ICD-10-CM

## 2024-03-18 DIAGNOSIS — F41.9 ANXIETY: Primary | ICD-10-CM

## 2024-03-18 DIAGNOSIS — E66.09 CLASS 1 OBESITY DUE TO EXCESS CALORIES WITH SERIOUS COMORBIDITY AND BODY MASS INDEX (BMI) OF 30.0 TO 30.9 IN ADULT: ICD-10-CM

## 2024-03-18 PROCEDURE — 99214 OFFICE O/P EST MOD 30 MIN: CPT | Performed by: FAMILY MEDICINE

## 2024-03-18 RX ORDER — ESCITALOPRAM OXALATE 10 MG/1
10 TABLET ORAL DAILY
Qty: 30 TABLET | Refills: 1 | Status: SHIPPED | OUTPATIENT
Start: 2024-03-18

## 2024-03-18 RX ORDER — HYDROXYZINE HYDROCHLORIDE 25 MG/1
25 TABLET, FILM COATED ORAL 3 TIMES DAILY PRN
Qty: 90 TABLET | Refills: 0 | Status: SHIPPED | OUTPATIENT
Start: 2024-03-18

## 2024-03-18 NOTE — ASSESSMENT & PLAN NOTE
Worsening   Start lexapro    Diagnosis, treatment and and course discussed. Potential side effects discussed. Return if there is worsening or persistence of symptoms.     Good social support, no suicidal or homicidal ideation. Diagnosis and treatment discussed. Discussed counseling. Discussed medication, dosing and adverse effects. Return in 4 weeks

## 2024-04-08 ENCOUNTER — HOSPITAL ENCOUNTER (OUTPATIENT)
Facility: HOSPITAL | Age: 54
Setting detail: HOSPITAL OUTPATIENT SURGERY
Discharge: HOME | End: 2024-04-08
Attending: INTERNAL MEDICINE | Admitting: INTERNAL MEDICINE
Payer: COMMERCIAL

## 2024-04-08 ENCOUNTER — ON CAMPUS - OUTPATIENT (OUTPATIENT)
Dept: URBAN - METROPOLITAN AREA HOSPITAL 85 | Facility: HOSPITAL | Age: 54
End: 2024-04-08
Payer: COMMERCIAL

## 2024-04-08 ENCOUNTER — ANESTHESIA EVENT (OUTPATIENT)
Dept: GASTROENTEROLOGY | Facility: HOSPITAL | Age: 54
End: 2024-04-08
Payer: COMMERCIAL

## 2024-04-08 ENCOUNTER — ANESTHESIA (OUTPATIENT)
Dept: GASTROENTEROLOGY | Facility: HOSPITAL | Age: 54
End: 2024-04-08
Payer: COMMERCIAL

## 2024-04-08 VITALS
DIASTOLIC BLOOD PRESSURE: 96 MMHG | OXYGEN SATURATION: 96 % | SYSTOLIC BLOOD PRESSURE: 155 MMHG | BODY MASS INDEX: 29.57 KG/M2 | HEART RATE: 88 BPM | RESPIRATION RATE: 16 BRPM | WEIGHT: 184 LBS | TEMPERATURE: 98.5 F | HEIGHT: 66 IN

## 2024-04-08 DIAGNOSIS — D12.2 BENIGN NEOPLASM OF ASCENDING COLON: ICD-10-CM

## 2024-04-08 DIAGNOSIS — Z12.11 ENCOUNTER FOR SCREENING FOR MALIGNANT NEOPLASM OF COLON: ICD-10-CM

## 2024-04-08 DIAGNOSIS — K57.30 DIVERTICULOSIS OF LARGE INTESTINE WITHOUT PERFORATION OR ABS: ICD-10-CM

## 2024-04-08 DIAGNOSIS — R19.5 OTHER FECAL ABNORMALITIES: ICD-10-CM

## 2024-04-08 DIAGNOSIS — K63.5 POLYP OF COLON: ICD-10-CM

## 2024-04-08 DIAGNOSIS — R19.5 POSITIVE COLORECTAL CANCER SCREENING USING COLOGUARD TEST: ICD-10-CM

## 2024-04-08 DIAGNOSIS — K62.1 RECTAL POLYP: ICD-10-CM

## 2024-04-08 PROCEDURE — 25010000002 PROPOFOL 10 MG/ML EMULSION: Performed by: ANESTHESIOLOGIST ASSISTANT

## 2024-04-08 PROCEDURE — 25810000003 LACTATED RINGERS PER 1000 ML: Performed by: ANESTHESIOLOGIST ASSISTANT

## 2024-04-08 PROCEDURE — 88305 TISSUE EXAM BY PATHOLOGIST: CPT | Performed by: INTERNAL MEDICINE

## 2024-04-08 PROCEDURE — 45385 COLONOSCOPY W/LESION REMOVAL: CPT | Mod: 33 | Performed by: INTERNAL MEDICINE

## 2024-04-08 RX ORDER — SODIUM CHLORIDE, SODIUM LACTATE, POTASSIUM CHLORIDE, CALCIUM CHLORIDE 600; 310; 30; 20 MG/100ML; MG/100ML; MG/100ML; MG/100ML
INJECTION, SOLUTION INTRAVENOUS CONTINUOUS PRN
Status: DISCONTINUED | OUTPATIENT
Start: 2024-04-08 | End: 2024-04-08 | Stop reason: SURG

## 2024-04-08 RX ORDER — ONDANSETRON 2 MG/ML
4 INJECTION INTRAMUSCULAR; INTRAVENOUS ONCE AS NEEDED
Status: DISCONTINUED | OUTPATIENT
Start: 2024-04-08 | End: 2024-04-08 | Stop reason: HOSPADM

## 2024-04-08 RX ADMIN — PROPOFOL INJECTABLE EMULSION 150 MCG/KG/MIN: 10 INJECTION, EMULSION INTRAVENOUS at 11:01

## 2024-04-08 RX ADMIN — SODIUM CHLORIDE, SODIUM LACTATE, POTASSIUM CHLORIDE, AND CALCIUM CHLORIDE: .6; .31; .03; .02 INJECTION, SOLUTION INTRAVENOUS at 10:59

## 2024-04-08 NOTE — DISCHARGE INSTRUCTIONS
A responsible adult should stay with you and you should rest quietly for the rest of the day.    Do not drink alcohol, drive, operate any heavy machinery or power tools or make any legal/important decisions for the next 24 hours.     Progress your diet as tolerated.  If you begin to experience severe pain, increased shortness of breath, racing heartbeat or a fever above 101 F, seek immediate medical attention.     Follow up with MD as instructed. Call office for results in 3 to 5 business days if needed.     Dr. Jean [phone]    Impression:  Positive Cologuard with 5 polyps removed     Recommendations:  Follow-up biopsy results  Repeat colonoscopy in 5 years

## 2024-04-08 NOTE — ANESTHESIA POSTPROCEDURE EVALUATION
Patient: Radha Tobias    Procedure Summary       Date: 04/08/24 Room / Location: [facility] ENDOSCOPY 1 / [facility] ENDOSCOPY    Anesthesia Start: 1059 Anesthesia Stop: 1124    Procedure: COLONOSCOPY Diagnosis:       Positive colorectal cancer screening using Cologuard test      (Positive colorectal cancer screening using Cologuard test [R19.5])    Surgeons: Kelton Jean MD Provider: Lorenzo Cervantes MD    Anesthesia Type: general ASA Status: 2            Anesthesia Type: general    Vitals  Vitals Value Taken Time   /96 04/08/24 1146   Temp     Pulse 88 04/08/24 1146   Resp 16 04/08/24 1146   SpO2 96 % 04/08/24 1146           Post Anesthesia Care and Evaluation    Patient location during evaluation: PACU  Patient participation: complete - patient participated  Level of consciousness: awake  Pain scale: See nurse's notes for pain score.  Pain management: adequate    Airway patency: patent  Anesthetic complications: No anesthetic complications  PONV Status: none  Cardiovascular status: acceptable  Respiratory status: acceptable and spontaneous ventilation  Hydration status: acceptable    Comments: Patient seen and examined postoperatively; vital signs stable; SpO2 greater than or equal to 90%; cardiopulmonary status stable; nausea/vomiting adequately controlled; pain adequately controlled; no apparent anesthesia complications; patient discharged from anesthesia care when discharge criteria were met

## 2024-04-08 NOTE — OP NOTE
COLONOSCOPY Procedure Report    Patient Name:  Radha Tobias  YOB: 1970    Date of Surgery:  4/8/2024     Pre-Op Diagnosis:  Positive colorectal cancer screening using Cologuard test [R19.5]       Post-Op Diagnosis Codes:     * Positive colorectal cancer screening using Cologuard test [R19.5]  2 sessile ascending polyps measuring 4 and 5 mm removed via cold snare polypectomy  Single 4 mm sessile transverse polyp removed via cold snare polypectomy  2 sessile rectal polyps measuring 4 and 5 mm removed via cold snare polypectomy  Mild sigmoid diverticulosis with medium openings    Procedure/CPT® Codes:      Procedure(s):  COLONOSCOPY    Staff:  Surgeon(s):  Kelton Jean MD      Anesthesia: Monitored Anesthesia Care    Description of Procedure:  A description of the procedure as well as risks, benefits and alternative methods were explained to the patient who voiced understanding and signed the corresponding consent form. A physical exam was performed and vital signs were monitored throughout the procedure.    A rectal exam was performed which was normal. An Olympus colonoscope was placed into the rectum and proceeded under direct visualization through the colon until the cecum and appendiceal orifice were identified. Careful visualization occurred upon slow withdraw of the scope. The scope was then retroflexed and the distal rectum was visualized. The quality of the prep was good. The procedure was not difficult and there were no immediate complications.    Specimen:        See Below    Estimated blood loss: none    Complications:  None    Findings:  2 sessile ascending polyps measuring 4 and 5 mm removed via cold snare polypectomy  Single 4 mm sessile transverse polyp removed via cold snare polypectomy  2 sessile rectal polyps measuring 4 and 5 mm removed via cold snare polypectomy  Mild sigmoid diverticulosis with medium openings    Impression:  Positive Cologuard with 5 polyps  removed    Recommendations:  Follow-up biopsy results  Repeat colonoscopy in 5 years      Kelton Jean MD     Date: 4/8/2024    Time: 11:25 EDT

## 2024-04-08 NOTE — H&P
GI Procedure H&P:    Referring Provider:    Julianne Glez MD Harrell, Steven, MD    Chief complaint: <principal problem not specified>    Subjective .  Positive Cologuard    History of present illness:      Radha Tobias is a 53 y.o. female who presents today for Procedure(s):  COLONOSCOPY for the indications listed below.     The updated Patient Profile was reviewed prior to the procedure, in conjunction with the Physical Exam, including medical conditions, surgical procedures, medications, allergies, family history and social history.     Pre-operatively, I reviewed the indication(s) for the procedure, the risks of the procedure [including but not limited to: unexpected bleeding possibly requiring hospitalization and/or unplanned repeat procedures, perforation possibly requiring surgical treatment, missed lesions and complications of sedation/MAC (also explained by anesthesia staff)].     I have evaluated the patient for risks associated with the planned anesthesia and the procedure to be performed and find the patient an acceptable candidate for IV sedation.    Multiple opportunities were provided for any questions or concerns, and all questions were answered satisfactorily before any anesthesia was administered. We will proceed with the planned procedure.    Past Medical History:  Past Medical History:   Diagnosis Date    Anxiety     Depression     Hypertension        Past Surgical History:  Past Surgical History:   Procedure Laterality Date    APPENDECTOMY      HYSTERECTOMY      for fibroids, not cancer    TUBAL ABDOMINAL LIGATION         Social History:  Social History     Tobacco Use    Smoking status: Former     Current packs/day: 0.00     Average packs/day: 0.5 packs/day for 21.9 years (10.9 ttl pk-yrs)     Types: Cigarettes     Start date:      Quit date: 2009     Years since quittin.3     Passive exposure: Past    Smokeless tobacco: Never   Vaping Use    Vaping status: Never Used    Substance Use Topics    Alcohol use: Yes     Alcohol/week: 2.0 standard drinks of alcohol     Types: 1 Glasses of wine, 1 Shots of liquor per week     Comment: weekly    Drug use: No       Family History:  History reviewed. No pertinent family history.    Medications:  Medications Prior to Admission   Medication Sig Dispense Refill Last Dose    escitalopram (LEXAPRO) 10 MG tablet Take 1 tablet by mouth Daily. 30 tablet 1     famotidine (PEPCID) 20 MG tablet Take 1 tablet by mouth 2 (Two) Times a Day. 60 tablet 5     fexofenadine (ALLEGRA) 180 MG tablet Take 1 tablet by mouth Daily. 30 tablet 12     fluticasone (FLONASE) 50 MCG/ACT nasal spray 1 spray into the nostril(s) as directed by provider Daily. 16 g 12     hydrOXYzine (ATARAX) 25 MG tablet Take 1 tablet by mouth 3 (Three) Times a Day As Needed for Anxiety. (Patient taking differently: Take 1 tablet by mouth 3 (Three) Times a Day As Needed for Anxiety. As needed) 90 tablet 0 Patient Taking Differently    lisinopril-hydrochlorothiazide (PRINZIDE,ZESTORETIC) 20-12.5 MG per tablet TAKE 1 TABLET BY MOUTH DAILY 30 tablet 12     rosuvastatin (CRESTOR) 5 MG tablet Take 1 tablet by mouth Daily. 30 tablet 12     polyethylene glycol (MiraLax) 17 GM/SCOOP powder Take as directed per instructions for bowel prep 238 g 0     sorbitol 70 % solution solution Take 100 ml by mouth as directed for 1 day 100 mL 0        Scheduled Meds:  Continuous Infusions:No current facility-administered medications for this encounter.    PRN Meds:.    ALLERGIES:  Sulfa antibiotics    ROS:  The following systems were reviewed and negative;   Constitution:  No fevers, chills, no unintentional weight loss  Skin: no rash, no jaundice  Eyes:  No blurry vision, no eye pain  HENT:  No change in hearing or smell  Resp:  No dyspnea or cough  CV:  No chest pain or palpitations  :  No dysuria, hematuria  Musculoskeletal:  No leg cramps or arthralgias  Neuro:  No tremor, no numbness  Psych:  No  "depression or confsuion    Objective     Vital Signs:   Vitals:    03/25/24 1628 04/08/24 1003   BP:  145/95   Pulse:  105   Resp:  14   Temp:  98.5 °F (36.9 °C)   TempSrc:  Oral   SpO2:  96%   Weight: 84.8 kg (187 lb) 83.5 kg (184 lb)   Height: 167.6 cm (66\") 167.6 cm (66\")       Physical Exam:       General Appearance:    Awake and alert, in no acute distress   Head:    Normocephalic, without obvious abnormality, atraumatic   Throat:   No oral lesions, no thrush, oral mucosa moist   Lungs:     respirations regular, even and unlabored   Skin:   No rash, no jaundice       Results Review:  Lab Results (last 24 hours)       ** No results found for the last 24 hours. **            Imaging Results (Last 24 Hours)       ** No results found for the last 24 hours. **             I reviewed the patient's labs and imaging.    ASSESSMENT AND PLAN:  Positive Cologuard    Active Problems:    * No active hospital problems. *       Procedure(s):  COLONOSCOPY      I discussed the patients findings and my recommendations with the patient.    Kelton Jean MD  04/08/24  10:07 EDT    "

## 2024-04-08 NOTE — ANESTHESIA PREPROCEDURE EVALUATION
Anesthesia Evaluation     no history of anesthetic complications:   NPO Solid Status: > 8 hours  NPO Liquid Status: > 2 hours           Airway   Mallampati: II  TM distance: >3 FB  Neck ROM: full  No difficulty expected  Dental    (+) upper dentures    Pulmonary - normal exam   (+) a smoker Former,  Cardiovascular - normal exam    (+) hypertension, hyperlipidemia      Neuro/Psych  (+) psychiatric history Anxiety  GI/Hepatic/Renal/Endo    (+) obesity, GERD    Musculoskeletal     Abdominal  - normal exam   Substance History      OB/GYN          Other                          Anesthesia Plan    ASA 2     general     intravenous induction     Anesthetic plan, risks, benefits, and alternatives have been provided, discussed and informed consent has been obtained with: patient.    Plan discussed with CAA.        CODE STATUS:

## 2024-04-09 LAB
LAB AP CASE REPORT: NORMAL
PATH REPORT.FINAL DX SPEC: NORMAL
PATH REPORT.GROSS SPEC: NORMAL

## 2024-04-12 DIAGNOSIS — F41.9 ANXIETY: ICD-10-CM

## 2024-04-12 DIAGNOSIS — F33.0 MILD EPISODE OF RECURRENT MAJOR DEPRESSIVE DISORDER: ICD-10-CM

## 2024-04-12 RX ORDER — ESCITALOPRAM OXALATE 10 MG/1
10 TABLET ORAL DAILY
Qty: 30 TABLET | Refills: 5 | Status: SHIPPED | OUTPATIENT
Start: 2024-04-12

## 2024-05-02 NOTE — PROGRESS NOTES
Subjective   Radha Tobias is a 53 y.o. female. Presents to [facility]    Chief Complaint   Patient presents with    Anxiety    Hypertension    Hyperlipidemia    Arm Pain       Anxiety  Presents for follow-up visit. Patient reports no chest pain, decreased concentration, depressed mood, excessive worry, hyperventilation, insomnia, irritability, nervous/anxious behavior, palpitations, panic, restlessness or shortness of breath. Symptoms occur occasionally. The severity of symptoms is mild. The patient sleeps 8 hours per night. The quality of sleep is good. Nighttime awakenings: occasional.       Hypertension  This is a chronic problem. The current episode started more than 1 year ago. The problem is controlled. Associated symptoms include anxiety. Pertinent negatives include no chest pain, headaches, malaise/fatigue, palpitations or shortness of breath. Risk factors for coronary artery disease include dyslipidemia. Current antihypertension treatment includes ACE inhibitors. The current treatment provides moderate improvement.   Hyperlipidemia  She has no history of diabetes or obesity. Pertinent negatives include no chest pain or shortness of breath. She is currently on no antihyperlipidemic treatment. Risk factors for coronary artery disease include hypertension, dyslipidemia and post-menopausal.   Arm Pain   The incident occurred more than 1 week ago (few months). There was no injury mechanism. The pain is present in the right shoulder. The quality of the pain is described as aching. The pain radiates to the right arm. The pain is mild. Pertinent negatives include no chest pain, numbness or tingling. The symptoms are aggravated by movement and lifting. She has tried nothing for the symptoms.      Her lexapro is helping. She would like to continue it.     Her right shoulder is bothering her. Its worse at night. Certain movements hurt, kesha overhead.     I personally reviewed and updated the  patient's allergies, medications, problem list, and past medical, surgical, social, and family history. I have reviewed and confirmed the accuracy of the History of Present Illness and Review of Symptoms as documented by the MA/LPN/RN. Julianne Glez MD    Allergies:  Allergies   Allergen Reactions    Sulfa Antibiotics Palpitations       Social History:  Social History     Socioeconomic History    Marital status:    Tobacco Use    Smoking status: Former     Current packs/day: 0.00     Average packs/day: 0.5 packs/day for 21.9 years (10.9 ttl pk-yrs)     Types: Cigarettes     Start date:      Quit date: 2009     Years since quittin.4     Passive exposure: Past    Smokeless tobacco: Never   Vaping Use    Vaping status: Never Used   Substance and Sexual Activity    Alcohol use: Yes     Alcohol/week: 2.0 standard drinks of alcohol     Types: 1 Glasses of wine, 1 Shots of liquor per week     Comment: weekly    Drug use: No    Sexual activity: Yes       Family History:  No family history on file.    Past Medical History :  Patient Active Problem List   Diagnosis    Essential hypertension    Easy bruising    Mild episode of recurrent major depressive disorder    Anxiety    H/O total vaginal hysterectomy    GERD (gastroesophageal reflux disease)    Pituitary adenoma    Low libido    Pure hypercholesterolemia    Pain of right scapula    Rib pain on right side    Pityriasis rosea    Class 1 obesity due to excess calories with body mass index (BMI) of 31.0 to 31.9 in adult    Borderline diabetes    Colon cancer screening    Chronic rhinitis       Medication List:    Current Outpatient Medications:     escitalopram (LEXAPRO) 10 MG tablet, Take 1 tablet by mouth Daily., Disp: 90 tablet, Rfl: 3    famotidine (PEPCID) 20 MG tablet, Take 1 tablet by mouth 2 (Two) Times a Day., Disp: 60 tablet, Rfl: 5    fexofenadine (ALLEGRA) 180 MG tablet, Take 1 tablet by mouth Daily., Disp: 30 tablet, Rfl: 12     "fluticasone (FLONASE) 50 MCG/ACT nasal spray, 1 spray into the nostril(s) as directed by provider Daily., Disp: 16 g, Rfl: 12    hydrOXYzine (ATARAX) 25 MG tablet, Take 1 tablet by mouth 3 (Three) Times a Day As Needed for Anxiety. (Patient taking differently: Take 1 tablet by mouth 3 (Three) Times a Day As Needed for Anxiety. As needed), Disp: 90 tablet, Rfl: 0    lisinopril-hydrochlorothiazide (PRINZIDE,ZESTORETIC) 20-12.5 MG per tablet, TAKE 1 TABLET BY MOUTH DAILY, Disp: 30 tablet, Rfl: 12    polyethylene glycol (MiraLax) 17 GM/SCOOP powder, Take as directed per instructions for bowel prep, Disp: 238 g, Rfl: 0    rosuvastatin (CRESTOR) 5 MG tablet, Take 1 tablet by mouth Daily., Disp: 30 tablet, Rfl: 12    tiZANidine (ZANAFLEX) 4 MG tablet, Take 1 tablet by mouth At Night As Needed for Muscle Spasms., Disp: 30 tablet, Rfl: 0    Past Surgical History:  Past Surgical History:   Procedure Laterality Date    APPENDECTOMY      COLONOSCOPY N/A 4/8/2024    Procedure: COLONOSCOPY with polypectomy;  Surgeon: Kelton Jean MD;  Location: [facility];  Service: Gastroenterology;  Laterality: N/A;  diverticulosis, colon polyps    HYSTERECTOMY      for fibroids, not cancer    TUBAL ABDOMINAL LIGATION           Physical Exam:      Vital Signs:    Vitals:    05/06/24 0858   BP: 116/84   Pulse: 63   Resp: 18   Temp: 97.3 °F (36.3 °C)   SpO2: 98%        /84 (BP Location: Right arm, Patient Position: Sitting, Cuff Size: Adult)   Pulse 63   Temp 97.3 °F (36.3 °C) (Temporal)   Resp 18   Ht 167.6 cm (66\")   Wt 87.3 kg (192 lb 6.4 oz)   LMP  (LMP Unknown)   SpO2 98% Comment: room air  BMI 31.05 kg/m²     Wt Readings from Last 3 Encounters:   05/06/24 87.3 kg (192 lb 6.4 oz)   04/08/24 83.5 kg (184 lb)   03/18/24 85 kg (187 lb 6.4 oz)       Result Review :                Physical Exam  Vitals reviewed.   Constitutional:       Appearance: Normal appearance. She is well-developed.   HENT:      Head: Normocephalic " and atraumatic.   Eyes:      General:         Right eye: No discharge.         Left eye: No discharge.   Cardiovascular:      Rate and Rhythm: Normal rate and regular rhythm.      Heart sounds: Normal heart sounds. No murmur heard.     No friction rub. No gallop.   Pulmonary:      Effort: Pulmonary effort is normal. No respiratory distress.      Breath sounds: Normal breath sounds. No wheezing or rales.   Musculoskeletal:      Right shoulder: No swelling, bony tenderness or crepitus. Normal range of motion.   Skin:     General: Skin is warm and dry.      Findings: No rash.   Neurological:      Mental Status: She is alert and oriented to person, place, and time.      Coordination: Coordination normal.      Gait: Gait normal.   Psychiatric:         Behavior: Behavior is cooperative.         Assessment and Plan:  Problems Addressed this Visit          Cardiac and Vasculature    Essential hypertension     Hypertension is stable and controlled  Continue current treatment regimen.  Dietary sodium restriction.  Weight loss.  Blood pressure will be reassessed in 3 months.         Relevant Orders    Comprehensive Metabolic Panel (Completed)    Pure hypercholesterolemia      She is on crestor  Will check labs  Continue current treatment         Relevant Orders    Lipid Panel With / Chol / HDL Ratio (Completed)       Endocrine and Metabolic    Class 1 obesity due to excess calories with body mass index (BMI) of 31.0 to 31.9 in adult    Borderline diabetes     Will get A1C         Relevant Orders    Hemoglobin A1c (Completed)       Mental Health    Mild episode of recurrent major depressive disorder     Patient's depression is a recurrent episode that is mild without psychosis. Depression is active and stable.    Plan:   Continue current medication therapy     Follow up as needed         Relevant Medications    escitalopram (LEXAPRO) 10 MG tablet    Anxiety - Primary    Relevant Medications    escitalopram (LEXAPRO) 10 MG  tablet       Musculoskeletal and Injuries    Pain of right scapula     Diagnosis, treatment and and course discussed. Potential side effects discussed. Return if there is worsening or persistence of symptoms.     Ice three times a day for about 10-15 minutes for the first 1-2 days. Then may alternate heat and ice. Better body mechanics discussed. Home exercises discussed and hand out given. Discussed nsaids and if they can be taken. May need imaging and or PT if persists.  Discussed red flags, if there is severe pain, fever with pain, loss of movement in one or both legs pain, numbness in groin or both legs, trouble urinating or defecating on oneself, then patient is to go to the ER.            Relevant Medications    tiZANidine (ZANAFLEX) 4 MG tablet     Other Visit Diagnoses       Chronic right shoulder pain        most likey OA  Will get xray and place in PT, exercises given    Relevant Orders    XR Shoulder 2+ View Right (Completed)    Ambulatory Referral to Physical Therapy for Evaluation & Treatment          Diagnoses         Codes Comments    Anxiety    -  Primary ICD-10-CM: F41.9  ICD-9-CM: 300.00     Essential hypertension     ICD-10-CM: I10  ICD-9-CM: 401.9     Pure hypercholesterolemia     ICD-10-CM: E78.00  ICD-9-CM: 272.0     Chronic right shoulder pain     ICD-10-CM: M25.511, G89.29  ICD-9-CM: 719.41, 338.29 most likey OA  Will get xray and place in PT, exercises given    Class 1 obesity due to excess calories with serious comorbidity and body mass index (BMI) of 31.0 to 31.9 in adult     ICD-10-CM: E66.09, Z68.31  ICD-9-CM: 278.00, V85.31     Borderline diabetes     ICD-10-CM: R73.03  ICD-9-CM: 790.29     Mild episode of recurrent major depressive disorder     ICD-10-CM: F33.0  ICD-9-CM: 296.31     Pain of right scapula     ICD-10-CM: M89.8X1  ICD-9-CM: 733.90                          An After Visit Summary and PPPS were given to the patient.

## 2024-05-06 ENCOUNTER — OFFICE VISIT (OUTPATIENT)
Dept: FAMILY MEDICINE CLINIC | Facility: CLINIC | Age: 54
End: 2024-05-06
Payer: COMMERCIAL

## 2024-05-06 ENCOUNTER — HOSPITAL ENCOUNTER (OUTPATIENT)
Dept: GENERAL RADIOLOGY | Facility: HOSPITAL | Age: 54
Discharge: HOME | End: 2024-05-06
Admitting: FAMILY MEDICINE
Payer: COMMERCIAL

## 2024-05-06 VITALS
BODY MASS INDEX: 30.92 KG/M2 | TEMPERATURE: 97.3 F | SYSTOLIC BLOOD PRESSURE: 116 MMHG | HEIGHT: 66 IN | RESPIRATION RATE: 18 BRPM | HEART RATE: 63 BPM | WEIGHT: 192.4 LBS | DIASTOLIC BLOOD PRESSURE: 84 MMHG | OXYGEN SATURATION: 98 %

## 2024-05-06 DIAGNOSIS — R73.03 BORDERLINE DIABETES: ICD-10-CM

## 2024-05-06 DIAGNOSIS — F41.9 ANXIETY: Primary | ICD-10-CM

## 2024-05-06 DIAGNOSIS — M25.511 CHRONIC RIGHT SHOULDER PAIN: ICD-10-CM

## 2024-05-06 DIAGNOSIS — G89.29 CHRONIC RIGHT SHOULDER PAIN: ICD-10-CM

## 2024-05-06 DIAGNOSIS — E66.09 CLASS 1 OBESITY DUE TO EXCESS CALORIES WITH SERIOUS COMORBIDITY AND BODY MASS INDEX (BMI) OF 31.0 TO 31.9 IN ADULT: ICD-10-CM

## 2024-05-06 DIAGNOSIS — I10 ESSENTIAL HYPERTENSION: ICD-10-CM

## 2024-05-06 DIAGNOSIS — M89.8X1 PAIN OF RIGHT SCAPULA: ICD-10-CM

## 2024-05-06 DIAGNOSIS — F33.0 MILD EPISODE OF RECURRENT MAJOR DEPRESSIVE DISORDER: ICD-10-CM

## 2024-05-06 DIAGNOSIS — E78.00 PURE HYPERCHOLESTEROLEMIA: ICD-10-CM

## 2024-05-06 PROBLEM — N89.8 VAGINAL IRRITATION: Status: RESOLVED | Noted: 2023-11-06 | Resolved: 2024-05-06

## 2024-05-06 PROCEDURE — 99214 OFFICE O/P EST MOD 30 MIN: CPT | Performed by: FAMILY MEDICINE

## 2024-05-06 PROCEDURE — 73030 X-RAY EXAM OF SHOULDER: CPT

## 2024-05-06 RX ORDER — TIZANIDINE 4 MG/1
4 TABLET ORAL NIGHTLY PRN
Qty: 30 TABLET | Refills: 0 | Status: SHIPPED | OUTPATIENT
Start: 2024-05-06

## 2024-05-06 RX ORDER — ESCITALOPRAM OXALATE 10 MG/1
10 TABLET ORAL DAILY
Qty: 90 TABLET | Refills: 3 | Status: SHIPPED | OUTPATIENT
Start: 2024-05-06

## 2024-05-07 LAB
ALBUMIN SERPL-MCNC: 4.6 G/DL (ref 3.8–4.9)
ALBUMIN/GLOB SERPL: 1.8 {RATIO} (ref 1.2–2.2)
ALP SERPL-CCNC: 110 IU/L (ref 44–121)
ALT SERPL-CCNC: 14 IU/L (ref 0–32)
AST SERPL-CCNC: 15 IU/L (ref 0–40)
BILIRUB SERPL-MCNC: 0.3 MG/DL (ref 0–1.2)
BUN SERPL-MCNC: 15 MG/DL (ref 6–24)
BUN/CREAT SERPL: 19 (ref 9–23)
CALCIUM SERPL-MCNC: 9.4 MG/DL (ref 8.7–10.2)
CHLORIDE SERPL-SCNC: 101 MMOL/L (ref 96–106)
CHOLEST SERPL-MCNC: 192 MG/DL (ref 100–199)
CHOLEST/HDLC SERPL: 2.3 RATIO (ref 0–4.4)
CO2 SERPL-SCNC: 25 MMOL/L (ref 20–29)
CREAT SERPL-MCNC: 0.79 MG/DL (ref 0.57–1)
EGFRCR SERPLBLD CKD-EPI 2021: 90 ML/MIN/1.73
GLOBULIN SER CALC-MCNC: 2.5 G/DL (ref 1.5–4.5)
GLUCOSE SERPL-MCNC: 101 MG/DL (ref 70–99)
HBA1C MFR BLD: 5.9 % (ref 4.8–5.6)
HDLC SERPL-MCNC: 84 MG/DL
LDLC SERPL CALC-MCNC: 90 MG/DL (ref 0–99)
POTASSIUM SERPL-SCNC: 4.6 MMOL/L (ref 3.5–5.2)
PROT SERPL-MCNC: 7.1 G/DL (ref 6–8.5)
SODIUM SERPL-SCNC: 139 MMOL/L (ref 134–144)
TRIGL SERPL-MCNC: 105 MG/DL (ref 0–149)
VLDLC SERPL CALC-MCNC: 18 MG/DL (ref 5–40)

## 2024-05-08 ENCOUNTER — TELEPHONE (OUTPATIENT)
Dept: FAMILY MEDICINE CLINIC | Facility: CLINIC | Age: 54
End: 2024-05-08
Payer: COMMERCIAL

## 2024-05-19 NOTE — ASSESSMENT & PLAN NOTE
Hypertension is stable and controlled  Continue current treatment regimen.  Dietary sodium restriction.  Weight loss.  Blood pressure will be reassessed in 3 months.

## 2024-05-19 NOTE — ASSESSMENT & PLAN NOTE
Patient's depression is a recurrent episode that is mild without psychosis. Depression is active and stable.    Plan:   Continue current medication therapy     Follow up as needed

## 2024-05-29 ENCOUNTER — TREATMENT (OUTPATIENT)
Dept: PHYSICAL THERAPY | Facility: CLINIC | Age: 54
End: 2024-05-29
Payer: COMMERCIAL

## 2024-05-29 DIAGNOSIS — G89.29 CHRONIC RIGHT SHOULDER PAIN: Primary | ICD-10-CM

## 2024-05-29 DIAGNOSIS — M25.511 CHRONIC RIGHT SHOULDER PAIN: Primary | ICD-10-CM

## 2024-05-29 PROCEDURE — 97535 SELF CARE MNGMENT TRAINING: CPT | Performed by: PHYSICAL THERAPIST

## 2024-05-29 PROCEDURE — 97161 PT EVAL LOW COMPLEX 20 MIN: CPT | Performed by: PHYSICAL THERAPIST

## 2024-05-29 PROCEDURE — 97110 THERAPEUTIC EXERCISES: CPT | Performed by: PHYSICAL THERAPIST

## 2024-05-29 NOTE — PROGRESS NOTES
Physical Therapy Initial Evaluation and Plan of Care        [address] [facility], [address] [place] [address]     Patient: Radha Tobias   : 1970  Diagnosis/ICD-10 Code:  Chronic right shoulder pain [M25.511, G89.29]  Referring practitioner: Julianne Glez MD  Date of Initial Visit: 2024  Today's Date: 2024  Patient seen for 1 sessions           Subjective Questionnaire: QuickDASH: 43% limited      Subjective Evaluation    History of Present Illness  Onset date: 3 months ago.  Mechanism of injury: Radha presents to OP PT with R shoulder pain.  She states she noticed some aching and radiating pain into her R lateral UE to the elbow.  She is not able to sleep, wash her hair or make the bed or lift her grand babies or heavy items due to the pain.        Patient Occupation:  Pain  Current pain ratin  At best pain ratin  At worst pain ratin  Location: R upper arm/shoulder  Quality: dull ache and radiating  Relieving factors: rest and change in position  Aggravating factors: sleeping, lifting, prolonged positioning and overhead activity  Progression: no change    Social Support  Lives with: spouse    Hand dominance: left    Diagnostic Tests  X-ray: normal (3/6/24: negative R shoulder)    Treatments  Current treatment: physical therapy  Patient Goals  Patient goals for therapy: increased strength, decreased pain, increased motion and independence with ADLs/IADLs  Patient goal:  her grandbabies         Precautions: anxiety, depression     Objective          Postural Observations  Seated posture: fair  Correction of posture: has no consistent effect    Additional Postural Observation Details  B rounded shoulders and protracted scapulae B with forward head/slumped posture    Palpation   Left   No palpable tenderness to the biceps, infraspinatus, middle trapezius, serratus anterior and triceps.   Hypertonic in the levator scapulae, posterior deltoid and upper trapezius.    Tenderness of the anterior deltoid, supraspinatus and teres minor.   Trigger point to upper trapezius.     Right   No palpable tenderness to the biceps.   Hypertonic in the anterior deltoid, biceps, levator scapulae, middle trapezius, pectoralis minor, thoracic paraspinals and upper trapezius. Tenderness of the anterior deltoid, infraspinatus, levator scapulae, middle trapezius, pectoralis minor, serratus anterior, supraspinatus, teres minor, thoracic paraspinals and triceps.   Trigger point to upper trapezius.     Tenderness     Left Shoulder   Tenderness in the subacromial bursa.     Right Shoulder  Tenderness in the medial scapula, subacromial bursa and supraspinatus tendon. No tenderness in the acromion, bicipital groove and clavicle.     Cervical/Thoracic Screen   Cervical range of motion within normal limits  Cervical range of motion within normal limits with the following exceptions: LF L = 20 deg, R = 18 deg and 38 deg of cerv flexion    Neurological Testing     Sensation     Shoulder   Left Shoulder   Intact: light touch    Right Shoulder   Intact: Light touch    Additional Neurological Details  Denies paraesthesias in B UE    Active Range of Motion     Right Shoulder   Flexion: 164 degrees with pain  Extension: 60 degrees   Abduction: 178 degrees with pain  External rotation 45°: 55 degrees with pain  Internal rotation 90°: 80 degrees     Scapular Mobility     Right Shoulder   Scapular mobility: WFL    Joint Play     Right Shoulder  Joints within functional limits are the anterior capsule, posterior capsule, inferior capsule and 1st rib.     Strength/Myotome Testing     Left Shoulder     Planes of Motion   Flexion: 4+   Abduction: 5     Right Shoulder     Planes of Motion   Flexion: 4 (painful)   Extension: 4+   Abduction: 4 (painful)   Adduction: 5   External rotation at 90°: 4- (painful)   Internal rotation at 0°: 5     Tests     Left Shoulder   Negative lift-off.     Right Shoulder   Positive empty  can, Hawkin's, Neer's and painful arc.   Negative crossover, lift-off and passive horizontal adduction.       See Exercise, Manual, and Modality Logs for complete treatment.     Assessment & Plan       Assessment  Impairments: abnormal muscle tone, abnormal or restricted ROM, activity intolerance, impaired physical strength, lacks appropriate home exercise program and pain with function   Functional limitations: carrying objects, lifting, sleeping, uncomfortable because of pain and reaching overhead   Assessment details: The patient is a 54 y.o. female who presents to physical therapy today for R shoulder pain. Upon initial evaluation, the patient demonstrates the following impairments: R shoulder strength and ROM deficits, tenderness and tightness in right upper quarter and neck. Due to these impairments, the patient is unable to sleep, lift or reach OH without pain. The patient would benefit from skilled PT services to address functional limitations and impairments and to improve patient quality of life.        Goals  Plan Goals: LTG 1: 12 weeks:  The patient will demonstrate 80 degrees of shoulder external rotation to allow the patient to reach into upper kitchen cabinets and manipulate clothing behind the back with greater ease.    STATUS:  New  STG 1a: 6 weeks:  The patient will demonstrate 70 degrees of R shoulder external rotation..    STATUS:  New    LTG 2: 12 weeks:  The patient will demonstrate 5/5 strength for R shoulder flexion, abduction, external rotation, and internal rotation in order to demonstrate improved shoulder stability.    STATUS:  New  STG 2a: 8 weeks:  The patient will demonstrate 4+/5 strength for R shoulder flexion, abduction, external rotation, and internal rotation.    STATUS:  New  STG2b:  2 weeks:  The patient will be independent with home exercises.     STATUS:  New     LTG 3: 12 weeks:  The patient will report a pain rating of 1/10 or better in order to improve sleep quality and  tolerance to performance of activities of daily living.    STATUS:  New  STG 3a: 8 weeks:  The patient will report a pain rating of 3/10 or better.     STATUS:  New        Plan  Therapy options: will be seen for skilled therapy services  Planned modality interventions: dry needling, TENS, thermotherapy (hydrocollator packs), ultrasound, traction and high voltage pulsed current (pain management)  Planned therapy interventions: body mechanics training, flexibility, functional ROM exercises, home exercise program, joint mobilization, manual therapy, neuromuscular re-education, postural training, soft tissue mobilization, strengthening, stretching and therapeutic activities  Frequency: 2x week  Duration in weeks: 12  Treatment plan discussed with: patient        Visit Diagnoses:    ICD-10-CM ICD-9-CM   1. Chronic right shoulder pain  M25.511 719.41    G89.29 338.29       History # of Personal Factors and/or Comorbidities: MODERATE (1-2)  Examination of Body System(s): # of elements: LOW (1-2)  Clinical Presentation: STABLE   Clinical Decision Making: LOW       Timed:         Manual Therapy:         mins  84817;     Therapeutic Exercise:    8     mins  07100;     Neuromuscular Abdiel:        mins  08332;    Therapeutic Activity:          mins  39375;     Gait Training:           mins  31391;     Ultrasound:          mins  56319;    Ionto                                   mins   11178  Self Care                       8     mins   91075  Canalith Repos         mins 55739      Un-Timed:  Electrical Stimulation:         mins  75127 ( );  Dry Needling          mins self-pay  Traction          mins 95625  Low Eval     30     Mins  95603  Mod Eval          Mins  08813  High Eval                            Mins  14623  Re-Eval                               mins  21598    Timed Treatment:   16   mins   Total Treatment:     46   mins    PT SIGNATURE: Varsha Fall PT         Initial Certification  Certification Period:  5/29/2024 thru 8/27/2024  I certify that the therapy services are furnished while this patient is under my care.  The services outlined above are required by this patient, and will be reviewed every 90 days.     PHYSICIAN: Julianne Glez MD      DATE:     Please sign and return via fax to [phone].. Thank you, [facility].

## 2024-06-13 ENCOUNTER — TREATMENT (OUTPATIENT)
Dept: PHYSICAL THERAPY | Facility: CLINIC | Age: 54
End: 2024-06-13
Payer: COMMERCIAL

## 2024-06-13 DIAGNOSIS — M25.511 CHRONIC RIGHT SHOULDER PAIN: Primary | ICD-10-CM

## 2024-06-13 DIAGNOSIS — G89.29 CHRONIC RIGHT SHOULDER PAIN: Primary | ICD-10-CM

## 2024-06-13 NOTE — PROGRESS NOTES
Physical Therapy Daily Treatment Note  [address] [facility], [address] [place] [address]     Patient: Radha Tobias   : 1970  Referring practitioner: Julianne Glez MD  Diagnosis:      ICD-10-CM ICD-9-CM   1. Chronic right shoulder pain  M25.511 719.41    G89.29 338.29     Date of Initial Visit: Type: THERAPY  Noted: 2024  Today's Date: 2024    VISIT#: 2          Subjective   Radha Tobias reports: 510 pain level in L lateral shoulder today.     Objective   See Exercise, Manual, and Modality Logs for complete treatment.       Assessment & Plan       Assessment  Assessment details: Reviewed HEP and progressed with R shoulder ROM exercises and manual to decrease pain and improve movement quality.  Dry needling brochure provided with information about possible benefits.           Progress per Plan of Care and Progress strengthening /stabilization /functional activity           Timed:  Manual Therapy:    8     mins  45167;  Therapeutic Exercise:    10     mins  53299;     Neuromuscular Abdiel:    10    mins  43032;    Therapeutic Activity:          mins  88622;     Gait Training:           mins  19048;     Ultrasound:          mins  56088;    Electrical Stimulation:         mins  48810 ( );    Untimed:  Electrical Stimulation:         mins  38848 ( );  Mechanical Traction:         mins  41442;   Dry needling:       Self pay    Timed Treatment:   28   mins   Total Treatment:     28   mins  Varsha Fall PT, DPT, CLT, CIDN  Physical Therapist

## 2024-06-20 ENCOUNTER — TREATMENT (OUTPATIENT)
Dept: PHYSICAL THERAPY | Facility: CLINIC | Age: 54
End: 2024-06-20
Payer: COMMERCIAL

## 2024-06-20 DIAGNOSIS — M25.511 CHRONIC RIGHT SHOULDER PAIN: Primary | ICD-10-CM

## 2024-06-20 DIAGNOSIS — G89.29 CHRONIC RIGHT SHOULDER PAIN: Primary | ICD-10-CM

## 2024-06-20 NOTE — PROGRESS NOTES
Physical Therapy Daily Treatment Note   [facility], [address] [place] [address]     Patient: Radha Tobias   : 1970  Referring practitioner: Julianne Glez MD  Diagnosis:      ICD-10-CM ICD-9-CM   1. Chronic right shoulder pain  M25.511 719.41    G89.29 338.29     Date of Initial Visit: Type: THERAPY  Noted: 2024  Today's Date: 2024    VISIT#: 3          Subjective   Radha Tobias reports: 4/10 pain level in L shoulder today.     Objective   See Exercise, Manual, and Modality Logs for complete treatment.       Assessment & Plan       Assessment  Assessment details: Pt had decreased pain with motion after exercises today.  Estim and MH added at end of session for further pain relief.           Progress per Plan of Care and Progress strengthening /stabilization /functional activity           Timed:  Manual Therapy:         mins  34734;  Therapeutic Exercise:    10     mins  60258;     Neuromuscular Abdiel:    10    mins  29602;    Therapeutic Activity:     10     mins  34098;     Gait Training:           mins  89417;     Ultrasound:          mins  46073;    Electrical Stimulation:         mins  36431 ( );    Untimed:  Electrical Stimulation:     20    mins  58498 ( );  Mechanical Traction:         mins  17914;   Dry needling:       Self pay    Timed Treatment:   30    mins   Total Treatment:     50   mins  Varsha Fall PT, DPT, CLT, CIDN  Physical Therapist

## 2024-06-27 ENCOUNTER — TREATMENT (OUTPATIENT)
Dept: PHYSICAL THERAPY | Facility: CLINIC | Age: 54
End: 2024-06-27
Payer: COMMERCIAL

## 2024-06-27 DIAGNOSIS — M25.511 CHRONIC RIGHT SHOULDER PAIN: Primary | ICD-10-CM

## 2024-06-27 DIAGNOSIS — G89.29 CHRONIC RIGHT SHOULDER PAIN: Primary | ICD-10-CM

## 2024-06-27 NOTE — PROGRESS NOTES
Physical Therapy Daily Treatment Note  [address] [facility], [address] [place] [address]     Patient: Radha Tobias   : 1970  Referring practitioner: Julianne Glez MD  Diagnosis:      ICD-10-CM ICD-9-CM   1. Chronic right shoulder pain  M25.511 719.41    G89.29 338.29     Date of Initial Visit: Type: THERAPY  Noted: 2024  Today's Date: 2024    VISIT#: 4          Subjective   Radha Tobias reports: she is sore today in her right anterolateral shoulder after lifting cases of water 2 days ago.      Objective   See Exercise, Manual, and Modality Logs for complete treatment.       Assessment & Plan       Assessment  Assessment details: Ultrasound performed to decrease soft tissue tightness and pain.           Progress per Plan of Care and Progress strengthening /stabilization /functional activity           Timed:  Manual Therapy:    8     mins  59337;  Therapeutic Exercise:         mins  47641;     Neuromuscular Abdiel:    8    mins  97568;    Therapeutic Activity:     8     mins  11556;     Gait Training:           mins  74724;     Ultrasound:     10     mins  79953;    Electrical Stimulation:         mins  40276 ( );    Untimed:  Electrical Stimulation:         mins  21665 ( );  Mechanical Traction:         mins  58228;   Dry needling:       Self pay    Timed Treatment:   34   mins   Total Treatment:     34   mins  Varsha Fall PT, DPT, CLT, CIDN  Physical Therapist

## 2024-07-03 ENCOUNTER — TREATMENT (OUTPATIENT)
Dept: PHYSICAL THERAPY | Facility: CLINIC | Age: 54
End: 2024-07-03
Payer: COMMERCIAL

## 2024-07-03 DIAGNOSIS — M25.511 CHRONIC RIGHT SHOULDER PAIN: Primary | ICD-10-CM

## 2024-07-03 DIAGNOSIS — G89.29 CHRONIC RIGHT SHOULDER PAIN: Primary | ICD-10-CM

## 2024-07-03 NOTE — PROGRESS NOTES
Physical Therapy Daily Treatment Note  [address] [facility], [address] [place] [address]     Patient: Radha Tobias   : 1970  Referring practitioner: Julianne Glez MD  Diagnosis:      ICD-10-CM ICD-9-CM   1. Chronic right shoulder pain  M25.511 719.41    G89.29 338.29     Date of Initial Visit: Type: THERAPY  Noted: 2024  Today's Date: 7/3/2024    VISIT#: 5          Subjective   Radha Tobias reports: 4/10 pain level today in right shoulder.     Objective   See Exercise, Manual, and Modality Logs for complete treatment.       Assessment & Plan       Assessment  Assessment details: US continues to provide pt with relief and decreased symptoms.            Progress per Plan of Care and Progress strengthening /stabilization /functional activity           Timed:  Manual Therapy:    8     mins  68589;  Therapeutic Exercise:     8    mins  05615;     Neuromuscular Abdiel:    10    mins  94770;    Therapeutic Activity:          mins  99839;     Gait Training:           mins  11183;     Ultrasound:     10     mins  77774;    Electrical Stimulation:         mins  81652 ( );    Untimed:  Electrical Stimulation:         mins  89686 ( );  Mechanical Traction:         mins  06573;   Dry needling:       Self pay    Timed Treatment:   36   mins   Total Treatment:     36   mins  Varsha Fall PT, DPT, CLT, CIDN  Physical Therapist

## 2024-07-11 ENCOUNTER — TREATMENT (OUTPATIENT)
Dept: PHYSICAL THERAPY | Facility: CLINIC | Age: 54
End: 2024-07-11
Payer: COMMERCIAL

## 2024-07-11 DIAGNOSIS — M25.511 CHRONIC RIGHT SHOULDER PAIN: Primary | ICD-10-CM

## 2024-07-11 DIAGNOSIS — G89.29 CHRONIC RIGHT SHOULDER PAIN: Primary | ICD-10-CM

## 2024-07-11 NOTE — PROGRESS NOTES
Physical Therapy Daily Treatment Note  [address] [facility], [address] [place] [address]     Patient: Radha Tobias   : 1970  Referring practitioner: Julianne Glez MD  Diagnosis:      ICD-10-CM ICD-9-CM   1. Chronic right shoulder pain  M25.511 719.41    G89.29 338.29     Date of Initial Visit: Type: THERAPY  Noted: 2024  Today's Date: 2024    VISIT#: 6          Subjective   Radha Tobias reports: 4/10 pain level today and that she has noticed improved motion and decreased pain in the past week.      Objective   See Exercise, Manual, and Modality Logs for complete treatment.       Assessment & Plan       Assessment  Assessment details: Pt continues to get relief with ultrasound and exercises.  R shoulder abduction continues to be the most aggravating movement.            Progress per Plan of Care and Progress strengthening /stabilization /functional activity           Timed:  Manual Therapy:    8     mins  04225;  Therapeutic Exercise:    8     mins  86661;     Neuromuscular Abdiel:    8    mins  75175;    Therapeutic Activity:     8     mins  75751;     Gait Training:           mins  27574;     Ultrasound:     10     mins  42494;    Electrical Stimulation:         mins  41414 ( );    Untimed:  Electrical Stimulation:         mins  05242 ( );  Mechanical Traction:         mins  61366;   Dry needling:       Self pay    Timed Treatment:   42   mins   Total Treatment:     42   mins  Varsha Fall PT, DPT, CLT, CIDN  Physical Therapist

## 2024-07-18 ENCOUNTER — TREATMENT (OUTPATIENT)
Dept: PHYSICAL THERAPY | Facility: CLINIC | Age: 54
End: 2024-07-18
Payer: COMMERCIAL

## 2024-07-18 DIAGNOSIS — M25.511 CHRONIC RIGHT SHOULDER PAIN: Primary | ICD-10-CM

## 2024-07-18 DIAGNOSIS — G89.29 CHRONIC RIGHT SHOULDER PAIN: Primary | ICD-10-CM

## 2024-07-18 NOTE — PROGRESS NOTES
Physical Therapy Daily Treatment Note  [address] [facility], [address] [place] [address]     Patient: Radha Tobias   : 1970  Referring practitioner: Julianne Glez MD  Diagnosis:      ICD-10-CM ICD-9-CM   1. Chronic right shoulder pain  M25.511 719.41    G89.29 338.29     Date of Initial Visit: Type: THERAPY  Noted: 2024  Today's Date: 2024    VISIT#: 7          Subjective   Radha Tobias reports: 310 pain level in the R shoulder today.     Objective   See Exercise, Manual, and Modality Logs for complete treatment.       Assessment & Plan       Assessment  Assessment details: Pt continues to get relief with ultrasound and manual and exercises.           Progress per Plan of Care and Progress strengthening /stabilization /functional activity           Timed:  Manual Therapy:    8     mins  35449;  Therapeutic Exercise:    8     mins  86487;     Neuromuscular Abdiel:    8    mins  59241;    Therapeutic Activity:     8     mins  92317;     Gait Training:           mins  73481;     Ultrasound:     10     mins  01327;    Electrical Stimulation:         mins  84042 ( );    Untimed:  Electrical Stimulation:         mins  80190 ( );  Mechanical Traction:         mins  36443;   Dry needling:       Self pay    Timed Treatment:   42   mins   Total Treatment:     42   mins  Varsha Fall PT, DPT, CLT, CIDN  Physical Therapist

## 2024-07-25 ENCOUNTER — TREATMENT (OUTPATIENT)
Dept: PHYSICAL THERAPY | Facility: CLINIC | Age: 54
End: 2024-07-25
Payer: COMMERCIAL

## 2024-07-25 DIAGNOSIS — M25.511 CHRONIC RIGHT SHOULDER PAIN: Primary | ICD-10-CM

## 2024-07-25 DIAGNOSIS — G89.29 CHRONIC RIGHT SHOULDER PAIN: Primary | ICD-10-CM

## 2024-07-25 NOTE — PROGRESS NOTES
Physical Therapy Daily Treatment Note  [address] [facility], [address] [place] [address]     Patient: Radha Tobias   : 1970  Referring practitioner: Julianne Glez MD  Diagnosis:      ICD-10-CM ICD-9-CM   1. Chronic right shoulder pain  M25.511 719.41    G89.29 338.29     Date of Initial Visit: Type: THERAPY  Noted: 2024  Today's Date: 2024    VISIT#: 8          Subjective   Radha Tobias reports: some soreness from pulling herself up into the RV.  She states her pain level is 5/10 today.      Objective   See Exercise, Manual, and Modality Logs for complete treatment.       Assessment & Plan       Assessment  Assessment details: Pt had some pain and hypertonicity in the right biceps today with ultrasound and manual continuing to bring relief.            Progress per Plan of Care and Progress strengthening /stabilization /functional activity           Timed:  Manual Therapy:    8     mins  21898;  Therapeutic Exercise:         mins  17890;     Neuromuscular Abdiel:    10    mins  22089;    Therapeutic Activity:      8    mins  07501;     Gait Training:           mins  86294;     Ultrasound:     10     mins  68904;    Electrical Stimulation:         mins  31252 ( );    Untimed:  Electrical Stimulation:         mins  21847 ( );  Mechanical Traction:         mins  61952;   Dry needling:       Self pay    Timed Treatment:   36   mins   Total Treatment:     36   mins  Varsha Fall PT, DPT, CLT, CIDN  Physical Therapist

## 2024-07-29 NOTE — PROGRESS NOTES
Subjective   Radha Tobias is a 54 y.o. female. Presents to [facility]    Chief Complaint   Patient presents with    Shoulder Pain    Hypertension       Arm Pain   There was no injury mechanism (pain started a few months ago). The pain is present in the right shoulder. The quality of the pain is described as aching. The pain radiates to the right arm. The pain is at a severity of 4/10. The pain is mild. Pertinent negatives include no muscle weakness, numbness or tingling. The symptoms are aggravated by movement and lifting. She has tried NSAIDs, heat, ice and rest (PT) for the symptoms. The treatment provided mild relief.      She has had 6-8 visits with PT. She is still in PT but her therapist is not seeing improvement and recommended a MRI    I personally reviewed and updated the patient's allergies, medications, problem list, and past medical, surgical, social, and family history. I have reviewed and confirmed the accuracy of the History of Present Illness and Review of Symptoms as documented by the MA/LPN/RN. Julianne Glez MD    Allergies:  Allergies   Allergen Reactions    Sulfa Antibiotics Palpitations       Social History:  Social History     Socioeconomic History    Marital status:    Tobacco Use    Smoking status: Former     Current packs/day: 0.00     Average packs/day: 0.5 packs/day for 21.9 years (10.9 ttl pk-yrs)     Types: Cigarettes     Start date:      Quit date: 2009     Years since quittin.7     Passive exposure: Past    Smokeless tobacco: Never   Vaping Use    Vaping status: Never Used   Substance and Sexual Activity    Alcohol use: Yes     Alcohol/week: 2.0 standard drinks of alcohol     Types: 1 Glasses of wine, 1 Shots of liquor per week     Comment: weekly    Drug use: No    Sexual activity: Yes       Family History:  History reviewed. No pertinent family history.    Past Medical History :  Patient Active Problem List   Diagnosis    Essential  hypertension    Easy bruising    Mild episode of recurrent major depressive disorder    Anxiety    H/O total vaginal hysterectomy    GERD (gastroesophageal reflux disease)    Pituitary adenoma    Low libido    Pure hypercholesterolemia    Pain of right scapula    Rib pain on right side    Pityriasis rosea    Class 1 obesity due to excess calories with body mass index (BMI) of 31.0 to 31.9 in adult    Borderline diabetes    Colon cancer screening    Chronic rhinitis    Chronic right shoulder pain       Medication List:    Current Outpatient Medications:     escitalopram (LEXAPRO) 10 MG tablet, Take 1 tablet by mouth Daily., Disp: 90 tablet, Rfl: 3    famotidine (PEPCID) 20 MG tablet, Take 1 tablet by mouth 2 (Two) Times a Day., Disp: 60 tablet, Rfl: 5    fexofenadine (ALLEGRA) 180 MG tablet, Take 1 tablet by mouth Daily., Disp: 30 tablet, Rfl: 12    fluticasone (FLONASE) 50 MCG/ACT nasal spray, 1 spray into the nostril(s) as directed by provider Daily., Disp: 16 g, Rfl: 12    hydrOXYzine (ATARAX) 25 MG tablet, Take 1 tablet by mouth 3 (Three) Times a Day As Needed for Anxiety. (Patient taking differently: Take 1 tablet by mouth 3 (Three) Times a Day As Needed for Anxiety. As needed), Disp: 90 tablet, Rfl: 0    lisinopril-hydrochlorothiazide (PRINZIDE,ZESTORETIC) 20-12.5 MG per tablet, TAKE 1 TABLET BY MOUTH DAILY, Disp: 30 tablet, Rfl: 12    polyethylene glycol (MiraLax) 17 GM/SCOOP powder, Take as directed per instructions for bowel prep, Disp: 238 g, Rfl: 0    rosuvastatin (CRESTOR) 5 MG tablet, Take 1 tablet by mouth Daily., Disp: 30 tablet, Rfl: 12    tiZANidine (ZANAFLEX) 4 MG tablet, Take 1 tablet by mouth At Night As Needed for Muscle Spasms., Disp: 30 tablet, Rfl: 0    Past Surgical History:  Past Surgical History:   Procedure Laterality Date    APPENDECTOMY      COLONOSCOPY N/A 4/8/2024    Procedure: COLONOSCOPY with polypectomy;  Surgeon: Kelton Jean MD;  Location: [facility] ENDOSCOPY;  Service:  "Gastroenterology;  Laterality: N/A;  diverticulosis, colon polyps    HYSTERECTOMY      for fibroids, not cancer    TUBAL ABDOMINAL LIGATION           Physical Exam:      Vital Signs:    Vitals:    08/05/24 0757   BP: 126/82   Pulse: 91   Resp: 18   Temp: 97.1 °F (36.2 °C)   SpO2: 98%        /82 (BP Location: Right arm, Patient Position: Sitting, Cuff Size: Adult)   Pulse 91   Temp 97.1 °F (36.2 °C) (Temporal)   Resp 18   Ht 167.6 cm (66\")   Wt 90.9 kg (200 lb 6.4 oz)   LMP  (LMP Unknown)   SpO2 98% Comment: ra  BMI 32.35 kg/m²     Wt Readings from Last 3 Encounters:   08/05/24 90.9 kg (200 lb 6.4 oz)   05/06/24 87.3 kg (192 lb 6.4 oz)   04/08/24 83.5 kg (184 lb)       Result Review :                Physical Exam  Vitals reviewed.   Constitutional:       Appearance: Normal appearance. She is well-developed.   HENT:      Head: Normocephalic and atraumatic.   Eyes:      General:         Right eye: No discharge.         Left eye: No discharge.   Cardiovascular:      Rate and Rhythm: Normal rate and regular rhythm.      Heart sounds: Normal heart sounds. No murmur heard.     No friction rub. No gallop.   Pulmonary:      Effort: Pulmonary effort is normal. No respiratory distress.      Breath sounds: Normal breath sounds. No wheezing or rales.   Musculoskeletal:      Right shoulder: Tenderness and crepitus present. No swelling. Decreased range of motion.      Comments: Not able to reach overhead, pain with crank   Skin:     General: Skin is warm and dry.      Findings: No rash.   Neurological:      Mental Status: She is alert and oriented to person, place, and time.      Coordination: Coordination normal.      Gait: Gait normal.   Psychiatric:         Behavior: Behavior is cooperative.         Assessment and Plan:  Problems Addressed this Visit          Cardiac and Vasculature    Essential hypertension     Hypertension is stable and controlled  Continue current treatment regimen.  Dietary sodium " restriction.  Blood pressure will be reassessed in 3 months.         Pure hypercholesterolemia      She is on crestor  Will check labs  Continue current treatment         Relevant Orders    Comprehensive Metabolic Panel    Lipid Panel With / Chol / HDL Ratio       Endocrine and Metabolic    Class 1 obesity due to excess calories with body mass index (BMI) of 31.0 to 31.9 in adult - Primary    Borderline diabetes    Relevant Orders    Hemoglobin A1c       Musculoskeletal and Injuries    Chronic right shoulder pain     She has been going to PT for several months. She has been on nsaids and muscle relaxer. She has tried home exercises. She has had a nondiagnostic Xray.   Will send her to see an orthopedist.          Relevant Orders    Ambulatory Referral to Orthopedic Surgery (Completed)    MRI Shoulder Right Without Contrast     Diagnoses         Codes Comments    Class 1 obesity due to excess calories with serious comorbidity and body mass index (BMI) of 32.0 to 32.9 in adult    -  Primary ICD-10-CM: E66.09, Z68.32  ICD-9-CM: 278.00, V85.32     Chronic right shoulder pain     ICD-10-CM: M25.511, G89.29  ICD-9-CM: 719.41, 338.29     Pure hypercholesterolemia     ICD-10-CM: E78.00  ICD-9-CM: 272.0     Essential hypertension     ICD-10-CM: I10  ICD-9-CM: 401.9     Borderline diabetes     ICD-10-CM: R73.03  ICD-9-CM: 790.29                          An After Visit Summary and PPPS were given to the patient.

## 2024-07-31 ENCOUNTER — TREATMENT (OUTPATIENT)
Dept: PHYSICAL THERAPY | Facility: CLINIC | Age: 54
End: 2024-07-31
Payer: COMMERCIAL

## 2024-07-31 DIAGNOSIS — M25.511 CHRONIC RIGHT SHOULDER PAIN: Primary | ICD-10-CM

## 2024-07-31 DIAGNOSIS — I10 ESSENTIAL HYPERTENSION: ICD-10-CM

## 2024-07-31 DIAGNOSIS — G89.29 CHRONIC RIGHT SHOULDER PAIN: Primary | ICD-10-CM

## 2024-07-31 RX ORDER — LISINOPRIL AND HYDROCHLOROTHIAZIDE 20; 12.5 MG/1; MG/1
1 TABLET ORAL DAILY
Qty: 30 TABLET | Refills: 12 | Status: CANCELLED | OUTPATIENT
Start: 2024-07-31

## 2024-07-31 NOTE — PROGRESS NOTES
Physical Therapy Daily Treatment Note  [address], [address] [place] [address]     Patient: Radha Tobias   : 1970  Referring practitioner: Julianne Glez MD  Diagnosis:      ICD-10-CM ICD-9-CM   1. Chronic right shoulder pain  M25.511 719.41    G89.29 338.29     Date of Initial Visit: Type: THERAPY  Noted: 2024  Today's Date: 2024    VISIT#: 9          Subjective   Radha Tobias reports: 5/10 pain level in the right shoulder today.     Objective   See Exercise, Manual, and Modality Logs for complete treatment.       Assessment & Plan       Assessment  Assessment details: Pt noted decreased pain (5/10 to 3/10) with abduction after ultrasound and manual today.           Progress per Plan of Care and Progress strengthening /stabilization /functional activity           Timed:  Manual Therapy:    8     mins  78448;  Therapeutic Exercise:    10     mins  13180;     Neuromuscular Abdiel:    10    mins  58703;    Therapeutic Activity:          mins  50687;     Gait Training:           mins  36890;     Ultrasound:     10     mins  54033;    Electrical Stimulation:         mins  08455 ( );    Untimed:  Electrical Stimulation:         mins  32257 ( );  Mechanical Traction:         mins  57977;   Dry needling:       Self pay    Timed Treatment:   38   mins   Total Treatment:     38   mins  Varsha Fall PT, DPT, CLT, CIDN  Physical Therapist

## 2024-08-02 RX ORDER — LISINOPRIL AND HYDROCHLOROTHIAZIDE 20; 12.5 MG/1; MG/1
1 TABLET ORAL DAILY
Qty: 30 TABLET | Refills: 12 | Status: SHIPPED | OUTPATIENT
Start: 2024-08-02

## 2024-08-05 ENCOUNTER — OFFICE VISIT (OUTPATIENT)
Dept: FAMILY MEDICINE CLINIC | Facility: CLINIC | Age: 54
End: 2024-08-05
Payer: COMMERCIAL

## 2024-08-05 VITALS
HEART RATE: 91 BPM | RESPIRATION RATE: 18 BRPM | WEIGHT: 200.4 LBS | HEIGHT: 66 IN | SYSTOLIC BLOOD PRESSURE: 126 MMHG | DIASTOLIC BLOOD PRESSURE: 82 MMHG | TEMPERATURE: 97.1 F | BODY MASS INDEX: 32.21 KG/M2 | OXYGEN SATURATION: 98 %

## 2024-08-05 DIAGNOSIS — E66.09 CLASS 1 OBESITY DUE TO EXCESS CALORIES WITH SERIOUS COMORBIDITY AND BODY MASS INDEX (BMI) OF 32.0 TO 32.9 IN ADULT: Primary | ICD-10-CM

## 2024-08-05 DIAGNOSIS — I10 ESSENTIAL HYPERTENSION: ICD-10-CM

## 2024-08-05 DIAGNOSIS — R73.03 BORDERLINE DIABETES: ICD-10-CM

## 2024-08-05 DIAGNOSIS — G89.29 CHRONIC RIGHT SHOULDER PAIN: ICD-10-CM

## 2024-08-05 DIAGNOSIS — E78.00 PURE HYPERCHOLESTEROLEMIA: ICD-10-CM

## 2024-08-05 DIAGNOSIS — M25.511 CHRONIC RIGHT SHOULDER PAIN: ICD-10-CM

## 2024-08-05 PROCEDURE — 99214 OFFICE O/P EST MOD 30 MIN: CPT | Performed by: FAMILY MEDICINE

## 2024-08-05 NOTE — ASSESSMENT & PLAN NOTE
She has been going to PT for several months. She has been on nsaids and muscle relaxer. She has tried home exercises. She has had a nondiagnostic Xray.   Will send her to see an orthopedist.

## 2024-08-08 ENCOUNTER — LAB (OUTPATIENT)
Dept: LAB | Facility: HOSPITAL | Age: 54
End: 2024-08-08
Payer: COMMERCIAL

## 2024-08-08 LAB
ALBUMIN SERPL-MCNC: 4.7 G/DL (ref 3.5–5.2)
ALBUMIN/GLOB SERPL: 1.7 G/DL
ALP SERPL-CCNC: 104 U/L (ref 39–117)
ALT SERPL W P-5'-P-CCNC: 19 U/L (ref 1–33)
ANION GAP SERPL CALCULATED.3IONS-SCNC: 11 MMOL/L (ref 5–15)
AST SERPL-CCNC: 23 U/L (ref 1–32)
BILIRUB SERPL-MCNC: 0.3 MG/DL (ref 0–1.2)
BUN SERPL-MCNC: 15 MG/DL (ref 6–20)
BUN/CREAT SERPL: 18.8 (ref 7–25)
CALCIUM SPEC-SCNC: 9.8 MG/DL (ref 8.6–10.5)
CHLORIDE SERPL-SCNC: 101 MMOL/L (ref 98–107)
CHOLEST SERPL-MCNC: 191 MG/DL (ref 0–200)
CO2 SERPL-SCNC: 26 MMOL/L (ref 22–29)
CREAT SERPL-MCNC: 0.8 MG/DL (ref 0.57–1)
EGFRCR SERPLBLD CKD-EPI 2021: 88.2 ML/MIN/1.73
GLOBULIN UR ELPH-MCNC: 2.8 GM/DL
GLUCOSE SERPL-MCNC: 103 MG/DL (ref 65–99)
HBA1C MFR BLD: 5.6 % (ref 4.8–5.6)
HDLC SERPL QL: 2.55
HDLC SERPL-MCNC: 75 MG/DL (ref 40–60)
LDLC SERPL CALC-MCNC: 89 MG/DL (ref 0–100)
POTASSIUM SERPL-SCNC: 4.7 MMOL/L (ref 3.5–5.2)
PROT SERPL-MCNC: 7.5 G/DL (ref 6–8.5)
SODIUM SERPL-SCNC: 138 MMOL/L (ref 136–145)
TRIGL SERPL-MCNC: 159 MG/DL (ref 0–150)
VLDLC SERPL-MCNC: 27 MG/DL (ref 5–40)

## 2024-08-08 PROCEDURE — 83036 HEMOGLOBIN GLYCOSYLATED A1C: CPT | Performed by: FAMILY MEDICINE

## 2024-08-08 PROCEDURE — 80061 LIPID PANEL: CPT | Performed by: FAMILY MEDICINE

## 2024-08-08 PROCEDURE — 36415 COLL VENOUS BLD VENIPUNCTURE: CPT | Performed by: FAMILY MEDICINE

## 2024-08-08 PROCEDURE — 80053 COMPREHEN METABOLIC PANEL: CPT | Performed by: FAMILY MEDICINE

## 2024-08-14 ENCOUNTER — TREATMENT (OUTPATIENT)
Dept: PHYSICAL THERAPY | Facility: CLINIC | Age: 54
End: 2024-08-14
Payer: COMMERCIAL

## 2024-08-14 DIAGNOSIS — G89.29 CHRONIC RIGHT SHOULDER PAIN: Primary | ICD-10-CM

## 2024-08-14 DIAGNOSIS — M25.511 CHRONIC RIGHT SHOULDER PAIN: Primary | ICD-10-CM

## 2024-08-14 NOTE — PROGRESS NOTES
Physical Therapy Daily Treatment Note  [address], [address] [place] [address]     Patient: Radha Tobias   : 1970  Referring practitioner: Julianne Glez MD  Diagnosis:      ICD-10-CM ICD-9-CM   1. Chronic right shoulder pain  M25.511 719.41    G89.29 338.29     Date of Initial Visit: Type: THERAPY  Noted: 2024  Today's Date: 2024    VISIT#: 10          Subjective   Radha Tobias reports: 4/10 pain level in the right shoulder today.     Objective   See Exercise, Manual, and Modality Logs for complete treatment.       Assessment & Plan       Assessment  Assessment details: Pt left with decreased pain level of 2/10 in the right shoulder today.           Progress per Plan of Care and Progress strengthening /stabilization /functional activity           Timed:  Manual Therapy:    8     mins  13049;  Therapeutic Exercise:    8     mins  84170;     Neuromuscular Abdiel:    8    mins  19634;    Therapeutic Activity:          mins  15141;     Gait Training:           mins  30510;     Ultrasound:     10     mins  99989;    Electrical Stimulation:         mins  26697 ( );    Untimed:  Electrical Stimulation:         mins  19552 ( );  Mechanical Traction:         mins  98886;   Dry needling:       Self pay    Timed Treatment:   34   mins   Total Treatment:     34   mins  Varsha Fall PT, DPT, CLT, CIDN  Physical Therapist

## 2024-08-21 ENCOUNTER — TREATMENT (OUTPATIENT)
Dept: PHYSICAL THERAPY | Facility: CLINIC | Age: 54
End: 2024-08-21
Payer: COMMERCIAL

## 2024-08-21 DIAGNOSIS — M25.511 CHRONIC RIGHT SHOULDER PAIN: Primary | ICD-10-CM

## 2024-08-21 DIAGNOSIS — G89.29 CHRONIC RIGHT SHOULDER PAIN: Primary | ICD-10-CM

## 2024-08-21 NOTE — PROGRESS NOTES
Physical Therapy Daily Treatment Note/Discharge Note  [address], [address] [place] [address]     Patient: Radha Tobias   : 1970  Referring practitioner: Julianne Glez MD  Diagnosis:      ICD-10-CM ICD-9-CM   1. Chronic right shoulder pain  M25.511 719.41    G89.29 338.29     Date of Initial Visit: Type: THERAPY  Noted: 2024  Today's Date: 2024    VISIT#: 11          Subjective Evaluation    Pain  Current pain ratin  At best pain rating: 3  At worst pain ratin  Quality: dull ache, cramping, sharp, tight, pressure, discomfort, knife-like and radiating         Radha Tobias reports: 4/10 pain level today.      Objective          Passive Range of Motion     Right Shoulder   External rotation 90°: 30 degrees with pain  Internal rotation 45°: 75 degrees   Internal rotation 90°: 84 degrees     Strength/Myotome Testing     Right Shoulder     Planes of Motion   Flexion: 4+   Abduction: 4+   External rotation at 0°: 4   External rotation at 45°: 4+     Additional Strength Details  Flexion, abduction and external rotation were painful with resistance    Tests     Right Shoulder   Positive crank, Hawkin's, painful arc and Speed's.   Negative empty can.       See Exercise, Manual, and Modality Logs for complete treatment.       Assessment & Plan       Assessment  Assessment details: Pt continues to only get temporary relief with ultrasound and physical therapy sessions and will be discharged today due to no further progress being achieved or more than 10 hours of temporary pain relief.  Her pain location has increased to more distally in the past 2 weeks.  She is 41% limited according to her score on the QuickDASH and feels 30% overall improved, noting she can sleep with decreased pain at night now and has improved mobility, but her pain overall has only slightly decreased and she is still limited with all scaption/abduction movements.  Pt has improved slightly with shoulder strength, but  still weak and painful in external rotation.  Her R shoulder external rotation PROM has decreased to 30 degrees and is painful in 90 degrees of shoulder abduction, but slightly improved in 45 degrees with 75 degrees of external rotation and pain.  She has increased popping, catching and locking in her right shoulder joint now.      Goals  Plan Goals: LTG 1: 12 weeks:  The patient will demonstrate 80 degrees of shoulder external rotation to allow the patient to reach into upper kitchen cabinets and manipulate clothing behind the back with greater ease.    STATUS: NOT MET   STG 1a: 6 weeks:  The patient will demonstrate 70 degrees of R shoulder external rotation..    STATUS:  NOT MET    LTG 2: 12 weeks:  The patient will demonstrate 5/5 strength for R shoulder flexion, abduction, external rotation, and internal rotation in order to demonstrate improved shoulder stability.    STATUS:  New  STG 2a: 8 weeks:  The patient will demonstrate 4+/5 strength for R shoulder flexion, abduction, external rotation, and internal rotation.    STATUS:  MET for all but external rotation and painful  STG2b:  2 weeks:  The patient will be independent with home exercises.     STATUS:  MET     LTG 3: 12 weeks:  The patient will report a pain rating of 1/10 or better in order to improve sleep quality and tolerance to performance of activities of daily living.    STATUS:  NOT MET  STG 3a: 8 weeks:  The patient will report a pain rating of 3/10 or better.     STATUS:  NOT MET        Plan  Therapy options: will not be seen for skilled therapy services          Other:  Pt would benefit from further imaging to assess her musculature and potential soft tissue pathology with labrum/biceps mm           Timed:  Manual Therapy:         mins  71435;  Therapeutic Exercise:    8     mins  08653;     Neuromuscular Abdiel:    8    mins  70858;    Therapeutic Activity:          mins  49662;     Gait Training:           mins  36064;     Ultrasound:           mins  74454;    Electrical Stimulation:         mins  30350 ( );  Self-care:  8      mins  38073    Untimed:  Electrical Stimulation:         mins  39723 ( );  Mechanical Traction:         mins  18043;   Dry needling:       Self pay    Timed Treatment:   16   mins   Total Treatment:     16   mins  Varsha Fall PT, DPT, CLT, JOHNN  Physical Therapist

## 2024-09-05 ENCOUNTER — TELEPHONE (OUTPATIENT)
Dept: FAMILY MEDICINE CLINIC | Facility: CLINIC | Age: 54
End: 2024-09-05
Payer: COMMERCIAL

## 2024-09-05 NOTE — TELEPHONE ENCOUNTER
----- Message from Julianne Amaris sent at 9/4/2024  8:47 PM EDT -----  Regarding: FW: shoulder  Contact: [phone]  Everything was sent on 8/5 to the ortho office she chose. Can you call and see what is going on?  ----- Message -----  From: Shaye Dave MA  Sent: 9/4/2024   2:30 PM EDT  To: [facility]  Clinical Pool  Subject: FW: shoulder                                       ----- Message -----  From: Radha Tobias  Sent: 9/4/2024   2:06 PM EDT  To: [facility] Clinical Pool  Subject: shoulder                                         Just wondering about my referral? Haven't heard anything from them yet.

## 2024-09-05 NOTE — TELEPHONE ENCOUNTER
Patient sent a message that she has not heard from ortho regarding referral. It looks like everything was faxed over on 08/05/24 please advise what is going on with referral thank you.

## 2024-09-12 ENCOUNTER — TELEPHONE (OUTPATIENT)
Dept: FAMILY MEDICINE CLINIC | Facility: CLINIC | Age: 54
End: 2024-09-12
Payer: COMMERCIAL

## 2024-09-12 DIAGNOSIS — G89.29 CHRONIC RIGHT SHOULDER PAIN: Primary | ICD-10-CM

## 2024-09-12 DIAGNOSIS — M25.511 CHRONIC RIGHT SHOULDER PAIN: Primary | ICD-10-CM

## 2024-09-12 NOTE — TELEPHONE ENCOUNTER
----- Message from [facility] [employer] sent at 9/12/2024  8:03 AM EDT -----  Regarding: Referral   Contact: [phone]  I still haven't heard anything from Dr Avila office. I am still having issues with my shoulder.

## 2024-09-24 NOTE — PROGRESS NOTES
Subjective   Radha Tobias is a 54 y.o. female. Presents to [facility] MEDICAL [facility]    Chief Complaint   Patient presents with    Heartburn       Heartburn  She complains of abdominal pain, belching, chest pain, globus sensation and heartburn. She reports no coughing, no dysphagia, no early satiety, no nausea, no sore throat or no wheezing. This is a chronic problem. The current episode started more than 1 month ago. The problem occurs frequently. The problem has been unchanged. The heartburn duration is several minutes. The heartburn is located in the RUQ and back. The heartburn is of moderate intensity. The heartburn wakes her from sleep. The heartburn does not limit her activity. The heartburn doesn't change with position. Nothing aggravates the symptoms. Pertinent negatives include no fatigue. She has tried an antacid for the symptoms. The treatment provided mild relief.      This happened after a couple of beers and pizza    I personally reviewed and updated the patient's allergies, medications, problem list, and past medical, surgical, social, and family history. I have reviewed and confirmed the accuracy of the History of Present Illness and Review of Symptoms as documented by the MA/LPN/RN. Julianne Glez MD    Allergies:  Allergies   Allergen Reactions    Sulfa Antibiotics Palpitations       Social History:  Social History     Socioeconomic History    Marital status:    Tobacco Use    Smoking status: Former     Current packs/day: 0.00     Average packs/day: 0.5 packs/day for 21.9 years (10.9 ttl pk-yrs)     Types: Cigarettes     Start date:      Quit date: 2009     Years since quittin.8     Passive exposure: Past    Smokeless tobacco: Never   Vaping Use    Vaping status: Never Used   Substance and Sexual Activity    Alcohol use: Yes     Alcohol/week: 2.0 standard drinks of alcohol     Types: 1 Glasses of wine, 1 Shots of liquor per week     Comment: weekly    Drug use: No     Sexual activity: Yes       Family History:  History reviewed. No pertinent family history.    Past Medical History :  Patient Active Problem List   Diagnosis    Essential hypertension    Easy bruising    Mild episode of recurrent major depressive disorder    Anxiety    H/O total vaginal hysterectomy    GERD (gastroesophageal reflux disease)    Pituitary adenoma    Low libido    Pure hypercholesterolemia    Pain of right scapula    Rib pain on right side    Pityriasis rosea    Class 1 obesity due to excess calories with serious comorbidity and body mass index (BMI) of 32.0 to 32.9 in adult    Borderline diabetes    Colon cancer screening    Chronic rhinitis    Chronic right shoulder pain       Medication List:    Current Outpatient Medications:     escitalopram (LEXAPRO) 10 MG tablet, Take 1 tablet by mouth Daily., Disp: 90 tablet, Rfl: 3    fexofenadine (ALLEGRA) 180 MG tablet, Take 1 tablet by mouth Daily., Disp: 30 tablet, Rfl: 12    fluticasone (FLONASE) 50 MCG/ACT nasal spray, 1 spray into the nostril(s) as directed by provider Daily., Disp: 16 g, Rfl: 12    hydrOXYzine (ATARAX) 25 MG tablet, Take 1 tablet by mouth 3 (Three) Times a Day As Needed for Anxiety. (Patient taking differently: Take 1 tablet by mouth 3 (Three) Times a Day As Needed for Anxiety. As needed), Disp: 90 tablet, Rfl: 0    lisinopril-hydrochlorothiazide (PRINZIDE,ZESTORETIC) 20-12.5 MG per tablet, TAKE 1 TABLET BY MOUTH DAILY, Disp: 30 tablet, Rfl: 12    polyethylene glycol (MiraLax) 17 GM/SCOOP powder, Take as directed per instructions for bowel prep, Disp: 238 g, Rfl: 0    rosuvastatin (CRESTOR) 5 MG tablet, Take 1 tablet by mouth Daily., Disp: 30 tablet, Rfl: 12    tiZANidine (ZANAFLEX) 4 MG tablet, Take 1 tablet by mouth At Night As Needed for Muscle Spasms., Disp: 30 tablet, Rfl: 0    pantoprazole (PROTONIX) 40 MG EC tablet, Take 1 tablet by mouth Daily., Disp: 30 tablet, Rfl: 3    Past Surgical History:  Past Surgical History:  "  Procedure Laterality Date    APPENDECTOMY      COLONOSCOPY N/A 4/8/2024    Procedure: COLONOSCOPY with polypectomy;  Surgeon: Kelton Jean MD;  Location: [facility] ENDOSCOPY;  Service: Gastroenterology;  Laterality: N/A;  diverticulosis, colon polyps    HYSTERECTOMY      for fibroids, not cancer    TUBAL ABDOMINAL LIGATION           Physical Exam:      Vital Signs:    Vitals:    09/30/24 1559   BP: 118/82   Pulse: 107   Resp: 18   Temp: 97.1 °F (36.2 °C)   SpO2: 98%        /82 (BP Location: Right arm, Patient Position: Sitting, Cuff Size: Adult)   Pulse 107   Temp 97.1 °F (36.2 °C) (Temporal)   Resp 18   Ht 167.6 cm (66\")   Wt 90.6 kg (199 lb 12.8 oz)   LMP  (LMP Unknown)   SpO2 98% Comment: ra  BMI 32.25 kg/m²     Wt Readings from Last 3 Encounters:   09/30/24 90.6 kg (199 lb 12.8 oz)   08/05/24 90.9 kg (200 lb 6.4 oz)   05/06/24 87.3 kg (192 lb 6.4 oz)       Result Review :                Physical Exam  Vitals reviewed.   Constitutional:       Appearance: Normal appearance. She is well-developed.   HENT:      Head: Normocephalic and atraumatic.   Eyes:      General:         Right eye: No discharge.         Left eye: No discharge.   Cardiovascular:      Rate and Rhythm: Normal rate and regular rhythm.      Heart sounds: Normal heart sounds. No murmur heard.     No friction rub. No gallop.   Pulmonary:      Effort: Pulmonary effort is normal. No respiratory distress.      Breath sounds: Normal breath sounds. No wheezing or rales.   Abdominal:      General: Abdomen is flat. Bowel sounds are normal. There is no distension.      Palpations: Abdomen is soft. There is no mass.      Tenderness: There is no abdominal tenderness. There is no guarding or rebound.      Hernia: No hernia is present.   Skin:     General: Skin is warm and dry.      Findings: No rash.   Neurological:      Mental Status: She is alert and oriented to person, place, and time.      Coordination: Coordination normal.      Gait: Gait " normal.   Psychiatric:         Behavior: Behavior is cooperative.         Assessment and Plan:  Problems Addressed this Visit          Endocrine and Metabolic    Class 1 obesity due to excess calories with serious comorbidity and body mass index (BMI) of 32.0 to 32.9 in adult       Gastrointestinal Abdominal     GERD (gastroesophageal reflux disease) - Primary     Worsening    Start protonix    Limit tobacco, alcohol, caffeine, chocalate, citrus fruits, recumbency after meals and large portions. Discussed link between PPI's and increased risk of hip, wrist, and spine fractures           Relevant Medications    pantoprazole (PROTONIX) 40 MG EC tablet    Other Relevant Orders    Ambulatory Referral to Gastroenterology (Completed)     Diagnoses         Codes Comments    Gastroesophageal reflux disease, unspecified whether esophagitis present    -  Primary ICD-10-CM: K21.9  ICD-9-CM: 530.81     Class 1 obesity due to excess calories with serious comorbidity and body mass index (BMI) of 32.0 to 32.9 in adult     ICD-10-CM: E66.09, Z68.32  ICD-9-CM: 278.00, V85.32                          An After Visit Summary and PPPS were given to the patient.       This document is intended for medical expert use only. Reading of this document by patients and/or patient's family without participating medical staff guidance may result in misinterpretation and unintended morbidity. Any interpretation of such data is the responsibility of the patient and/or family member responsible for the patient in concert with their primary or specialist providers, not to be left for sources of online searches such as [employer], [employer] or similar queries. Relying on these approaches to knowledge may result in misinterpretation, misguided goals of care and even death should patients or family members try recommendations outside of the realm of professional medical care.

## 2024-09-30 ENCOUNTER — OFFICE VISIT (OUTPATIENT)
Dept: FAMILY MEDICINE CLINIC | Facility: CLINIC | Age: 54
End: 2024-09-30
Payer: COMMERCIAL

## 2024-09-30 VITALS
RESPIRATION RATE: 18 BRPM | HEART RATE: 107 BPM | DIASTOLIC BLOOD PRESSURE: 82 MMHG | TEMPERATURE: 97.1 F | WEIGHT: 199.8 LBS | BODY MASS INDEX: 32.11 KG/M2 | SYSTOLIC BLOOD PRESSURE: 118 MMHG | HEIGHT: 66 IN | OXYGEN SATURATION: 98 %

## 2024-09-30 DIAGNOSIS — E66.811 CLASS 1 OBESITY DUE TO EXCESS CALORIES WITH SERIOUS COMORBIDITY AND BODY MASS INDEX (BMI) OF 32.0 TO 32.9 IN ADULT: ICD-10-CM

## 2024-09-30 DIAGNOSIS — E66.09 CLASS 1 OBESITY DUE TO EXCESS CALORIES WITH SERIOUS COMORBIDITY AND BODY MASS INDEX (BMI) OF 32.0 TO 32.9 IN ADULT: ICD-10-CM

## 2024-09-30 DIAGNOSIS — K21.9 GASTROESOPHAGEAL REFLUX DISEASE, UNSPECIFIED WHETHER ESOPHAGITIS PRESENT: Primary | ICD-10-CM

## 2024-09-30 PROCEDURE — 99214 OFFICE O/P EST MOD 30 MIN: CPT | Performed by: FAMILY MEDICINE

## 2024-09-30 RX ORDER — PANTOPRAZOLE SODIUM 40 MG/1
40 TABLET, DELAYED RELEASE ORAL DAILY
Qty: 30 TABLET | Refills: 3 | Status: SHIPPED | OUTPATIENT
Start: 2024-09-30

## 2024-09-30 NOTE — ASSESSMENT & PLAN NOTE
Worsening    Start protonix    Limit tobacco, alcohol, caffeine, chocalate, citrus fruits, recumbency after meals and large portions. Discussed link between PPI's and increased risk of hip, wrist, and spine fractures     never intubated

## 2024-10-17 ENCOUNTER — OFFICE VISIT (OUTPATIENT)
Dept: ORTHOPEDIC SURGERY | Facility: CLINIC | Age: 54
End: 2024-10-17
Payer: COMMERCIAL

## 2024-10-17 VITALS — HEART RATE: 108 BPM | WEIGHT: 199 LBS | BODY MASS INDEX: 31.98 KG/M2 | HEIGHT: 66 IN

## 2024-10-17 DIAGNOSIS — M25.511 RIGHT SHOULDER PAIN, UNSPECIFIED CHRONICITY: Primary | ICD-10-CM

## 2024-10-17 NOTE — PATIENT INSTRUCTIONS
MRI follow-up instructions    Today at your office visit, Dr. Avila recommended an MRI (magnetic resonance imaging) to evaluate your joint pain.  This requires a precertification process, which our office will do, and then we will contact you when it is approved and go over scheduling options.  We typically recommend these to be performed at [facility] or [facility].  If for some reason it is performed elsewhere please arrange to have that facility give you a disc with your images on it so Dr. Avlia can review it at your follow-up visit.    When checking out today we recommend making an appointment to go over your results in approximately two weeks.  If your MRI is done sooner than that we would be happy to schedule you sooner to go over your results, just contact us at [phone] or through the [employer] portal to let us know your MRI is completed.  Seeing you in person for the results gives us the best opportunity to look at your images together and explain the diagnosis and treatment options to best help you.

## 2024-10-17 NOTE — PROGRESS NOTES
"     Patient ID: Radha Tobias is a 54 y.o. female.    Chief Complaint:    Chief Complaint   Patient presents with    Right Shoulder - Initial Evaluation, Pain     Pain 4        HPI:  This is a 54-year-old female here with longstanding right shoulder pain without specific trauma.  She has pain deep in the shoulder area she may have exacerbated after moving her home.  She has pain deep in the shoulder with pain at night and refers to the bicep deltoid she is taken over-the-counter medication she had 7 weeks of physical therapy all without relief.  She is here from Dr. Glez  Past Medical History:   Diagnosis Date    Anxiety     Depression     Hypertension        Past Surgical History:   Procedure Laterality Date    APPENDECTOMY      COLONOSCOPY N/A 2024    Procedure: COLONOSCOPY with polypectomy;  Surgeon: Kelton Jean MD;  Location: [facility] ENDOSCOPY;  Service: Gastroenterology;  Laterality: N/A;  diverticulosis, colon polyps    HYSTERECTOMY      for fibroids, not cancer    TUBAL ABDOMINAL LIGATION         History reviewed. No pertinent family history.       Social History     Occupational History    Not on file   Tobacco Use    Smoking status: Former     Current packs/day: 0.00     Average packs/day: 0.5 packs/day for 21.9 years (10.9 ttl pk-yrs)     Types: Cigarettes     Start date:      Quit date: 2009     Years since quittin.9     Passive exposure: Past    Smokeless tobacco: Never   Vaping Use    Vaping status: Never Used   Substance and Sexual Activity    Alcohol use: Yes     Alcohol/week: 2.0 standard drinks of alcohol     Types: 1 Glasses of wine, 1 Shots of liquor per week     Comment: weekly    Drug use: No    Sexual activity: Yes      Review of Systems   Cardiovascular:  Negative for chest pain.   Musculoskeletal:  Positive for arthralgias.       Objective:    Pulse 108   Ht 167.6 cm (66\")   Wt 90.3 kg (199 lb)   LMP  (LMP Unknown)   BMI 32.12 kg/m²     Physical " Examination:  Right shoulder demonstrates pain in the impingement area passive elevation 160 abduction 130 external rotation 40 internal rotation S1 with pain and weakness on Speed [place] supraspinatus testing.  Belly press and lift off are 4/5.  Sensory and motor exam are intact all distributions. Radial pulse is palpable and capillary refill is less than two seconds to all digits.    Imaging:  Prior x-ray demonstrates well-maintained joint spaces no impingement    Assessment:  Right shoulder pain possible cuff tear    Plan:  I recommend MRI    Disclaimer: Part of this note may be an electronic transcription/translation of spoken language to printed text using the Dragon Dictation System

## 2024-11-27 ENCOUNTER — HOSPITAL ENCOUNTER (OUTPATIENT)
Dept: MRI IMAGING | Facility: HOSPITAL | Age: 54
Discharge: HOME | End: 2024-11-27
Admitting: ORTHOPAEDIC SURGERY
Payer: COMMERCIAL

## 2024-11-27 DIAGNOSIS — M25.511 RIGHT SHOULDER PAIN, UNSPECIFIED CHRONICITY: ICD-10-CM

## 2024-11-27 PROCEDURE — 73221 MRI JOINT UPR EXTREM W/O DYE: CPT

## 2024-12-09 ENCOUNTER — PREP FOR SURGERY (OUTPATIENT)
Dept: OTHER | Facility: HOSPITAL | Age: 54
End: 2024-12-09
Payer: COMMERCIAL

## 2024-12-09 ENCOUNTER — OFFICE VISIT (OUTPATIENT)
Dept: ORTHOPEDIC SURGERY | Facility: CLINIC | Age: 54
End: 2024-12-09
Payer: COMMERCIAL

## 2024-12-09 VITALS — HEIGHT: 66 IN | HEART RATE: 90 BPM | BODY MASS INDEX: 31.98 KG/M2 | WEIGHT: 199 LBS

## 2024-12-09 DIAGNOSIS — M75.121 COMPLETE TEAR OF RIGHT ROTATOR CUFF, UNSPECIFIED WHETHER TRAUMATIC: Primary | ICD-10-CM

## 2024-12-09 PROCEDURE — 97760 ORTHOTIC MGMT&TRAING 1ST ENC: CPT | Performed by: ORTHOPAEDIC SURGERY

## 2024-12-09 PROCEDURE — 99214 OFFICE O/P EST MOD 30 MIN: CPT | Performed by: ORTHOPAEDIC SURGERY

## 2024-12-09 NOTE — PROGRESS NOTES
"     Patient ID: Radha Tobias is a 54 y.o. female.  Right shoulder pain  Continued right shoulder pain despite previous therapy    Review of Systems:        Objective:    Pulse 90   Ht 167.6 cm (66\")   Wt 90.3 kg (199 lb)   LMP  (LMP Unknown)   BMI 32.12 kg/m²     Physical Examination:   Right shoulder demonstrates pain in the impingement area passive elevation 160 abduction 130 external rotation 40 internal rotation S1 with pain and weakness on Speed [place] supraspinatus testing.  Belly press and lift off are 4/5.  Sensory and motor exam are intact all distributions. Radial pulse is palpable and capillary refill is less than two seconds to all digits       Imaging:   MRI reviewed demonstrates high-grade near full-thickness anterior supraspinatus tear    Assessment:    Right shoulder near full-thickness cuff tear    Plan:   Further options were discussed she has had extensive nonoperative management including physical therapy activity modification and oral medication she would like to proceed with right shoulder arthroscopy with rotator cuff repair  Risks and benefits, specifically risks of bleeding, scar, infection, fracture, stiffness, retear, nerve, tendon or artery damage, the need for further surgery, DVT, and loss of life or limb and rehab were discussed. All questions were answered and addressed.  Postop sling dispensed  Greater than 15 minutes was spent demonstrating proper fit and use of the device and signs to monitor for complications      Procedures          Disclaimer: Part of this note may be an electronic transcription/translation of spoken language to printed text using the Dragon Dictation System  "

## 2024-12-16 NOTE — PROGRESS NOTES
Subjective   Radha Tobias is a 54 y.o. female. Presents to [facility]ICAL [facility]    Chief Complaint   Patient presents with    Heartburn       Heartburn  She complains of belching, globus sensation and heartburn. She reports no abdominal pain, no chest pain, no coughing, no dysphagia, no early satiety, no nausea, no sore throat or no wheezing. This is a chronic problem. The current episode started more than 1 month ago. The problem occurs frequently. The problem has been gradually improving. The heartburn duration is several minutes. The heartburn is located in the RUQ and back. The heartburn is of moderate intensity. The heartburn does not wake her from sleep. The heartburn does not limit her activity. The heartburn doesn't change with position. Nothing aggravates the symptoms. Pertinent negatives include no fatigue. She has tried an antacid for the symptoms. The treatment provided moderate relief.      She is feeling better  The protonix is helping.     I personally reviewed and updated the patient's allergies, medications, problem list, and past medical, surgical, social, and family history. I have reviewed and confirmed the accuracy of the History of Present Illness and Review of Symptoms as documented by the MA/LPN/RN. Julianne Glez MD    Allergies:  Allergies   Allergen Reactions    Sulfa Antibiotics Palpitations       Social History:  Social History     Socioeconomic History    Marital status:    Tobacco Use    Smoking status: Former     Current packs/day: 0.00     Average packs/day: 0.5 packs/day for 21.9 years (10.9 ttl pk-yrs)     Types: Cigarettes     Start date: 1988     Quit date: 11/23/2009     Years since quitting: 15.1     Passive exposure: Past    Smokeless tobacco: Never   Vaping Use    Vaping status: Never Used   Substance and Sexual Activity    Alcohol use: Yes     Alcohol/week: 2.0 standard drinks of alcohol     Types: 1 Glasses of wine, 1 Shots of liquor per week     Comment:  weekly    Drug use: No    Sexual activity: Yes       Family History:  History reviewed. No pertinent family history.    Past Medical History :  Patient Active Problem List   Diagnosis    Essential hypertension    Easy bruising    Mild episode of recurrent major depressive disorder    Anxiety    H/O total vaginal hysterectomy    GERD (gastroesophageal reflux disease)    Pituitary adenoma    Low libido    Pure hypercholesterolemia    Pain of right scapula    Rib pain on right side    Pityriasis rosea    Class 1 obesity due to excess calories with serious comorbidity and body mass index (BMI) of 32.0 to 32.9 in adult    Borderline diabetes    Colon cancer screening    Chronic rhinitis    Chronic right shoulder pain    Complete tear of right rotator cuff       Medication List:    Current Outpatient Medications:     escitalopram (LEXAPRO) 10 MG tablet, Take 1 tablet by mouth Daily., Disp: 90 tablet, Rfl: 3    fexofenadine (ALLEGRA) 180 MG tablet, Take 1 tablet by mouth Daily., Disp: 30 tablet, Rfl: 12    fluticasone (FLONASE) 50 MCG/ACT nasal spray, 1 spray into the nostril(s) as directed by provider Daily., Disp: 16 g, Rfl: 12    hydrOXYzine (ATARAX) 25 MG tablet, Take 1 tablet by mouth 3 (Three) Times a Day As Needed for Anxiety. (Patient taking differently: Take 1 tablet by mouth 3 (Three) Times a Day As Needed for Anxiety. As needed), Disp: 90 tablet, Rfl: 0    lisinopril-hydrochlorothiazide (PRINZIDE,ZESTORETIC) 20-12.5 MG per tablet, TAKE 1 TABLET BY MOUTH DAILY, Disp: 30 tablet, Rfl: 12    pantoprazole (PROTONIX) 40 MG EC tablet, Take 1 tablet by mouth Daily., Disp: 30 tablet, Rfl: 3    polyethylene glycol (MiraLax) 17 GM/SCOOP powder, Take as directed per instructions for bowel prep, Disp: 238 g, Rfl: 0    rosuvastatin (CRESTOR) 5 MG tablet, Take 1 tablet by mouth Daily., Disp: 30 tablet, Rfl: 12    tiZANidine (ZANAFLEX) 4 MG tablet, Take 1 tablet by mouth At Night As Needed for Muscle Spasms., Disp: 30 tablet,  "Rfl: 0    Past Surgical History:  Past Surgical History:   Procedure Laterality Date    APPENDECTOMY      COLONOSCOPY N/A 4/8/2024    Procedure: COLONOSCOPY with polypectomy;  Surgeon: Kelton Jean MD;  Location: [facility] ENDOSCOPY;  Service: Gastroenterology;  Laterality: N/A;  diverticulosis, colon polyps    HYSTERECTOMY      for fibroids, not cancer    TUBAL ABDOMINAL LIGATION           Physical Exam:      Vital Signs:    Vitals:    12/19/24 1601   BP: 128/84   Pulse: 112   Resp: 18   Temp: 96.9 °F (36.1 °C)   SpO2: 99%        /84 (BP Location: Right arm, Patient Position: Sitting, Cuff Size: Adult)   Pulse 112   Temp 96.9 °F (36.1 °C) (Temporal)   Resp 18   Ht 167.6 cm (66\")   Wt 90.6 kg (199 lb 12.8 oz)   LMP  (LMP Unknown)   SpO2 99% Comment: ra  BMI 32.25 kg/m²     Wt Readings from Last 3 Encounters:   12/19/24 90.6 kg (199 lb 12.8 oz)   12/09/24 90.3 kg (199 lb)   10/17/24 90.3 kg (199 lb)       Result Review :                Physical Exam  Vitals reviewed.   Constitutional:       Appearance: Normal appearance. She is well-developed.   HENT:      Head: Normocephalic and atraumatic.   Eyes:      General:         Right eye: No discharge.         Left eye: No discharge.   Cardiovascular:      Rate and Rhythm: Normal rate and regular rhythm.      Heart sounds: Normal heart sounds. No murmur heard.     No friction rub. No gallop.   Pulmonary:      Effort: Pulmonary effort is normal. No respiratory distress.      Breath sounds: Normal breath sounds. No wheezing or rales.   Skin:     General: Skin is warm and dry.      Findings: No rash.   Neurological:      Mental Status: She is alert and oriented to person, place, and time.      Coordination: Coordination normal.      Gait: Gait normal.   Psychiatric:         Behavior: Behavior is cooperative.         Assessment and Plan:  Problems Addressed this Visit          Endocrine and Metabolic    Class 1 obesity due to excess calories with serious " comorbidity and body mass index (BMI) of 32.0 to 32.9 in adult     Patient's (Body mass index is 32.25 kg/m².) indicates that they are obese (BMI >30) with health conditions that include hypertension and dyslipidemias . Weight is unchanged. BMI  is above average; BMI management plan is completed. We discussed low calorie, low carb based diet program, portion control, and increasing exercise.             Gastrointestinal Abdominal     GERD (gastroesophageal reflux disease) - Primary     Much better  Continue current treatment and diet changes          Diagnoses         Codes Comments    Gastroesophageal reflux disease, unspecified whether esophagitis present    -  Primary ICD-10-CM: K21.9  ICD-9-CM: 530.81     Class 1 obesity due to excess calories with serious comorbidity and body mass index (BMI) of 32.0 to 32.9 in adult     ICD-10-CM: E66.811, E66.09, Z68.32  ICD-9-CM: 278.00, V85.32                          An After Visit Summary and PPPS were given to the patient.       This document is intended for medical expert use only. Reading of this document by patients and/or patient's family without participating medical staff guidance may result in misinterpretation and unintended morbidity. Any interpretation of such data is the responsibility of the patient and/or family member responsible for the patient in concert with their primary or specialist providers, not to be left for sources of online searches such as [employer], [employer] or similar queries. Relying on these approaches to knowledge may result in misinterpretation, misguided goals of care and even death should patients or family members try recommendations outside of the realm of professional medical care.

## 2024-12-19 ENCOUNTER — OFFICE VISIT (OUTPATIENT)
Dept: FAMILY MEDICINE CLINIC | Facility: CLINIC | Age: 54
End: 2024-12-19
Payer: COMMERCIAL

## 2024-12-19 VITALS
HEIGHT: 66 IN | BODY MASS INDEX: 32.11 KG/M2 | OXYGEN SATURATION: 99 % | TEMPERATURE: 96.9 F | RESPIRATION RATE: 18 BRPM | WEIGHT: 199.8 LBS | HEART RATE: 112 BPM | SYSTOLIC BLOOD PRESSURE: 128 MMHG | DIASTOLIC BLOOD PRESSURE: 84 MMHG

## 2024-12-19 DIAGNOSIS — E66.09 CLASS 1 OBESITY DUE TO EXCESS CALORIES WITH SERIOUS COMORBIDITY AND BODY MASS INDEX (BMI) OF 32.0 TO 32.9 IN ADULT: ICD-10-CM

## 2024-12-19 DIAGNOSIS — K21.9 GASTROESOPHAGEAL REFLUX DISEASE, UNSPECIFIED WHETHER ESOPHAGITIS PRESENT: Primary | ICD-10-CM

## 2024-12-19 DIAGNOSIS — E66.811 CLASS 1 OBESITY DUE TO EXCESS CALORIES WITH SERIOUS COMORBIDITY AND BODY MASS INDEX (BMI) OF 32.0 TO 32.9 IN ADULT: ICD-10-CM

## 2024-12-19 PROCEDURE — 99213 OFFICE O/P EST LOW 20 MIN: CPT | Performed by: FAMILY MEDICINE

## 2024-12-31 NOTE — ASSESSMENT & PLAN NOTE
Patient's (Body mass index is 32.25 kg/m².) indicates that they are obese (BMI >30) with health conditions that include hypertension and dyslipidemias . Weight is unchanged. BMI  is above average; BMI management plan is completed. We discussed low calorie, low carb based diet program, portion control, and increasing exercise.

## 2025-01-02 DIAGNOSIS — J30.89 NON-SEASONAL ALLERGIC RHINITIS DUE TO OTHER ALLERGIC TRIGGER: ICD-10-CM

## 2025-01-03 RX ORDER — FEXOFENADINE HCL 180 MG/1
180 TABLET ORAL DAILY
Qty: 30 TABLET | Refills: 12 | Status: SHIPPED | OUTPATIENT
Start: 2025-01-03

## 2025-01-09 ENCOUNTER — HOSPITAL ENCOUNTER (OUTPATIENT)
Dept: CARDIOLOGY | Facility: HOSPITAL | Age: 55
Discharge: HOME | End: 2025-01-09
Payer: COMMERCIAL

## 2025-01-09 ENCOUNTER — LAB (OUTPATIENT)
Dept: LAB | Facility: HOSPITAL | Age: 55
End: 2025-01-09
Payer: COMMERCIAL

## 2025-01-09 DIAGNOSIS — M75.121 COMPLETE TEAR OF RIGHT ROTATOR CUFF, UNSPECIFIED WHETHER TRAUMATIC: ICD-10-CM

## 2025-01-09 LAB
ANION GAP SERPL CALCULATED.3IONS-SCNC: 12 MMOL/L (ref 5–15)
APTT PPP: 26.8 SECONDS (ref 22.7–35.4)
BASOPHILS # BLD AUTO: 0.03 10*3/MM3 (ref 0–0.2)
BASOPHILS NFR BLD AUTO: 0.4 % (ref 0–1.5)
BUN SERPL-MCNC: 10 MG/DL (ref 6–20)
BUN/CREAT SERPL: 10.9 (ref 7–25)
CALCIUM SPEC-SCNC: 9.9 MG/DL (ref 8.6–10.5)
CHLORIDE SERPL-SCNC: 100 MMOL/L (ref 98–107)
CO2 SERPL-SCNC: 28 MMOL/L (ref 22–29)
CREAT SERPL-MCNC: 0.92 MG/DL (ref 0.57–1)
DEPRECATED RDW RBC AUTO: 47.4 FL (ref 37–54)
EGFRCR SERPLBLD CKD-EPI 2021: 74.6 ML/MIN/1.73
EOSINOPHIL # BLD AUTO: 0.14 10*3/MM3 (ref 0–0.4)
EOSINOPHIL NFR BLD AUTO: 1.8 % (ref 0.3–6.2)
ERYTHROCYTE [DISTWIDTH] IN BLOOD BY AUTOMATED COUNT: 13.3 % (ref 12.3–15.4)
GLUCOSE SERPL-MCNC: 116 MG/DL (ref 65–99)
HBA1C MFR BLD: 5.8 % (ref 4.8–5.6)
HCT VFR BLD AUTO: 38.7 % (ref 34–46.6)
HGB BLD-MCNC: 12.6 G/DL (ref 12–15.9)
IMM GRANULOCYTES # BLD AUTO: 0.02 10*3/MM3 (ref 0–0.05)
IMM GRANULOCYTES NFR BLD AUTO: 0.3 % (ref 0–0.5)
INR PPP: 0.97 (ref 0.9–1.1)
LYMPHOCYTES # BLD AUTO: 2.18 10*3/MM3 (ref 0.7–3.1)
LYMPHOCYTES NFR BLD AUTO: 28.2 % (ref 19.6–45.3)
MCH RBC QN AUTO: 31.3 PG (ref 26.6–33)
MCHC RBC AUTO-ENTMCNC: 32.6 G/DL (ref 31.5–35.7)
MCV RBC AUTO: 96 FL (ref 79–97)
MONOCYTES # BLD AUTO: 0.7 10*3/MM3 (ref 0.1–0.9)
MONOCYTES NFR BLD AUTO: 9 % (ref 5–12)
NEUTROPHILS NFR BLD AUTO: 4.67 10*3/MM3 (ref 1.7–7)
NEUTROPHILS NFR BLD AUTO: 60.3 % (ref 42.7–76)
NRBC BLD AUTO-RTO: 0 /100 WBC (ref 0–0.2)
PLATELET # BLD AUTO: 248 10*3/MM3 (ref 140–450)
PMV BLD AUTO: 9.4 FL (ref 6–12)
POTASSIUM SERPL-SCNC: 4.2 MMOL/L (ref 3.5–5.2)
PROTHROMBIN TIME: 12.9 SECONDS (ref 11.7–14.2)
RBC # BLD AUTO: 4.03 10*6/MM3 (ref 3.77–5.28)
SODIUM SERPL-SCNC: 140 MMOL/L (ref 136–145)
WBC NRBC COR # BLD AUTO: 7.74 10*3/MM3 (ref 3.4–10.8)

## 2025-01-09 PROCEDURE — 85730 THROMBOPLASTIN TIME PARTIAL: CPT

## 2025-01-09 PROCEDURE — 36415 COLL VENOUS BLD VENIPUNCTURE: CPT

## 2025-01-09 PROCEDURE — 85025 COMPLETE CBC W/AUTO DIFF WBC: CPT

## 2025-01-09 PROCEDURE — 85610 PROTHROMBIN TIME: CPT

## 2025-01-09 PROCEDURE — 93005 ELECTROCARDIOGRAM TRACING: CPT | Performed by: ORTHOPAEDIC SURGERY

## 2025-01-09 PROCEDURE — 80048 BASIC METABOLIC PNL TOTAL CA: CPT

## 2025-01-09 PROCEDURE — 83036 HEMOGLOBIN GLYCOSYLATED A1C: CPT

## 2025-01-13 LAB
QT INTERVAL: 370 MS
QTC INTERVAL: 432 MS

## 2025-01-16 RX ORDER — CEPHALEXIN 500 MG/1
500 CAPSULE ORAL 4 TIMES DAILY
Qty: 4 CAPSULE | Refills: 0 | Status: SHIPPED | OUTPATIENT
Start: 2025-01-16 | End: 2025-01-17

## 2025-01-16 RX ORDER — PROMETHAZINE HYDROCHLORIDE 12.5 MG/1
12.5 TABLET ORAL EVERY 6 HOURS PRN
Qty: 21 TABLET | Refills: 1 | Status: SHIPPED | OUTPATIENT
Start: 2025-01-16

## 2025-01-16 RX ORDER — OXYCODONE AND ACETAMINOPHEN 5; 325 MG/1; MG/1
1 TABLET ORAL EVERY 6 HOURS PRN
Qty: 28 TABLET | Refills: 0 | Status: SHIPPED | OUTPATIENT
Start: 2025-01-16

## 2025-01-16 RX ORDER — NAPROXEN 500 MG/1
500 TABLET ORAL 2 TIMES DAILY WITH MEALS
Qty: 28 TABLET | Refills: 0 | Status: SHIPPED | OUTPATIENT
Start: 2025-01-16

## 2025-01-16 NOTE — H&P
[facility]   HISTORY AND PHYSICAL    Patient Name: Radha Tobias  : 1970  MRN: [id number]  Primary Care Physician:  Julianne Glez MD  Date of admission: (Not on file)    Subjective   Subjective     Chief Complaint: Right shoulder pain    This is a 54-year-old female with right shoulder pain presenting for shoulder arthroscopy cuff repair        Review of Systems   Cardiovascular:  Negative for chest pain.   Musculoskeletal:  Positive for arthralgias.        Personal History     Past Medical History:   Diagnosis Date    Anesthesia complication     hard to wake    Anxiety     Depression     Hypertension        Past Surgical History:   Procedure Laterality Date    APPENDECTOMY      COLONOSCOPY N/A 2024    Procedure: COLONOSCOPY with polypectomy;  Surgeon: Kelton Jean MD;  Location: [facility] ENDOSCOPY;  Service: Gastroenterology;  Laterality: N/A;  diverticulosis, colon polyps    HYSTERECTOMY      for fibroids, not cancer    TUBAL ABDOMINAL LIGATION         Family History: family history is not on file. Otherwise pertinent FHx was reviewed and not pertinent to current issue.    Social History:  reports that she quit smoking about 15 years ago. Her smoking use included cigarettes. She started smoking about 37 years ago. She has a 10.9 pack-year smoking history. She has been exposed to tobacco smoke. She has never used smokeless tobacco. She reports current alcohol use of about 2.0 standard drinks of alcohol per week. She reports that she does not use drugs.    Home Medications:  escitalopram, fexofenadine, fluticasone, hydrOXYzine, lisinopril-hydrochlorothiazide, pantoprazole, polyethylene glycol, rosuvastatin, and tiZANidine    Allergies:  Allergies   Allergen Reactions    Sulfa Antibiotics Palpitations       Objective    Objective      Right shoulder demonstrates pain in the impingement area passive elevation 160 abduction 130 external rotation 40 internal rotation S1 with pain and weakness on  Speed [place] supraspinatus testing.  Belly press and lift off are 4/5.  Sensory and motor exam are intact all distributions. Radial pulse is palpable and capillary refill is less than two seconds to all digits        Imaging:   MRI reviewed demonstrates high-grade near full-thickness anterior supraspinatus tear     Assessment:    Right shoulder near full-thickness cuff tear     Plan:   Further options were discussed she has had extensive nonoperative management including physical therapy activity modification and oral medication she would like to proceed with right shoulder arthroscopy with rotator cuff repair  Risks and benefits, specifically risks of bleeding, scar, infection, fracture, stiffness, retear, nerve, tendon or artery damage, the need for further surgery, DVT, and loss of life or limb and rehab were discussed. All questions were answered and addressed.  Postop sling dispensed  Greater than 15 minutes was spent demonstrating proper fit and use of the device and signs to monitor for complications    Reynold Avila MD

## 2025-01-17 ENCOUNTER — ANESTHESIA EVENT (OUTPATIENT)
Dept: PERIOP | Facility: HOSPITAL | Age: 55
End: 2025-01-17
Payer: COMMERCIAL

## 2025-01-17 ENCOUNTER — ANESTHESIA (OUTPATIENT)
Dept: PERIOP | Facility: HOSPITAL | Age: 55
End: 2025-01-17
Payer: COMMERCIAL

## 2025-01-17 ENCOUNTER — HOSPITAL ENCOUNTER (OUTPATIENT)
Facility: HOSPITAL | Age: 55
Setting detail: HOSPITAL OUTPATIENT SURGERY
Discharge: HOME | End: 2025-01-17
Attending: ORTHOPAEDIC SURGERY | Admitting: ORTHOPAEDIC SURGERY
Payer: COMMERCIAL

## 2025-01-17 VITALS
HEART RATE: 88 BPM | HEIGHT: 66 IN | SYSTOLIC BLOOD PRESSURE: 136 MMHG | RESPIRATION RATE: 19 BRPM | TEMPERATURE: 98.6 F | WEIGHT: 197.8 LBS | DIASTOLIC BLOOD PRESSURE: 83 MMHG | OXYGEN SATURATION: 94 % | BODY MASS INDEX: 31.79 KG/M2

## 2025-01-17 DIAGNOSIS — M75.121 COMPLETE TEAR OF RIGHT ROTATOR CUFF, UNSPECIFIED WHETHER TRAUMATIC: Primary | ICD-10-CM

## 2025-01-17 PROCEDURE — 25010000002 PHENYLEPHRINE 10 MG/ML SOLUTION: Performed by: NURSE ANESTHETIST, CERTIFIED REGISTERED

## 2025-01-17 PROCEDURE — 25810000003 LACTATED RINGERS PER 1000 ML: Performed by: ANESTHESIOLOGY

## 2025-01-17 PROCEDURE — 25010000002 DEXAMETHASONE PER 1 MG: Performed by: ANESTHESIOLOGY

## 2025-01-17 PROCEDURE — 25010000002 LABETALOL 5 MG/ML SOLUTION: Performed by: NURSE ANESTHETIST, CERTIFIED REGISTERED

## 2025-01-17 PROCEDURE — 25010000002 LIDOCAINE 1 % SOLUTION 20 ML VIAL: Performed by: ORTHOPAEDIC SURGERY

## 2025-01-17 PROCEDURE — 25010000002 PROPOFOL 1000 MG/100ML EMULSION: Performed by: NURSE ANESTHETIST, CERTIFIED REGISTERED

## 2025-01-17 PROCEDURE — 25010000002 EPINEPHRINE PER 0.1 MG: Performed by: ORTHOPAEDIC SURGERY

## 2025-01-17 PROCEDURE — 25010000002 LIDOCAINE 1% - EPINEPHRINE 1:100000 1 %-1:100000 SOLUTION 20 ML VIAL: Performed by: ORTHOPAEDIC SURGERY

## 2025-01-17 PROCEDURE — 29827 SHO ARTHRS SRG RT8TR CUF RPR: CPT | Performed by: ORTHOPAEDIC SURGERY

## 2025-01-17 PROCEDURE — C1713 ANCHOR/SCREW BN/BN,TIS/BN: HCPCS | Performed by: ORTHOPAEDIC SURGERY

## 2025-01-17 PROCEDURE — 29827 SHO ARTHRS SRG RT8TR CUF RPR: CPT

## 2025-01-17 PROCEDURE — 25010000002 MAGNESIUM SULFATE PER 500 MG OF MAGNESIUM: Performed by: NURSE ANESTHETIST, CERTIFIED REGISTERED

## 2025-01-17 PROCEDURE — 25010000002 ROPIVACAINE PER 1 MG: Performed by: ANESTHESIOLOGY

## 2025-01-17 PROCEDURE — 25010000002 BUPIVACAINE (PF) 0.25 % SOLUTION 30 ML VIAL: Performed by: ORTHOPAEDIC SURGERY

## 2025-01-17 PROCEDURE — 25010000002 SUGAMMADEX 200 MG/2ML SOLUTION: Performed by: NURSE ANESTHETIST, CERTIFIED REGISTERED

## 2025-01-17 PROCEDURE — 25010000002 CEFAZOLIN PER 500 MG: Performed by: ORTHOPAEDIC SURGERY

## 2025-01-17 PROCEDURE — 25010000002 KETOROLAC TROMETHAMINE PER 15 MG: Performed by: ORTHOPAEDIC SURGERY

## 2025-01-17 PROCEDURE — 25010000002 ONDANSETRON PER 1 MG: Performed by: NURSE ANESTHETIST, CERTIFIED REGISTERED

## 2025-01-17 PROCEDURE — 25010000002 ESMOLOL 100 MG/10ML SOLUTION: Performed by: NURSE ANESTHETIST, CERTIFIED REGISTERED

## 2025-01-17 PROCEDURE — 25010000002 FENTANYL CITRATE (PF) 100 MCG/2ML SOLUTION: Performed by: NURSE ANESTHETIST, CERTIFIED REGISTERED

## 2025-01-17 PROCEDURE — 25010000002 MIDAZOLAM PER 1 MG: Performed by: ANESTHESIOLOGY

## 2025-01-17 PROCEDURE — 25010000002 LIDOCAINE (CARDIAC): Performed by: NURSE ANESTHETIST, CERTIFIED REGISTERED

## 2025-01-17 DEVICE — HI-FI® PASSING LOOP
Type: IMPLANTABLE DEVICE | Site: SHOULDER | Status: FUNCTIONAL
Brand: HI-FI

## 2025-01-17 DEVICE — 4.75 MM ARGO KNOTLESS GENESYS ANCHOR
Type: IMPLANTABLE DEVICE | Site: SHOULDER | Status: FUNCTIONAL
Brand: ARGO KNOTLESS GENESYS

## 2025-01-17 DEVICE — 2MM HI-FI® TAPE BLUE
Type: IMPLANTABLE DEVICE | Site: SHOULDER | Status: FUNCTIONAL
Brand: HI-FI

## 2025-01-17 RX ORDER — ONDANSETRON 2 MG/ML
4 INJECTION INTRAMUSCULAR; INTRAVENOUS ONCE AS NEEDED
Status: DISCONTINUED | OUTPATIENT
Start: 2025-01-17 | End: 2025-01-17 | Stop reason: HOSPADM

## 2025-01-17 RX ORDER — OXYCODONE HYDROCHLORIDE 5 MG/1
5 TABLET ORAL ONCE AS NEEDED
Status: DISCONTINUED | OUTPATIENT
Start: 2025-01-17 | End: 2025-01-17 | Stop reason: HOSPADM

## 2025-01-17 RX ORDER — ROCURONIUM BROMIDE 10 MG/ML
INJECTION, SOLUTION INTRAVENOUS AS NEEDED
Status: DISCONTINUED | OUTPATIENT
Start: 2025-01-17 | End: 2025-01-17 | Stop reason: SURG

## 2025-01-17 RX ORDER — MEPERIDINE HYDROCHLORIDE 25 MG/ML
12.5 INJECTION INTRAMUSCULAR; INTRAVENOUS; SUBCUTANEOUS
Status: DISCONTINUED | OUTPATIENT
Start: 2025-01-17 | End: 2025-01-17 | Stop reason: HOSPADM

## 2025-01-17 RX ORDER — ROPIVACAINE HYDROCHLORIDE 5 MG/ML
INJECTION, SOLUTION EPIDURAL; INFILTRATION; PERINEURAL
Status: COMPLETED | OUTPATIENT
Start: 2025-01-17 | End: 2025-01-17

## 2025-01-17 RX ORDER — SODIUM CHLORIDE, SODIUM LACTATE, POTASSIUM CHLORIDE, CALCIUM CHLORIDE 600; 310; 30; 20 MG/100ML; MG/100ML; MG/100ML; MG/100ML
1000 INJECTION, SOLUTION INTRAVENOUS CONTINUOUS
Status: DISPENSED | OUTPATIENT
Start: 2025-01-17 | End: 2025-01-17

## 2025-01-17 RX ORDER — LIDOCAINE HYDROCHLORIDE 10 MG/ML
0.5 INJECTION, SOLUTION EPIDURAL; INFILTRATION; INTRACAUDAL; PERINEURAL ONCE AS NEEDED
Status: DISCONTINUED | OUTPATIENT
Start: 2025-01-17 | End: 2025-01-17 | Stop reason: HOSPADM

## 2025-01-17 RX ORDER — MAGNESIUM SULFATE HEPTAHYDRATE 500 MG/ML
INJECTION, SOLUTION INTRAMUSCULAR; INTRAVENOUS AS NEEDED
Status: DISCONTINUED | OUTPATIENT
Start: 2025-01-17 | End: 2025-01-17 | Stop reason: SURG

## 2025-01-17 RX ORDER — HYDRALAZINE HYDROCHLORIDE 20 MG/ML
5 INJECTION INTRAMUSCULAR; INTRAVENOUS
Status: DISCONTINUED | OUTPATIENT
Start: 2025-01-17 | End: 2025-01-17 | Stop reason: HOSPADM

## 2025-01-17 RX ORDER — OXYCODONE HYDROCHLORIDE 5 MG/1
10 TABLET ORAL EVERY 4 HOURS PRN
Status: DISCONTINUED | OUTPATIENT
Start: 2025-01-17 | End: 2025-01-17 | Stop reason: HOSPADM

## 2025-01-17 RX ORDER — ESMOLOL HYDROCHLORIDE 10 MG/ML
INJECTION INTRAVENOUS AS NEEDED
Status: DISCONTINUED | OUTPATIENT
Start: 2025-01-17 | End: 2025-01-17 | Stop reason: SURG

## 2025-01-17 RX ORDER — IPRATROPIUM BROMIDE AND ALBUTEROL SULFATE 2.5; .5 MG/3ML; MG/3ML
3 SOLUTION RESPIRATORY (INHALATION) ONCE AS NEEDED
Status: DISCONTINUED | OUTPATIENT
Start: 2025-01-17 | End: 2025-01-17 | Stop reason: HOSPADM

## 2025-01-17 RX ORDER — LABETALOL HYDROCHLORIDE 5 MG/ML
INJECTION, SOLUTION INTRAVENOUS AS NEEDED
Status: DISCONTINUED | OUTPATIENT
Start: 2025-01-17 | End: 2025-01-17 | Stop reason: SURG

## 2025-01-17 RX ORDER — DEXAMETHASONE SODIUM PHOSPHATE 4 MG/ML
INJECTION, SOLUTION INTRA-ARTICULAR; INTRALESIONAL; INTRAMUSCULAR; INTRAVENOUS; SOFT TISSUE AS NEEDED
Status: DISCONTINUED | OUTPATIENT
Start: 2025-01-17 | End: 2025-01-17 | Stop reason: SURG

## 2025-01-17 RX ORDER — KETOROLAC TROMETHAMINE 30 MG/ML
INJECTION, SOLUTION INTRAMUSCULAR; INTRAVENOUS
Status: DISCONTINUED
Start: 2025-01-17 | End: 2025-01-17 | Stop reason: HOSPADM

## 2025-01-17 RX ORDER — MIDAZOLAM HYDROCHLORIDE 1 MG/ML
1 INJECTION, SOLUTION INTRAMUSCULAR; INTRAVENOUS
Status: DISCONTINUED | OUTPATIENT
Start: 2025-01-17 | End: 2025-01-17 | Stop reason: HOSPADM

## 2025-01-17 RX ORDER — ACETAMINOPHEN 650 MG/1
650 SUPPOSITORY RECTAL EVERY 4 HOURS PRN
Status: DISCONTINUED | OUTPATIENT
Start: 2025-01-17 | End: 2025-01-17 | Stop reason: HOSPADM

## 2025-01-17 RX ORDER — LIDOCAINE HYDROCHLORIDE AND EPINEPHRINE 10; 10 MG/ML; UG/ML
INJECTION, SOLUTION INFILTRATION; PERINEURAL
Status: DISCONTINUED
Start: 2025-01-17 | End: 2025-01-17 | Stop reason: HOSPADM

## 2025-01-17 RX ORDER — FLUMAZENIL 0.1 MG/ML
0.2 INJECTION INTRAVENOUS AS NEEDED
Status: DISCONTINUED | OUTPATIENT
Start: 2025-01-17 | End: 2025-01-17 | Stop reason: HOSPADM

## 2025-01-17 RX ORDER — FENTANYL CITRATE 50 UG/ML
INJECTION, SOLUTION INTRAMUSCULAR; INTRAVENOUS AS NEEDED
Status: DISCONTINUED | OUTPATIENT
Start: 2025-01-17 | End: 2025-01-17 | Stop reason: SURG

## 2025-01-17 RX ORDER — BUPIVACAINE HYDROCHLORIDE 2.5 MG/ML
INJECTION, SOLUTION EPIDURAL; INFILTRATION; INTRACAUDAL
Status: DISCONTINUED
Start: 2025-01-17 | End: 2025-01-17 | Stop reason: HOSPADM

## 2025-01-17 RX ORDER — ACETAMINOPHEN 325 MG/1
650 TABLET ORAL ONCE AS NEEDED
Status: DISCONTINUED | OUTPATIENT
Start: 2025-01-17 | End: 2025-01-17 | Stop reason: HOSPADM

## 2025-01-17 RX ORDER — DEXAMETHASONE SODIUM PHOSPHATE 4 MG/ML
INJECTION, SOLUTION INTRA-ARTICULAR; INTRALESIONAL; INTRAMUSCULAR; INTRAVENOUS; SOFT TISSUE
Status: COMPLETED | OUTPATIENT
Start: 2025-01-17 | End: 2025-01-17

## 2025-01-17 RX ORDER — MIDAZOLAM HYDROCHLORIDE 1 MG/ML
INJECTION, SOLUTION INTRAMUSCULAR; INTRAVENOUS AS NEEDED
Status: DISCONTINUED | OUTPATIENT
Start: 2025-01-17 | End: 2025-01-17 | Stop reason: SURG

## 2025-01-17 RX ORDER — ONDANSETRON 2 MG/ML
INJECTION INTRAMUSCULAR; INTRAVENOUS AS NEEDED
Status: DISCONTINUED | OUTPATIENT
Start: 2025-01-17 | End: 2025-01-17 | Stop reason: SURG

## 2025-01-17 RX ORDER — LIDOCAINE HYDROCHLORIDE 10 MG/ML
INJECTION, SOLUTION INFILTRATION; PERINEURAL
Status: DISCONTINUED
Start: 2025-01-17 | End: 2025-01-17 | Stop reason: HOSPADM

## 2025-01-17 RX ORDER — NALOXONE HCL 0.4 MG/ML
0.4 VIAL (ML) INJECTION AS NEEDED
Status: DISCONTINUED | OUTPATIENT
Start: 2025-01-17 | End: 2025-01-17 | Stop reason: HOSPADM

## 2025-01-17 RX ORDER — FENTANYL CITRATE 50 UG/ML
50 INJECTION, SOLUTION INTRAMUSCULAR; INTRAVENOUS
Status: DISCONTINUED | OUTPATIENT
Start: 2025-01-17 | End: 2025-01-17 | Stop reason: HOSPADM

## 2025-01-17 RX ORDER — PROPOFOL 10 MG/ML
INJECTION, EMULSION INTRAVENOUS AS NEEDED
Status: DISCONTINUED | OUTPATIENT
Start: 2025-01-17 | End: 2025-01-17 | Stop reason: SURG

## 2025-01-17 RX ORDER — SODIUM CHLORIDE 0.9 % (FLUSH) 0.9 %
10 SYRINGE (ML) INJECTION AS NEEDED
Status: DISCONTINUED | OUTPATIENT
Start: 2025-01-17 | End: 2025-01-17 | Stop reason: HOSPADM

## 2025-01-17 RX ORDER — DIPHENHYDRAMINE HYDROCHLORIDE 50 MG/ML
12.5 INJECTION INTRAMUSCULAR; INTRAVENOUS
Status: DISCONTINUED | OUTPATIENT
Start: 2025-01-17 | End: 2025-01-17 | Stop reason: HOSPADM

## 2025-01-17 RX ORDER — LABETALOL HYDROCHLORIDE 5 MG/ML
5 INJECTION, SOLUTION INTRAVENOUS
Status: DISCONTINUED | OUTPATIENT
Start: 2025-01-17 | End: 2025-01-17 | Stop reason: HOSPADM

## 2025-01-17 RX ORDER — DEXMEDETOMIDINE HYDROCHLORIDE 100 UG/ML
INJECTION, SOLUTION INTRAVENOUS AS NEEDED
Status: DISCONTINUED | OUTPATIENT
Start: 2025-01-17 | End: 2025-01-17 | Stop reason: SURG

## 2025-01-17 RX ORDER — PHENYLEPHRINE HYDROCHLORIDE 10 MG/ML
INJECTION INTRAVENOUS AS NEEDED
Status: DISCONTINUED | OUTPATIENT
Start: 2025-01-17 | End: 2025-01-17 | Stop reason: SURG

## 2025-01-17 RX ADMIN — DEXMEDETOMIDINE HYDROCHLORIDE 10 MCG: 100 INJECTION, SOLUTION INTRAVENOUS at 14:09

## 2025-01-17 RX ADMIN — ROCURONIUM BROMIDE 50 MG: 10 INJECTION, SOLUTION INTRAVENOUS at 14:06

## 2025-01-17 RX ADMIN — CEFAZOLIN 2 G: 2 INJECTION, POWDER, FOR SOLUTION INTRAMUSCULAR; INTRAVENOUS at 13:56

## 2025-01-17 RX ADMIN — FENTANYL CITRATE 50 MCG: 50 INJECTION, SOLUTION INTRAMUSCULAR; INTRAVENOUS at 14:10

## 2025-01-17 RX ADMIN — FENTANYL CITRATE 50 MCG: 50 INJECTION, SOLUTION INTRAMUSCULAR; INTRAVENOUS at 14:20

## 2025-01-17 RX ADMIN — PROPOFOL 125 MCG/KG/MIN: 10 INJECTION, EMULSION INTRAVENOUS at 14:09

## 2025-01-17 RX ADMIN — MAGNESIUM SULFATE HEPTAHYDRATE 1 G: 500 INJECTION, SOLUTION INTRAMUSCULAR; INTRAVENOUS at 14:14

## 2025-01-17 RX ADMIN — DEXAMETHASONE SODIUM PHOSPHATE 4 MG: 4 INJECTION, SOLUTION INTRA-ARTICULAR; INTRALESIONAL; INTRAMUSCULAR; INTRAVENOUS; SOFT TISSUE at 14:14

## 2025-01-17 RX ADMIN — LABETALOL HYDROCHLORIDE 10 MG: 5 INJECTION, SOLUTION INTRAVENOUS at 14:40

## 2025-01-17 RX ADMIN — ROPIVACAINE HYDROCHLORIDE 30 ML: 5 INJECTION EPIDURAL; INFILTRATION; PERINEURAL at 13:09

## 2025-01-17 RX ADMIN — ESMOLOL HYDROCHLORIDE 20 MG: 100 INJECTION, SOLUTION INTRAVENOUS at 14:08

## 2025-01-17 RX ADMIN — ESMOLOL HYDROCHLORIDE 20 MG: 100 INJECTION, SOLUTION INTRAVENOUS at 14:09

## 2025-01-17 RX ADMIN — SODIUM CHLORIDE, POTASSIUM CHLORIDE, SODIUM LACTATE AND CALCIUM CHLORIDE 1000 ML: 600; 310; 30; 20 INJECTION, SOLUTION INTRAVENOUS at 11:57

## 2025-01-17 RX ADMIN — DEXAMETHASONE SODIUM PHOSPHATE 4 MG: 4 INJECTION, SOLUTION INTRAMUSCULAR; INTRAVENOUS at 13:09

## 2025-01-17 RX ADMIN — ESMOLOL HYDROCHLORIDE 20 MG: 100 INJECTION, SOLUTION INTRAVENOUS at 14:11

## 2025-01-17 RX ADMIN — SODIUM CHLORIDE, POTASSIUM CHLORIDE, SODIUM LACTATE AND CALCIUM CHLORIDE: 600; 310; 30; 20 INJECTION, SOLUTION INTRAVENOUS at 14:03

## 2025-01-17 RX ADMIN — MIDAZOLAM 2 MG: 1 INJECTION INTRAMUSCULAR; INTRAVENOUS at 13:09

## 2025-01-17 RX ADMIN — SUGAMMADEX 200 MG: 100 INJECTION, SOLUTION INTRAVENOUS at 14:51

## 2025-01-17 RX ADMIN — PROPOFOL 200 MG: 10 INJECTION, EMULSION INTRAVENOUS at 14:06

## 2025-01-17 RX ADMIN — LIDOCAINE HYDROCHLORIDE 80 MG: 20 INJECTION, SOLUTION INTRAVENOUS at 14:06

## 2025-01-17 RX ADMIN — ONDANSETRON 4 MG: 2 INJECTION INTRAMUSCULAR; INTRAVENOUS at 14:51

## 2025-01-17 RX ADMIN — PHENYLEPHRINE HYDROCHLORIDE 100 MCG: 10 INJECTION INTRAVENOUS at 14:23

## 2025-01-17 NOTE — OP NOTE
SHOULDER ARTHROSCOPY WITH ROTATOR CUFF REPAIR  Procedure Report    Patient Name:  Radha Tobias  YOB: 1970    Date of Surgery:  1/17/2025     Indications: This is a 54 y.o. female with pain to the right shoulder.  Imaging demonstrated rotator cuff tear.Treatment options were discussed.  They desired to proceed with shoulder arthroscopy with rotator cuff repair after discussing the risks including bleeding, scarring, infection, stiffness, nerve damage, tendon damage, artery damage, continued pain, DVT, loss of life or limb, and a need for further surgery.      Pre-op Diagnosis:   Complete tear of right rotator cuff, unspecified whether traumatic [M75.121]       Post-op Diagnosis:    Post-Op Diagnosis Codes:     * Complete tear of right rotator cuff, unspecified whether traumatic [M75.121]    Procedure/CPT® Codes: 85815    Procedure(s): Right  shoulder arthroscopy with rotator cuff repair     Assistant: Wilfrido Marques first assist assistant    was responsible for performing the following activities: Retraction, Suction, Irrigation, Suturing, Closing, and Placing Dressing and their skilled assistance was necessary for the success of this case.         Anesthesia: General with Block    IV fluids: See anesthesia record    Estimated Blood Loss: minimal    Implants:    Implant Name Type Inv. Item Serial No.  Lot No. LRB No. Used Action   SUT TPE HIFI NONABS JOSE 2MM - [id number] Implant SUT TPE HIFI NONABS JOSE 2MM  [employer] [id number] Right 1 Implanted   SUT LP NONABS CUFFLINK HIFI NMBR2 - [id number] Implant SUT LP NONABS CUFFLINK HIFI NMBR2  [employer] [id number] Right 1 Implanted   SUT/ANCH KNOTLSS TADEO GENSYS NMBR2/HI/FI/SUT 4.75MM - [id number] Implant SUT/ANCH KNOTLSS TADEO GENSYS NMBR2/HI/FI/SUT 4.75MM  [employer] [id number] Right 1 Implanted         Complications: None    Specimens:none    Description of Procedure: The patient's operative site was marked.  Regional anesthesia was  administered.  They were brought to the operating room and placed  on the operating room table.  General anesthesia was administered. Antibiotics were dosed.  A timeout was taken, confirming the correct operative site and procedure.  Exam under anesthesia indicated full range of motion and no instability.  They were placed in a semilateral position.  An axillary roll and SCDs were placed.  The right shoulder was prepped and draped in the standard surgical fashion.  The shoulder was injected with local anesthetic and was placed into traction.  A posterior portal was created.  A camera was inserted.  The glenoid chondral surface was intact.  The humerus surface was intact.  The subscapularis was intact.  The biceps was intact.  The labrum was intact.  The undersurface of the cuff demonstrated blistering at the supraspinatus footprint. A shaver was inserted.  The labrum intact.  The biceps was pulled into the shoulder and found to be intact.  The axillary pouch was free of synovitis or loose bodies.  Abnormal supraspinatus was tagged with a PDS suture the instruments were placed in the subacromial space.  A full-thickness bursectomy was performed.  The CA ligament was intact.  No impingement was noted.  An accessory portal was created. Tear was visualized and prepared.  It was a high-grade bursal sided tear that after light debridement demonstrated 90% supraspinatus tear, tear was completed and measured 1.5 cm crescent shape tear.  Tuberosity skeletonized and a microfracture performed.   Fiber tape suture as well as a fiber link suture were used to create a mattress and ripstop configuration. These were secured to a anchor off the lateral edge of the tuberosity restoring the tendon to its footprint.   The instruments were removed.  The wounds were closed with suture and Steri-Strips.    30 mg of Toradol and lidocaine were injected into the deltoid  and a sterile dressing was applied to the rest of the shoulder.  They  were placed in a sling and taken to the recovery room.  There were no complications.  I was present for all portions.  All counts were correct.  Good capillary refill was noted to the hand.      Reynold Avila MD     Date: 1/17/2025  Time: 14:46 EST

## 2025-01-17 NOTE — ANESTHESIA POSTPROCEDURE EVALUATION
Patient: Radha Tobias    Procedure Summary       Date: 01/17/25 Room / Location: [facility] OR 04 / [facility] MAIN OR    Anesthesia Start: 1401 Anesthesia Stop: 1510    Procedure: shoulder arthroscopy with rotator cuff repair and associated procedures (Right: Shoulder) Diagnosis:       Complete tear of right rotator cuff, unspecified whether traumatic      (Complete tear of right rotator cuff, unspecified whether traumatic [M75.121])    Surgeons: Reynold Avila MD Provider: Aubrey Hwang MD    Anesthesia Type: general ASA Status: 3            Anesthesia Type: general    Vitals  Vitals Value Taken Time   /79 01/17/25 1551   Temp 97.8 °F (36.6 °C) 01/17/25 1551   Pulse 85 01/17/25 1552   Resp 16 01/17/25 1551   SpO2 92 % 01/17/25 1552   Vitals shown include unfiled device data.        Post Anesthesia Care and Evaluation    Patient location during evaluation: PACU  Patient participation: complete - patient participated  Level of consciousness: awake  Pain score: 0  Pain management: adequate  Anesthetic complications: No anesthetic complications  PONV Status: none  Cardiovascular status: acceptable  Respiratory status: acceptable  Hydration status: acceptable

## 2025-01-17 NOTE — ANESTHESIA PROCEDURE NOTES
Peripheral Block    Pre-sedation assessment completed: 1/17/2025 1:05 PM    Patient reassessed immediately prior to procedure    Patient location during procedure: pre-op  Start time: 1/17/2025 1:05 PM  Stop time: 1/17/2025 1:09 PM  Reason for block: at surgeon's request and post-op pain management  Performed by  Anesthesiologist: Aubrey Hwang MD  Preanesthetic Checklist  Completed: patient identified, risks and benefits discussed, surgical consent, monitors and equipment checked, pre-op evaluation and timeout performed  Prep:  Pt Position: supine  Prep: ChloraPrep  Patient monitoring: blood pressure monitoring, continuous pulse oximetry and EKG  Procedure    Sedation: yes  Performed under: local infiltration  Guidance:ultrasound guided    ULTRASOUND INTERPRETATION.  Using ultrasound guidance a gauge needle was placed in close proximity to the brachial plexus nerve, at which point, under ultrasound guidance anesthetic was injected in the area of the nerve and spread of the anesthesia was seen on ultrasound in close proximity thereto.  There were no abnormalities seen on ultrasound; a digital image was taken; and the patient tolerated the procedure with no complications. Images:still images obtained, printed/placed on chart    Laterality:right  Block Type:interscalene  Injection Technique:single-shot    Medications Used: dexamethasone (DECADRON) injection - Injection   4 mg - 1/17/2025 1:09:00 PM  ropivacaine (NAROPIN) 0.5 % injection - Perineural   30 mL - 1/17/2025 1:09:00 PM      Post Assessment  Injection Assessment: negative aspiration for heme, no paresthesia on injection and incremental injection  Patient Tolerance:comfortable throughout block  Complications:no  Additional Notes  Ultrasound Interpretation:  Using ultrasound guidance the needle was placed in close proximity to the brachial plexus and anesthetic was injected in the area of the nerve and spread of the anesthetic was seen on ultrasound in  close proximity thereto.  There were no abnormalities seen on ultrasound; a digital image was taken; and the patient tolerated the procedure with no complications.       Performed by: Aubrey Hwang MD

## 2025-01-17 NOTE — ANESTHESIA PROCEDURE NOTES
Airway  Urgency: elective    Date/Time: 1/17/2025 2:07 PM  Airway not difficult    General Information and Staff    Patient location during procedure: OR    Indications and Patient Condition  Indications for airway management: airway protection    Preoxygenated: yes  MILS not maintained throughout  Mask difficulty assessment: 0 - not attempted    Final Airway Details  Final airway type: endotracheal airway      Successful airway: ETT  Cuffed: yes   Successful intubation technique: video laryngoscopy  Facilitating devices/methods: intubating stylet  Endotracheal tube insertion site: oral  Blade: Hayes  Blade size: 3  ETT size (mm): 7.0  Cormack-Lehane Classification: grade I - full view of glottis  Placement verified by: chest auscultation and capnometry   Measured from: lips  ETT/EBT  to lips (cm): 21  Number of attempts at approach: 1  Assessment: lips, teeth, and gum same as pre-op and atraumatic intubation

## 2025-01-17 NOTE — ANESTHESIA PREPROCEDURE EVALUATION
Anesthesia Evaluation     Patient summary reviewed and Nursing notes reviewed   NPO Solid Status: > 8 hours             Airway   Mallampati: II  TM distance: >3 FB  Neck ROM: full  No difficulty expected  Dental - normal exam     Pulmonary - normal exam   (+) a smoker,  Cardiovascular - normal exam    ECG reviewed    (+) hypertension, hyperlipidemia  (-) angina, ZELAYA      Neuro/Psych- negative ROS  GI/Hepatic/Renal/Endo    (+) obesity, GERD    Musculoskeletal (-) negative ROS    Abdominal  - normal exam    Bowel sounds: normal.   Substance History - negative use     OB/GYN negative ob/gyn ROS         Other                    Anesthesia Plan    ASA 3     general       Anesthetic plan, risks, benefits, and alternatives have been provided, discussed and informed consent has been obtained with: patient.    CODE STATUS:

## 2025-01-20 ENCOUNTER — OFFICE VISIT (OUTPATIENT)
Dept: ORTHOPEDIC SURGERY | Facility: CLINIC | Age: 55
End: 2025-01-20
Payer: COMMERCIAL

## 2025-01-20 VITALS — BODY MASS INDEX: 31.66 KG/M2 | WEIGHT: 197 LBS | RESPIRATION RATE: 18 BRPM | HEIGHT: 66 IN

## 2025-01-20 DIAGNOSIS — Z47.89 ORTHOPEDIC AFTERCARE: Primary | ICD-10-CM

## 2025-01-20 PROCEDURE — 99024 POSTOP FOLLOW-UP VISIT: CPT | Performed by: ORTHOPAEDIC SURGERY

## 2025-01-20 NOTE — PROGRESS NOTES
Patient ID: Radha Tobias is a 54 y.o. female.    1/17/25 right shoulder arthroscopy with supraspinatus repair  Pain controlled    Objective:    LMP  (LMP Unknown)     Physical Examination:    Wounds are well approximated without infection.  Sensory and motor exam are intact in all distributions. Radial pulse is palpable and capillary refill is less than two seconds to all digits.    Imaging:      Assessment:  Doing after cuff repair    Plan:  Wounds were cleaned and redressed. Restrictions discussed.  Begin therapy and see me in 4 weeks.  Remove dressings in two weeks.

## 2025-01-20 NOTE — PATIENT INSTRUCTIONS
Shoulder Arthroscopy: Post- Operative Visit Objectives    Review the operative findings, procedures and photos.  Make sure medications are effective and not causing problems.  Pain: Oxycodone or hydrocodone is for pain. You may take 1 tablet every 6 hours as necessary.  Some patients don’t require the use of these…in those circumstances just use Tylenol extra-strength 1 or 2 tablets every 4-6 hours.   Naproxen 500 mg. For pain and inflammation.  You should take 1 every twice a day.  Do not use Aleve, Ibuprofen, Motrin or Advil during this time.  If you have had any problems stop taking these medicines and please tell us!  Keflex (cephalexin). This is an antibiotic to be taken as a preventive medicine.  Take 1 pill every 6 hours for 1 day. If you have a penicillin allergy this will be replaced by other options.  Wound Care:  Today we will change your dressings and cover your wounds with a plastic covering called Tegaderm. This will allow you to shower immediately.  Remove the tegaderm and underlying dressings in two weeks then ok to get wet in a shower. No Baths or swimming until 4 weeks after surgery.  Keep a towel on the dressing while applying ice.  Exercises and Physical Therapy Remember the protocol is 3 phases:  Healing (6 wks)  Continue the use of the sling for 6 weeks; depending on procedure utilized this may be shorter. You can do gentle range of motion exercise of the elbow and wrist immediately with the arm at the side.  Formal physical therapy will usually start in two weeks.    Rehabilitative (6 wks) You begin a more aggressive phase of physical therapy at the 6 week ester. Light strengthening and a continued emphasis on protecting the repair are important at this stage.  Restorative (4 wks)  Back to your sports and job activities gradually   Follow Up appointments Schedule Follow up visits as directed usually in 4 weeks. Physical therapy will be ordered today and you should receive a phone call or can  schedule yourself if appropriate.  Notes  Make sure you have all necessary notes and documentation for school or work.  Issues: Remember our goal is to make this process smooth and easy if there is any thing you need please ask us or call back [phone]

## 2025-01-22 ENCOUNTER — TREATMENT (OUTPATIENT)
Dept: PHYSICAL THERAPY | Facility: CLINIC | Age: 55
End: 2025-01-22
Payer: COMMERCIAL

## 2025-01-22 DIAGNOSIS — Z98.890 S/P RIGHT ROTATOR CUFF REPAIR: ICD-10-CM

## 2025-01-22 DIAGNOSIS — M25.611 DECREASED ROM OF RIGHT SHOULDER: Primary | ICD-10-CM

## 2025-01-22 NOTE — PROGRESS NOTES
Physical Therapy Initial Evaluation and Plan of Care        [address], [address] [place] [address]     Patient: Radha Tobias   : 1970  Diagnosis/ICD-10 Code:  No primary diagnosis found.  Referring practitioner: Reynold Avila, *  Date of Initial Visit: 2025  Today's Date: 2025  Patient seen for 1 sessions           Subjective Questionnaire: QuickDASH: 80% limited      Subjective Evaluation    History of Present Illness  Mechanism of injury: Pt presents to OP PT s/p 25 right shoulder arthroscopy with supraspinatus repair.  She has been icing several times/day and is wearing the sling with abductor pillow to sleep and during the day.  Her bandages will come off next Friday.        Patient Occupation:  [employer] [facility]  Current pain ratin  At best pain ratin  At worst pain ratin  Location: R shoulder  Quality: pulling and tight  Relieving factors: support, rest, ice and medications  Aggravating factors: movement, lifting, overhead activity, prolonged positioning, repetitive movement, outstretched reach and keyboarding    Social Support  Lives with: spouse    Hand dominance: left    Diagnostic Tests  MRI studies: abnormal    Treatments  Previous treatment: physical therapy and medication  Current treatment: physical therapy  Patient Goals  Patient goals for therapy: independence with ADLs/IADLs, decreased edema, decreased pain, increased motion, increased strength and return to sport/leisure activities  Patient goal: return to cooking/baking         Precautions: anxiety and depression    Objective          Postural Observations  Seated posture: fair  Correction of posture: makes symptoms better    Additional Postural Observation Details  B rounded shoulders     Observations     Right Shoulder  Positive for edema.       Cervical/Thoracic Screen   Cervical range of motion within normal limits    Neurological Testing     Sensation     Shoulder     Right  Shoulder   Intact: Light touch    Active Range of Motion     Additional Active Range of Motion Details  Not tested due to restrictions from surgery    Passive Range of Motion     Right Shoulder   Flexion: 30 degrees     Right Elbow   Flexion: WFL  Extension: WFL  Forearm supination: WFL  Forearm pronation: WFL    Right Wrist   Wrist flexion: WFL  Wrist extension: WFL  Radial deviation: WFL  Ulnar deviation: WFL     Strength/Myotome Testing     Right Wrist/Hand   Wrist extension: 4+  Wrist flexion: 4+      See Exercise, Manual, and Modality Logs for complete treatment.     Assessment & Plan       Assessment  Impairments: abnormal muscle tone, abnormal or restricted ROM, activity intolerance, impaired physical strength, lacks appropriate home exercise program and pain with function   Functional limitations: carrying objects, lifting, pulling, pushing, uncomfortable because of pain, moving in bed, reaching behind back, reaching overhead and unable to perform repetitive tasks   Assessment details: The patient is a 54 y.o. female who presents to physical therapy today s/p 1/17/25 R shoulder arthroscopy with supraspinatus repair. Upon initial evaluation, the patient demonstrates the following impairments: R shoulder ROM, strength and functional deficits secondary to surgery. Due to these impairments, the patient is unable to cook or bake, use R UE, lift, reach OH or behind back. The patient would benefit from skilled PT services to address functional limitations and impairments and to improve patient quality of life.        Goals  Plan Goals: LTG 1: 12 weeks:  The patient will demonstrate 165 degrees of R shoulder flexion, 165 degrees of shoulder abduction, 80 degrees of shoulder external rotation, and 80 degrees of shoulder internal rotation to allow the patient to reach into upper kitchen cabinets and manipulate clothing behind the back with greater ease.    STATUS:  New  STG 1a: 6 weeks:  The patient will demonstrate  125 degrees of R shoulder flexion, 125 degrees of shoulder abduction, 70 degrees of shoulder external rotation, and 70 degrees of shoulder internal rotation.    STATUS:  New    LTG 2: 12 weeks:  The patient will demonstrate 4+/5 strength for R shoulder flexion, abduction, external rotation, and internal rotation in order to demonstrate improved shoulder stability.    STATUS:  New  STG 2a: 8 weeks:  The patient will demonstrate 3+/5 strength for R shoulder flexion, abduction, external rotation, and internal rotation.    STATUS:  New  STG2b:  2 weeks:  The patient will be independent with home exercises.     STATUS:  New     LTG 3: 12 weeks:  The patient will report a pain rating of 1/10 or better in order to improve sleep quality and tolerance to performance of activities of daily living.    STATUS:  New  STG 3a: 8 weeks:  The patient will report a pain rating of 3/10 or better.     STATUS:  New        Plan  Therapy options: will be seen for skilled therapy services  Planned modality interventions: cryotherapy, TENS, thermotherapy (hydrocollator packs), ultrasound and high voltage pulsed current (pain management)  Planned therapy interventions: manual therapy, neuromuscular re-education, postural training, soft tissue mobilization, strengthening, stretching, therapeutic activities, joint mobilization, home exercise program, flexibility, functional ROM exercises and body mechanics training  Frequency: 3x week  Duration in weeks: 12  Treatment plan discussed with: patient  Plan details: 2 times/week first 6 weeks        Visit Diagnoses:    ICD-10-CM ICD-9-CM   1. S/P right rotator cuff repair  Z98.890 V45.89       History # of Personal Factors and/or Comorbidities: MODERATE (1-2)  Examination of Body System(s): # of elements: MODERATE (3)  Clinical Presentation: STABLE   Clinical Decision Making: LOW       Timed:         Manual Therapy:    5     mins  40188;     Therapeutic Exercise:    10     mins  51871;      Neuromuscular Abdiel:        mins  65195;    Therapeutic Activity:          mins  45394;     Gait Training:           mins  39159;     Ultrasound:          mins  55512;    Ionto                                   mins   61876  Self Care                       8     mins   35149  Canalith Repos         mins 43948      Un-Timed:  Electrical Stimulation:         mins  23832 ( );  Dry Needling          mins self-pay  Traction          mins 48150  Low Eval     30     Mins  88179  Mod Eval          Mins  39236  High Eval                            Mins  38702  Re-Eval                               mins  15458    Timed Treatment:   23   mins   Total Treatment:     53   mins    PT SIGNATURE: Varsha Fall PT         Initial Certification  Certification Period: 1/22/2025 thru 4/22/2025  I certify that the therapy services are furnished while this patient is under my care.  The services outlined above are required by this patient, and will be reviewed every 90 days.     PHYSICIAN: Reynold Avila MD      DATE:     Please sign and return via fax to [phone].. Thank you, [facility].

## 2025-01-27 ENCOUNTER — TREATMENT (OUTPATIENT)
Dept: PHYSICAL THERAPY | Facility: CLINIC | Age: 55
End: 2025-01-27
Payer: COMMERCIAL

## 2025-01-27 DIAGNOSIS — M25.611 DECREASED ROM OF RIGHT SHOULDER: Primary | ICD-10-CM

## 2025-01-27 DIAGNOSIS — Z98.890 S/P RIGHT ROTATOR CUFF REPAIR: ICD-10-CM

## 2025-01-27 PROCEDURE — 97110 THERAPEUTIC EXERCISES: CPT | Performed by: PHYSICAL THERAPIST

## 2025-01-27 PROCEDURE — 97014 ELECTRIC STIMULATION THERAPY: CPT | Performed by: PHYSICAL THERAPIST

## 2025-01-27 PROCEDURE — 97140 MANUAL THERAPY 1/> REGIONS: CPT | Performed by: PHYSICAL THERAPIST

## 2025-01-27 PROCEDURE — 97112 NEUROMUSCULAR REEDUCATION: CPT | Performed by: PHYSICAL THERAPIST

## 2025-01-27 NOTE — PROGRESS NOTES
Physical Therapy Daily Treatment Note  [address], [address] [place] [address]     Patient: Radha Tobias   : 1970  Referring practitioner: Reynold Avila, *  Diagnosis:      ICD-10-CM ICD-9-CM   1. Decreased ROM of right shoulder  M25.611 719.51   2. S/P right rotator cuff repair  Z98.890 V45.89     Date of Initial Visit: Type: THERAPY  Noted: 2025  Today's Date: 2025    VISIT#: 2          Subjective   Radha Tobias reports: no pain today and exercises are going well.      Objective   See Exercise, Manual, and Modality Logs for complete treatment.       Assessment & Plan       Assessment  Assessment details: Pt had 90 deg of flexion and abduction PROM in the L shoulder today.  Estim and ice at end of session for decreased pain and swelling.            Progress per Plan of Care           Timed:  Manual Therapy:    10     mins  14638;  Therapeutic Exercise:    10     mins  76520;     Neuromuscular Abdiel:   10    mins  27238;    Therapeutic Activity:          mins  17245;     Gait Training:           mins  13301;     Ultrasound:          mins  19133;    Electrical Stimulation:         mins  84879 ( );    Untimed:  Electrical Stimulation:    20     mins  34341 ( );  Mechanical Traction:         mins  12853;   Dry needling:       Self pay    Timed Treatment:   30   mins   Total Treatment:     50   mins  Varsha Fall PT, DPT, CLT, CIDN  Physical Therapist

## 2025-01-30 ENCOUNTER — TREATMENT (OUTPATIENT)
Dept: PHYSICAL THERAPY | Facility: CLINIC | Age: 55
End: 2025-01-30
Payer: COMMERCIAL

## 2025-01-30 DIAGNOSIS — Z98.890 S/P RIGHT ROTATOR CUFF REPAIR: ICD-10-CM

## 2025-01-30 DIAGNOSIS — M25.611 DECREASED ROM OF RIGHT SHOULDER: Primary | ICD-10-CM

## 2025-01-30 NOTE — PROGRESS NOTES
Physical Therapy Daily Treatment Note  [address], [address] [place] [address]     Patient: Radha Tobias   : 1970  Referring practitioner: Reynold Avila, *  Diagnosis:      ICD-10-CM ICD-9-CM   1. Decreased ROM of right shoulder  M25.611 719.51   2. S/P right rotator cuff repair  Z98.890 V45.89     Date of Initial Visit: Type: THERAPY  Noted: 2025  Today's Date: 2025    VISIT#: 3          Subjective   Radha Tobias reports: no pain today.     Objective   See Exercise, Manual, and Modality Logs for complete treatment.       Assessment & Plan       Assessment  Assessment details: Pt will be 2 weeks post op tomorrow and progressing according to protocol with minimal to no pain in the R shoulder.            Progress per Plan of Care and Progress strengthening /stabilization /functional activity           Timed:  Manual Therapy:    8     mins  89027;  Therapeutic Exercise:    8     mins  94539;     Neuromuscular Abdiel:    8    mins  04533;    Therapeutic Activity:          mins  92724;     Gait Training:           mins  34303;     Ultrasound:          mins  50310;    Electrical Stimulation:         mins  44968 ( );    Untimed:  Electrical Stimulation:   20      mins  80788 ( );  Mechanical Traction:         mins  57693;   Dry needling:       Self pay    Timed Treatment:   24   mins   Total Treatment:     48   mins  Varsha Fall PT, DPT, CLT, CIDN  Physical Therapist

## 2025-02-03 ENCOUNTER — TREATMENT (OUTPATIENT)
Dept: PHYSICAL THERAPY | Facility: CLINIC | Age: 55
End: 2025-02-03
Payer: COMMERCIAL

## 2025-02-03 DIAGNOSIS — Z98.890 S/P RIGHT ROTATOR CUFF REPAIR: ICD-10-CM

## 2025-02-03 DIAGNOSIS — M25.611 DECREASED ROM OF RIGHT SHOULDER: Primary | ICD-10-CM

## 2025-02-03 PROCEDURE — 97140 MANUAL THERAPY 1/> REGIONS: CPT | Performed by: PHYSICAL THERAPIST

## 2025-02-03 PROCEDURE — 97112 NEUROMUSCULAR REEDUCATION: CPT | Performed by: PHYSICAL THERAPIST

## 2025-02-03 PROCEDURE — 97110 THERAPEUTIC EXERCISES: CPT | Performed by: PHYSICAL THERAPIST

## 2025-02-03 PROCEDURE — 97014 ELECTRIC STIMULATION THERAPY: CPT | Performed by: PHYSICAL THERAPIST

## 2025-02-03 NOTE — PROGRESS NOTES
Physical Therapy Daily Treatment Note  [address], [address] [place] [address]     Patient: Radha Tobias   : 1970  Referring practitioner: Reynold Avila, *  Diagnosis:      ICD-10-CM ICD-9-CM   1. Decreased ROM of right shoulder  M25.611 719.51   2. S/P right rotator cuff repair  Z98.890 V45.89     Date of Initial Visit: Type: THERAPY  Noted: 2025  Today's Date: 2/3/2025    VISIT#: 4          Subjective   Radha Tobias reports: no pain today.      Objective   See Exercise, Manual, and Modality Logs for complete treatment.       Assessment & Plan       Assessment  Assessment details: Tolerated session with increased resistance for distal R UE strengthening.  Added forearm stretches.            Progress per Plan of Care           Timed:  Manual Therapy:    8     mins  88443;  Therapeutic Exercise:    10     mins  75732;     Neuromuscular Abdiel:    10    mins  56370;    Therapeutic Activity:          mins  34612;     Gait Training:           mins  11566;     Ultrasound:          mins  62584;    Electrical Stimulation:         mins  21598 ( );    Untimed:  Electrical Stimulation:     20    mins  78168 ( );  Mechanical Traction:         mins  67632;   Dry needling:       Self pay    Timed Treatment:   28   mins   Total Treatment:     48   mins  Varsha Fall PT, DPT, CLT, JOHNN  Physical Therapist

## 2025-02-06 ENCOUNTER — TREATMENT (OUTPATIENT)
Dept: PHYSICAL THERAPY | Facility: CLINIC | Age: 55
End: 2025-02-06
Payer: COMMERCIAL

## 2025-02-06 DIAGNOSIS — Z98.890 S/P RIGHT ROTATOR CUFF REPAIR: ICD-10-CM

## 2025-02-06 DIAGNOSIS — M25.611 DECREASED ROM OF RIGHT SHOULDER: Primary | ICD-10-CM

## 2025-02-06 NOTE — PROGRESS NOTES
Physical Therapy Daily Treatment Note  [address], [address] [place] [address]     Patient: Radha Tobias   : 1970  Referring practitioner: Reynold Avila, *  Diagnosis:      ICD-10-CM ICD-9-CM   1. Decreased ROM of right shoulder  M25.611 719.51   2. S/P right rotator cuff repair  Z98.890 V45.89     Date of Initial Visit: Type: THERAPY  Noted: 2025  Today's Date: 2025    VISIT#: 5          Subjective   Radha Tobias reports:  no pain today and she drove herself here today.      Objective          Passive Range of Motion     Right Shoulder   Flexion: 90 degrees   Abduction: 90 degrees   External rotation 45°: 40 degrees   Internal rotation 45°: 75 degrees       See Exercise, Manual, and Modality Logs for complete treatment.       Assessment & Plan       Assessment  Assessment details: Pt will be 3 weeks post op tomorrow and is progressing according to protocol.  She increased resistance today with distal R UE exercises.            Progress per Plan of Care and Progress strengthening /stabilization /functional activity           Timed:  Manual Therapy:    10     mins  09305;  Therapeutic Exercise:    10     mins  53927;     Neuromuscular Abdiel:    8    mins  56334;    Therapeutic Activity:          mins  45951;     Gait Training:           mins  14398;     Ultrasound:          mins  33805;    Electrical Stimulation:         mins  83758 ( );    Untimed:  Electrical Stimulation:   20      mins  49312 ( );  Mechanical Traction:         mins  78260;   Dry needling:       Self pay    Timed Treatment:   28    mins   Total Treatment:     48   mins  Varsha Fall PT, DPT, CLT, CIDN  Physical Therapist

## 2025-02-10 ENCOUNTER — TREATMENT (OUTPATIENT)
Dept: PHYSICAL THERAPY | Facility: CLINIC | Age: 55
End: 2025-02-10
Payer: COMMERCIAL

## 2025-02-10 DIAGNOSIS — M25.611 DECREASED ROM OF RIGHT SHOULDER: Primary | ICD-10-CM

## 2025-02-10 DIAGNOSIS — Z98.890 S/P RIGHT ROTATOR CUFF REPAIR: ICD-10-CM

## 2025-02-10 DIAGNOSIS — E78.00 PURE HYPERCHOLESTEROLEMIA: ICD-10-CM

## 2025-02-10 PROCEDURE — 97014 ELECTRIC STIMULATION THERAPY: CPT | Performed by: PHYSICAL THERAPIST

## 2025-02-10 PROCEDURE — 97112 NEUROMUSCULAR REEDUCATION: CPT | Performed by: PHYSICAL THERAPIST

## 2025-02-10 PROCEDURE — 97140 MANUAL THERAPY 1/> REGIONS: CPT | Performed by: PHYSICAL THERAPIST

## 2025-02-10 PROCEDURE — 97110 THERAPEUTIC EXERCISES: CPT | Performed by: PHYSICAL THERAPIST

## 2025-02-10 RX ORDER — ROSUVASTATIN CALCIUM 5 MG/1
5 TABLET, COATED ORAL DAILY
Qty: 30 TABLET | Refills: 12 | Status: SHIPPED | OUTPATIENT
Start: 2025-02-10

## 2025-02-10 NOTE — PROGRESS NOTES
Physical Therapy Daily Treatment Note  [address], [address] [place] [address]     Patient: Radha Tobias   : 1970  Referring practitioner: Reynold Avila, *  Diagnosis:      ICD-10-CM ICD-9-CM   1. Decreased ROM of right shoulder  M25.611 719.51   2. S/P right rotator cuff repair  Z98.890 V45.89     Date of Initial Visit: Type: THERAPY  Noted: 2025  Today's Date: 2/10/2025    VISIT#: 6          Subjective   Radha Tobias reports: stiffness in her R shoulder this morning after having to take her shirt off by herself last night.      Objective   See Exercise, Manual, and Modality Logs for complete treatment.       Assessment & Plan       Assessment  Assessment details: Pt will be 4 weeks post op on Friday and is progressing according to protocol.            Progress per Plan of Care and Progress strengthening /stabilization /functional activity           Timed:  Manual Therapy:    8     mins  12432;  Therapeutic Exercise:    8     mins  17593;     Neuromuscular Abdiel:    8    mins  92722;    Therapeutic Activity:          mins  06159;     Gait Training:           mins  22078;     Ultrasound:          mins  96266;    Electrical Stimulation:         mins  75432 ( );    Untimed:  Electrical Stimulation:   20      mins  34236 ( );  Mechanical Traction:         mins  48007;   Dry needling:       Self pay    Timed Treatment:   24   mins   Total Treatment:     44   mins  Varsha Fall PT, DPT, CLT, CIDN  Physical Therapist

## 2025-02-16 DIAGNOSIS — K21.9 GASTROESOPHAGEAL REFLUX DISEASE, UNSPECIFIED WHETHER ESOPHAGITIS PRESENT: ICD-10-CM

## 2025-02-17 ENCOUNTER — TREATMENT (OUTPATIENT)
Dept: PHYSICAL THERAPY | Facility: CLINIC | Age: 55
End: 2025-02-17
Payer: COMMERCIAL

## 2025-02-17 ENCOUNTER — OFFICE VISIT (OUTPATIENT)
Dept: ORTHOPEDIC SURGERY | Facility: CLINIC | Age: 55
End: 2025-02-17
Payer: COMMERCIAL

## 2025-02-17 VITALS — BODY MASS INDEX: 31.66 KG/M2 | HEIGHT: 66 IN | WEIGHT: 197 LBS | OXYGEN SATURATION: 99 %

## 2025-02-17 DIAGNOSIS — Z98.890 S/P RIGHT ROTATOR CUFF REPAIR: ICD-10-CM

## 2025-02-17 DIAGNOSIS — Z47.89 ORTHOPEDIC AFTERCARE: Primary | ICD-10-CM

## 2025-02-17 DIAGNOSIS — M25.611 DECREASED ROM OF RIGHT SHOULDER: Primary | ICD-10-CM

## 2025-02-17 PROCEDURE — 99024 POSTOP FOLLOW-UP VISIT: CPT | Performed by: PHYSICIAN ASSISTANT

## 2025-02-17 PROCEDURE — 97110 THERAPEUTIC EXERCISES: CPT | Performed by: PHYSICAL THERAPIST

## 2025-02-17 PROCEDURE — 97140 MANUAL THERAPY 1/> REGIONS: CPT | Performed by: PHYSICAL THERAPIST

## 2025-02-17 PROCEDURE — 97014 ELECTRIC STIMULATION THERAPY: CPT | Performed by: PHYSICAL THERAPIST

## 2025-02-17 PROCEDURE — 97112 NEUROMUSCULAR REEDUCATION: CPT | Performed by: PHYSICAL THERAPIST

## 2025-02-17 RX ORDER — PANTOPRAZOLE SODIUM 40 MG/1
40 TABLET, DELAYED RELEASE ORAL DAILY
Qty: 30 TABLET | Refills: 0 | Status: SHIPPED | OUTPATIENT
Start: 2025-02-17

## 2025-02-17 NOTE — PROGRESS NOTES
Physical Therapy Daily Treatment Note  [address], [address] [place] [address]     Patient: Radha Tobias   : 1970  Referring practitioner: Reynold Avila, *  Diagnosis:      ICD-10-CM ICD-9-CM   1. Decreased ROM of right shoulder  M25.611 719.51   2. S/P right rotator cuff repair  Z98.890 V45.89     Date of Initial Visit: Type: THERAPY  Noted: 2025  Today's Date: 2025    VISIT#: 7          Subjective   Radha Tobias reports: no pain today.      Objective   See Exercise, Manual, and Modality Logs for complete treatment.       Assessment & Plan       Assessment  Assessment details: Pt tolerated session well with reminders needed to relax R UE during PROM.            Progress per Plan of Care and Progress strengthening /stabilization /functional activity           Timed:  Manual Therapy:    8     mins  57776;  Therapeutic Exercise:    8     mins  49258;     Neuromuscular Abdiel:    8    mins  56649;    Therapeutic Activity:          mins  47115;     Gait Training:           mins  91764;     Ultrasound:          mins  87694;    Electrical Stimulation:         mins  64773 ( );    Untimed:  Electrical Stimulation:      20   mins  77779 ( );  Mechanical Traction:         mins  48629;   Dry needling:       Self pay    Timed Treatment:   24   mins   Total Treatment:     44   mins  Varsha Fall PT, DPT, CLT, CIDN  Physical Therapist

## 2025-02-17 NOTE — PROGRESS NOTES
"   Patient ID: Radha Tobias is a 54 y.o. female returns for 4-week follow-up on 1/17/2025 right shoulder arthroscopy with supraspinatus repair.    History of Present Illness  The patient is a 54-year-old female returning for a follow-up on her right shoulder rotator cuff repair with supraspinatus repair performed on 01/17/2025.    She reports satisfactory progress, including the ability to move her elbow without experiencing any associated numbness or tingling in the digits. She continues to experience mild soreness in the shoulder and has observed bruising on the lateral aspect of the shoulder. She has been diligent in applying ice to the area several times daily and has been adhering to a home exercise program. She has not required any pain medication recently. She is currently on FMLA leave and does not feel ready to return to work at this time. 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 She has been sleeping in a recliner for the past 4 weeks and is seeking advice on whether she can resume sleeping in a flat position.    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      Objective:  Ht 167.6 cm (66\")   Wt 89.4 kg (197 lb)   LMP  (LMP Unknown)   SpO2 99%   BMI 31.80 kg/m²     Physical Examination:       Physical Exam  Full range of motion is observed in the elbow, wrist, and digits.  strength is 5/5. The incision on the shoulder appears well healed with no sutures protruding. There is no effusion in the joint. Mild tenderness is noted over the anterior aspect of the shoulder. A 1 cm area of ecchymosis is present over the lateral aspect of the joint. Passive elevation of the shoulder is 90 degrees and passive abduction is 80 degrees.  Full range of motion at the elbow, wrist, and digits was observed, with  strength at 5/5. The incision is well healed with no sutures sticking out, and " there is no effusion of the joint. Mild tenderness over the anterior aspect of the shoulder and a 1 cm area of ecchymosis over the lateral aspect of the joint were noted.    Imaging:   No new imaging.    Assessment:    Diagnoses and all orders for this visit:    1. Orthopedic aftercare (Primary)    Plan:   Assessment & Plan  1. Post-operative status following right shoulder rotator cuff repair with supraspinatus repair.  She is demonstrating satisfactory progress in her recovery.  She was advised to continue icing the shoulder and performing her home exercise program daily. The sling should be worn for an additional 2 weeks, with discontinuation planned for 03/28/2025.  A work restriction note was provided, indicating that she may return to work on 03/04/2025, but should be limited to desk duty with no lifting. She was also advised to exercise caution while walking on icy or snowy surfaces.    Follow-up  The patient will follow up in 2 months.    PROCEDURE  The patient underwent right shoulder rotator cuff repair with supraspinatus repair on 01/17/2025.  Patient or patient representative verbalized consent for the use of Ambient Listening during the visit with  Amuary Cerda PA-C for chart documentation. 2/17/2025  08:36 EST    Disclaimer: Part of this note may be an electronic transcription/translation of spoken language to printed text using the Dragon Dictation System

## 2025-02-20 ENCOUNTER — TELEPHONE (OUTPATIENT)
Dept: PHYSICAL THERAPY | Facility: CLINIC | Age: 55
End: 2025-02-20

## 2025-02-20 NOTE — TELEPHONE ENCOUNTER
Caller: Radha Tobias    Relationship: Self         What was the call regarding: NOT FEELING WELL

## 2025-02-24 ENCOUNTER — TREATMENT (OUTPATIENT)
Dept: PHYSICAL THERAPY | Facility: CLINIC | Age: 55
End: 2025-02-24
Payer: COMMERCIAL

## 2025-02-24 DIAGNOSIS — Z98.890 S/P RIGHT ROTATOR CUFF REPAIR: ICD-10-CM

## 2025-02-24 DIAGNOSIS — M25.611 DECREASED ROM OF RIGHT SHOULDER: Primary | ICD-10-CM

## 2025-02-24 PROCEDURE — 97140 MANUAL THERAPY 1/> REGIONS: CPT | Performed by: PHYSICAL THERAPIST

## 2025-02-24 PROCEDURE — 97112 NEUROMUSCULAR REEDUCATION: CPT | Performed by: PHYSICAL THERAPIST

## 2025-02-24 PROCEDURE — 97530 THERAPEUTIC ACTIVITIES: CPT | Performed by: PHYSICAL THERAPIST

## 2025-02-24 PROCEDURE — 97014 ELECTRIC STIMULATION THERAPY: CPT | Performed by: PHYSICAL THERAPIST

## 2025-02-24 PROCEDURE — 97110 THERAPEUTIC EXERCISES: CPT | Performed by: PHYSICAL THERAPIST

## 2025-02-24 NOTE — PROGRESS NOTES
Physical Therapy Progress  Note    [facility], [facility] [address] [place] [address]     Patient: Radha Tobias   : 1970  Referring practitioner: Reynold Avila, *  Date of Initial Visit: Type: THERAPY  Noted: 2025  Today's Date: 2025    VISIT#: 8          Subjective   Radha Tobias reports: no pain today in her shoulder.      Objective   See Exercise, Manual, and Modality Logs for complete treatment.       Assessment & Plan       Assessment  Assessment details: Pt will be 6 weeks post op on Friday and is progressing with ROM, but still not to full PROM.  Added prone rows to neutral arm position and supine flexion AAROM per protocol.            Progress per Plan of Care and Progress strengthening /stabilization /functional activity           Timed:  Manual Therapy:    8     mins  47667;  Therapeutic Exercise:    8     mins  13213;     Neuromuscular Abdiel:   8   mins  15316;    Therapeutic Activity:     8     mins  68769;     Gait Training:           mins  08131;     Ultrasound:          mins  19217;    Electrical Stimulation:         mins  20299 ( );    Untimed:  Electrical Stimulation:   20      mins  10822 ( );  Mechanical Traction:         mins  45419;   Dry needling:       Self pay    Timed Treatment:   32   mins   Total Treatment:    52    mins  Varsha Fall, PT, DPT, CLT, CIDN  Physical Therapist

## 2025-02-27 ENCOUNTER — TREATMENT (OUTPATIENT)
Dept: PHYSICAL THERAPY | Facility: CLINIC | Age: 55
End: 2025-02-27
Payer: COMMERCIAL

## 2025-02-27 DIAGNOSIS — M25.611 DECREASED ROM OF RIGHT SHOULDER: Primary | ICD-10-CM

## 2025-02-27 DIAGNOSIS — Z98.890 S/P RIGHT ROTATOR CUFF REPAIR: ICD-10-CM

## 2025-02-27 NOTE — PROGRESS NOTES
Physical Therapy Daily Treatment Note  [address] [facility], [address] [place] [address]     Patient: Radha Tobias   : 1970  Referring practitioner: Reynold Avila, *  Diagnosis:      ICD-10-CM ICD-9-CM   1. Decreased ROM of right shoulder  M25.611 719.51   2. S/P right rotator cuff repair  Z98.890 V45.89     Date of Initial Visit: Type: THERAPY  Noted: 2025  Today's Date: 2025    VISIT#: 9          Subjective   Radha Tobias reports: no pain today in the right shoulder, but feels tight.      Objective   See Exercise, Manual, and Modality Logs for complete treatment.       Assessment & Plan       Assessment  Assessment details: Pt able to progress to next phase of protocol next visit as she will be 6 weeks post op tomorrow.            Progress per Plan of Care and Progress strengthening /stabilization /functional activity           Timed:  Manual Therapy:    8     mins  43919;  Therapeutic Exercise:    10     mins  40318;     Neuromuscular Abdiel:        mins  93130;    Therapeutic Activity:     10     mins  42483;     Gait Training:           mins  56923;     Ultrasound:          mins  84986;    Electrical Stimulation:         mins  63633 ( );    Untimed:  Electrical Stimulation:   20      mins  97730 ( );  Mechanical Traction:         mins  57477;   Dry needling:       Self pay    Timed Treatment:   28   mins   Total Treatment:     48   mins  Varsha Fall PT, DPT, CLT, CIDN  Physical Therapist

## 2025-03-03 ENCOUNTER — TREATMENT (OUTPATIENT)
Dept: PHYSICAL THERAPY | Facility: CLINIC | Age: 55
End: 2025-03-03
Payer: COMMERCIAL

## 2025-03-03 DIAGNOSIS — Z98.890 S/P RIGHT ROTATOR CUFF REPAIR: ICD-10-CM

## 2025-03-03 DIAGNOSIS — M25.611 DECREASED ROM OF RIGHT SHOULDER: Primary | ICD-10-CM

## 2025-03-03 PROCEDURE — 97530 THERAPEUTIC ACTIVITIES: CPT | Performed by: PHYSICAL THERAPIST

## 2025-03-03 PROCEDURE — 97140 MANUAL THERAPY 1/> REGIONS: CPT | Performed by: PHYSICAL THERAPIST

## 2025-03-03 PROCEDURE — 87086 URINE CULTURE/COLONY COUNT: CPT | Performed by: NURSE PRACTITIONER

## 2025-03-03 PROCEDURE — 87186 SC STD MICRODIL/AGAR DIL: CPT | Performed by: NURSE PRACTITIONER

## 2025-03-03 PROCEDURE — 97112 NEUROMUSCULAR REEDUCATION: CPT | Performed by: PHYSICAL THERAPIST

## 2025-03-03 PROCEDURE — 97014 ELECTRIC STIMULATION THERAPY: CPT | Performed by: PHYSICAL THERAPIST

## 2025-03-03 PROCEDURE — 87077 CULTURE AEROBIC IDENTIFY: CPT | Performed by: NURSE PRACTITIONER

## 2025-03-03 PROCEDURE — 97110 THERAPEUTIC EXERCISES: CPT | Performed by: PHYSICAL THERAPIST

## 2025-03-03 NOTE — PROGRESS NOTES
Physical Therapy Daily Treatment Note  [address] [facility], [address] [place] [address]     Patient: Radha Tobias   : 1970  Referring practitioner: Reynold Avila, *  Diagnosis:      ICD-10-CM ICD-9-CM   1. Decreased ROM of right shoulder  M25.611 719.51   2. S/P right rotator cuff repair  Z98.890 V45.89     Date of Initial Visit: Type: THERAPY  Noted: 2025  Today's Date: 3/3/2025    VISIT#: 10          Subjective   Radha Tobias reports: no pain and she has been out of sling since Friday and feels good.      Objective   See Exercise, Manual, and Modality Logs for complete treatment.       Assessment & Plan       Assessment  Assessment details: Pt achieved only 26 degrees of R shoulder external rotation today.            Progress per Plan of Care and Progress strengthening /stabilization /functional activity           Timed:  Manual Therapy:    8     mins  65595;  Therapeutic Exercise:    10     mins  45477;     Neuromuscular Abdiel:    10    mins  32627;    Therapeutic Activity:    10      mins  94484;     Gait Training:           mins  60185;     Ultrasound:          mins  63991;    Electrical Stimulation:         mins  65334 ( );    Untimed:  Electrical Stimulation:    20     mins  54245 ( );  Mechanical Traction:         mins  84013;   Dry needling:       Self pay    Timed Treatment:   38   mins   Total Treatment:     58   mins  Varsha Fall PT, DPT, CLT, CIDN  Physical Therapist

## 2025-03-04 DIAGNOSIS — J30.89 NON-SEASONAL ALLERGIC RHINITIS DUE TO OTHER ALLERGIC TRIGGER: ICD-10-CM

## 2025-03-04 RX ORDER — FLUTICASONE PROPIONATE 50 MCG
1 SPRAY, SUSPENSION (ML) NASAL DAILY
Qty: 16 G | Refills: 12 | Status: CANCELLED | OUTPATIENT
Start: 2025-03-04

## 2025-03-05 RX ORDER — FLUTICASONE PROPIONATE 50 MCG
1 SPRAY, SUSPENSION (ML) NASAL DAILY
Qty: 16 G | Refills: 12 | Status: SHIPPED | OUTPATIENT
Start: 2025-03-05

## 2025-03-06 ENCOUNTER — TREATMENT (OUTPATIENT)
Dept: PHYSICAL THERAPY | Facility: CLINIC | Age: 55
End: 2025-03-06
Payer: COMMERCIAL

## 2025-03-06 DIAGNOSIS — M25.611 DECREASED ROM OF RIGHT SHOULDER: Primary | ICD-10-CM

## 2025-03-06 DIAGNOSIS — Z98.890 S/P RIGHT ROTATOR CUFF REPAIR: ICD-10-CM

## 2025-03-06 NOTE — PROGRESS NOTES
Physical Therapy Daily Treatment Note  [address] [facility], [address] [place] [address]     Patient: Radha Tobias   : 1970  Referring practitioner: Reynold Avila, *  Diagnosis:      ICD-10-CM ICD-9-CM   1. Decreased ROM of right shoulder  M25.611 719.51   2. S/P right rotator cuff repair  Z98.890 V45.89     Date of Initial Visit: Type: THERAPY  Noted: 2025  Today's Date: 3/6/2025    VISIT#: 11          Subjective   Radha oTbias reports: no pain today.      Objective   See Exercise, Manual, and Modality Logs for complete treatment.       Assessment & Plan       Assessment  Assessment details: Pt tolerated session with advancement of scapular strengthening and ROM exercises.            Progress per Plan of Care and Progress strengthening /stabilization /functional activity           Timed:  Manual Therapy:    8     mins  12673;  Therapeutic Exercise:    10     mins  27686;     Neuromuscular Abdiel:    10    mins  41034;    Therapeutic Activity:          mins  03576;     Gait Training:           mins  95801;     Ultrasound:          mins  93987;    Electrical Stimulation:         mins  76981 ( );    Untimed:  Electrical Stimulation:   15      mins  89726 ( );  Mechanical Traction:         mins  75897;   Dry needling:       Self pay    Timed Treatment:   28   mins   Total Treatment:     43   mins  Varsha Fall PT, DPT, CLT, CIDN  Physical Therapist

## 2025-03-06 NOTE — PROGRESS NOTES
"  Chief Complaint   Patient presents with    Annual Exam    Hypertension    Hyperlipidemia         Subjective   Radha Tobias is a 54 y.o. female here for a Annual Visit.     Last Physical Exam: 12/11/23 Previous physical was performed by  Julianne Glez MD  General Health:fair  Nutrition:in general, a \"healthy\" diet    Exercise Level:irregularly  Sleep:fairly well  Hours of Sleep:6-8  Monthly Breast Self Exam:Does not perform monthly breast exam    Pap Smear:  Last Completed Pap Smear            Completed or No Longer Recommended       PAP SMEAR  Discontinued     No completion history exists for this topic.                         Findings on last pap: hysterectomy, no for cancer    Mammogram:   Last Completed Mammogram            Awaiting Completion       MAMMOGRAM (Every 2 Years) Scheduled for 3/31/2025      03/10/2025  Order placed for Mammo Screening Digital Tomosynthesis Bilateral With CAD by Julianne Glez MD    01/03/2024  Mammo Screening Digital Tomosynthesis Bilateral With CAD    11/08/2022  Mammo Screening Digital Tomosynthesis Bilateral With CAD    05/19/2021  Mammo Screening Digital Tomosynthesis Bilateral With CAD    05/10/2017  MAMMO SCREENING BILATERAL W CAD     Only the first 5 history entries have been loaded, but more history exists.                       was done on approximately 01/03/24 and the result was: Birads I (Normal).    Bone Dexa scan: None    Colonoscopy:   Last Completed Colonoscopy            Upcoming       COLORECTAL CANCER SCREENING (COLONOSCOPY - Every 5 Years) Next due on 4/8/2029 04/08/2024  Surgical Procedure: COLONOSCOPY    04/08/2024  Colonoscopy    12/09/2023  Cologuard component of Cologuard - Stool, Per Rectum                         Last colonoscopy was 04/08/24 with Tubular Adenoma results repeat in 5 years         I personally reviewed and updated the patient's allergies, medications, problem list, and past medical, surgical, social, and family history. "     Allergies:  Allergies   Allergen Reactions    Sulfa Antibiotics Palpitations       Social History:  Social History     Socioeconomic History    Marital status:    Tobacco Use    Smoking status: Former     Current packs/day: 0.00     Average packs/day: 0.5 packs/day for 21.9 years (10.9 ttl pk-yrs)     Types: Cigarettes     Start date: 1988     Quit date: 11/23/2009     Years since quitting: 15.3     Passive exposure: Past    Smokeless tobacco: Never   Vaping Use    Vaping status: Never Used   Substance and Sexual Activity    Alcohol use: Yes     Alcohol/week: 2.0 standard drinks of alcohol     Types: 1 Glasses of wine, 1 Shots of liquor per week     Comment: weekly    Drug use: No    Sexual activity: Yes       Family History:  History reviewed. No pertinent family history.    Past Medical History :  Active Ambulatory Problems     Diagnosis Date Noted    Essential hypertension 11/18/2013    Easy bruising 10/18/2017    Mild episode of recurrent major depressive disorder 11/14/2019    Anxiety 11/14/2019    H/O total vaginal hysterectomy 11/14/2019    GERD (gastroesophageal reflux disease) 07/06/2020    Pituitary adenoma 03/23/2021    Low libido 04/20/2021    Pure hypercholesterolemia 05/25/2021    Pain of right scapula 12/17/2021    Rib pain on right side 12/17/2021    Pityriasis rosea 05/06/2022    Class 1 obesity due to excess calories with serious comorbidity and body mass index (BMI) of 32.0 to 32.9 in adult 10/24/2022    Borderline diabetes 10/24/2022    Colon cancer screening 11/06/2023    Chronic rhinitis 12/11/2023    Chronic right shoulder pain 08/05/2024    Complete tear of right rotator cuff 12/09/2024     Resolved Ambulatory Problems     Diagnosis Date Noted    Other chest pain 07/06/2020    Acute non-recurrent maxillary sinusitis 11/23/2020    Urticaria 12/07/2020    Non-recurrent acute serous otitis media of both ears 12/07/2020    Medial epicondylitis of left elbow 03/23/2021    Dysuria  07/16/2021    Vaginal irritation 11/06/2023     Past Medical History:   Diagnosis Date    Anesthesia complication     Depression     Hypertension        Medication List:    Current Outpatient Medications:     escitalopram (LEXAPRO) 10 MG tablet, Take 1 tablet by mouth Daily., Disp: 90 tablet, Rfl: 3    fexofenadine (ALLEGRA) 180 MG tablet, Take 1 tablet by mouth Daily., Disp: 30 tablet, Rfl: 12    fluticasone (FLONASE) 50 MCG/ACT nasal spray, Use 1 spray into the nostril(s) as directed by provider Daily., Disp: 16 g, Rfl: 12    hydrOXYzine (ATARAX) 25 MG tablet, Take 1 tablet by mouth 3 (Three) Times a Day As Needed for Anxiety. (Patient taking differently: Take 1 tablet by mouth 3 (Three) Times a Day As Needed for Anxiety. As needed), Disp: 90 tablet, Rfl: 0    lisinopril-hydrochlorothiazide (PRINZIDE,ZESTORETIC) 20-12.5 MG per tablet, TAKE 1 TABLET BY MOUTH DAILY, Disp: 30 tablet, Rfl: 12    pantoprazole (PROTONIX) 40 MG EC tablet, Take 1 tablet by mouth Daily., Disp: 30 tablet, Rfl: 0    rosuvastatin (CRESTOR) 5 MG tablet, Take 1 tablet by mouth Daily., Disp: 30 tablet, Rfl: 12    tiZANidine (ZANAFLEX) 4 MG tablet, Take 1 tablet by mouth At Night As Needed for Muscle Spasms., Disp: 30 tablet, Rfl: 0    Past Surgical History:  Past Surgical History:   Procedure Laterality Date    APPENDECTOMY      COLONOSCOPY N/A 4/8/2024    Procedure: COLONOSCOPY with polypectomy;  Surgeon: Kelton Jean MD;  Location: [facility] ENDOSCOPY;  Service: Gastroenterology;  Laterality: N/A;  diverticulosis, colon polyps    HYSTERECTOMY      for fibroids, not cancer    SHOULDER ARTHROSCOPY W/ ROTATOR CUFF REPAIR Right 1/17/2025    Procedure: shoulder arthroscopy with rotator cuff repair and associated procedures;  Surgeon: Reynold Avila MD;  Location: [facility] MAIN OR;  Service: Orthopedics;  Laterality: Right;    TUBAL ABDOMINAL LIGATION         Depression Screen:       5/6/2024     9:04 AM   PHQ-2/PHQ-9 Depression Screening  "  Retired Little Interest or Pleasure in Doing Things 0-->not at all    Retired Feeling Down, Depressed or Hopeless 0-->not at all    Retired PHQ-9: Brief Depression Severity Measure Score 0        Data saved with a previous flowsheet row definition       Fall Risk Screen:  PETTY Fall Risk Assessment has not been completed.        Physical Exam:  Vital Signs:  Visit Vitals  /82 (BP Location: Right arm, Patient Position: Sitting, Cuff Size: Adult)   Pulse 97   Temp 98 °F (36.7 °C) (Temporal)   Resp 18   Ht 167.6 cm (66\")   Wt 92.4 kg (203 lb 12.8 oz)   LMP  (LMP Unknown)   SpO2 99% Comment: ra   BMI 32.89 kg/m²       Body mass index is 32.89 kg/m².      Result Review :   The following data was reviewed by: Julianne Glez MD on 03/10/2025:         Brief Urine Lab Results  (Last result in the past 365 days)        Color   Clarity   Blood   Leuk Est   Nitrite   Protein   CREAT   Urine HCG        03/10/25 1027 Yellow   Clear   Negative   Negative   Negative   Negative                       Assessment and Plan:  Problem List Items Addressed This Visit          Cardiac and Vasculature    Essential hypertension    Relevant Orders    CBC & Differential    TSH    Pure hypercholesterolemia          Current Assessment & Plan      She is on crestor  Will check labs  Continue current treatment        Relevant Orders    Comprehensive Metabolic Panel    Lipid Panel With / Chol / HDL Ratio       Endocrine and Metabolic    Class 1 obesity due to excess calories with serious comorbidity and body mass index (BMI) of 32.0 to 32.9 in adult    Current Assessment & Plan   indicates that they are obese (BMI >30) with health conditions that include hypertension and dyslipidemias . Weight is unchanged. BMI  is above average; BMI management plan is completed. We discussed low calorie, low carb based diet program, portion control, and increasing exercise.          Borderline diabetes    Current Assessment & Plan   She is diet " controlled  Will check A1C with labs         Relevant Orders    Hemoglobin A1c     Other Visit Diagnoses         Encounter for routine adult physical exam with abnormal findings    -  Primary    Relevant Orders    POCT urinalysis dipstick, manual (Completed)      Class 1 obesity due to excess calories with serious comorbidity and body mass index (BMI) of 31.0 to 31.9 in adult          Screening mammogram for breast cancer        Relevant Orders    Mammo Screening Digital Tomosynthesis Bilateral With CAD                    An After Visit Summary and PPPS were given to the patient.       Discussed injury prevention, diet and exercise, safe sexual practices, and screening for common diseases. Encouraged use of sunscreen and seatbelts. Discussed timing of  cervical cancer screening. Encouraged monthly self-breast exams, yearly clinical breast exams, and discussed timing of mammograms. Avoidance of tobacco encouraged. Limitation or avoidance of alcohol encouraged. Recommend yearly dental and eye exams. Also discussed monitoring of blood pressure, lipids.         +++++E/M portion medically necessary secondary to new or uncontrolled chronic problem+++++++    Subjective   Radha Tobias is here for:    Chief Complaint   Patient presents with    Annual Exam    Hypertension    Hyperlipidemia       Patient was seen at urgent care on 03/03/25 for acute UTI. She was prescribed Macrobid and pyridium . Patient completed medication yesterday.     Hypertension  This is a chronic problem. The current episode started more than 1 year ago. The problem is unchanged. The problem is controlled. Associated symptoms include anxiety. Pertinent negatives include no chest pain, headaches, malaise/fatigue, palpitations, peripheral edema or shortness of breath. Risk factors for coronary artery disease include dyslipidemia and obesity. Current antihypertension treatment includes ACE inhibitors. The current treatment provides moderate improvement.  There are no compliance problems.    Hyperlipidemia  This is a chronic problem. The current episode started more than 1 year ago. Exacerbating diseases include obesity. She has no history of diabetes. Pertinent negatives include no chest pain or shortness of breath. Current antihyperlipidemic treatment includes statins. The current treatment provides moderate improvement of lipids. Risk factors for coronary artery disease include dyslipidemia, hypertension and obesity.     Many types of weight loss programs attempted with no success. Exercise has not helped with weight loss  There is no family history of MEN tumors or thyroid cancer. Advised GLP1 medications can cause nausea, vomiting, diarrhea, constipation, pancreatitis or gastroparesis. If there is any hoarseness, neck swelling, advised to stop the medication and let me know. If there is severe abdominal pain or intractable vomiting, dehydration, advised to go to the ER    If on semaglutide, discussed possible increased risk of DVT. However, obesity itself puts one at risk for DVT. Discussed if there is swelling, pain or redness in one extremity or chest pain with shortness of breath to discontinue the medication and go to the ER.     Risk vs benefit of compounded medication discussed with patient input. Risks of fatty liver, diabetes, hypertension, osteoarthritis, sleep apnea, DVT's and heart disease with obesity outweighs potential side effects of the medication.     If patient is female and is of child bearing age: Potential risks of increased fertility and birth defects discussed. Discussed and strongly encouraged reliable birth control. When patient is wanting to conceive, discussed and encouraged to stop GLP1 for 90 days prior to starting.       Patient expressed understanding of the above and would like to be on the GLP-1 medication        Physical Exam:  Review of Systems   Constitutional:  Negative for malaise/fatigue.   Respiratory:  Negative for  shortness of breath.    Cardiovascular:  Negative for chest pain and palpitations.        Physical Exam  Vitals and nursing note reviewed.   Constitutional:       General: She is not in acute distress.     Appearance: She is well-developed. She is not diaphoretic.   HENT:      Head: Normocephalic and atraumatic.      Right Ear: Tympanic membrane and external ear normal.      Left Ear: Tympanic membrane and external ear normal.      Nose: Nose normal.      Mouth/Throat:      Pharynx: No oropharyngeal exudate.   Eyes:      General: No scleral icterus.        Right eye: No discharge.         Left eye: No discharge.      Conjunctiva/sclera: Conjunctivae normal.      Pupils: Pupils are equal, round, and reactive to light.   Neck:      Thyroid: No thyromegaly.      Trachea: No tracheal deviation.   Cardiovascular:      Rate and Rhythm: Normal rate and regular rhythm.      Heart sounds: Normal heart sounds. No murmur heard.     No friction rub. No gallop.   Pulmonary:      Effort: Pulmonary effort is normal. No respiratory distress.      Breath sounds: Normal breath sounds. No stridor. No wheezing or rales.   Abdominal:      General: Bowel sounds are normal. There is no distension.      Palpations: Abdomen is soft. There is no mass.      Tenderness: There is no abdominal tenderness. There is no guarding or rebound.   Musculoskeletal:         General: No tenderness or deformity. Normal range of motion.      Cervical back: Normal range of motion and neck supple.   Lymphadenopathy:      Cervical: No cervical adenopathy.   Skin:     General: Skin is warm and dry.      Capillary Refill: Capillary refill takes less than 2 seconds.      Coloration: Skin is not pale.      Findings: No erythema or rash.   Neurological:      Mental Status: She is alert and oriented to person, place, and time.      Cranial Nerves: No cranial nerve deficit.      Sensory: No sensory deficit.      Motor: No tremor, atrophy or abnormal muscle tone.       Coordination: Coordination normal.      Gait: Gait normal.      Deep Tendon Reflexes: Reflexes are normal and symmetric. Reflexes normal.   Psychiatric:         Behavior: Behavior normal.         Thought Content: Thought content normal.         Cognition and Memory: Memory is not impaired. She does not exhibit impaired recent memory or impaired remote memory.         Judgment: Judgment normal.         Assessment and Plan:  Problem List Items Addressed This Visit          Cardiac and Vasculature    Essential hypertension    Relevant Orders    CBC & Differential    TSH    Pure hypercholesterolemia    Current Assessment & Plan     She is on crestor  Will check labs  Continue current treatment         Relevant Orders    Comprehensive Metabolic Panel    Lipid Panel With / Chol / HDL Ratio       Endocrine and Metabolic    Class 1 obesity due to excess calories with serious comorbidity and body mass index (BMI) of 32.0 to 32.9 in adult    Current Assessment & Plan   indicates that they are obese (BMI >30) with health conditions that include hypertension and dyslipidemias . Weight is unchanged. BMI  is above average; BMI management plan is completed. We discussed low calorie, low carb based diet program, portion control, and increasing exercise.          Borderline diabetes    Current Assessment & Plan   She is diet controlled  Will check A1C with labs         Relevant Orders    Hemoglobin A1c     Other Visit Diagnoses         Encounter for routine adult physical exam with abnormal findings    -  Primary    Relevant Orders    POCT urinalysis dipstick, manual (Completed)      Class 1 obesity due to excess calories with serious comorbidity and body mass index (BMI) of 31.0 to 31.9 in adult          Screening mammogram for breast cancer        Relevant Orders    Mammo Screening Digital Tomosynthesis Bilateral With CAD                      An After Visit Summary and PPPS were given to the patient.       Discussed injury  prevention, diet and exercise, safe sexual practices, and screening for common diseases. Encouraged use of sunscreen and seatbelts. Discussed timing of  cervical cancer screening. Encouraged monthly self-breast exams, yearly clinical breast exams, and discussed timing of mammograms. Avoidance of tobacco encouraged. Limitation or avoidance of alcohol encouraged. Recommend yearly dental and eye exams. Also discussed monitoring of blood pressure, lipids.

## 2025-03-10 ENCOUNTER — OFFICE VISIT (OUTPATIENT)
Dept: FAMILY MEDICINE CLINIC | Facility: CLINIC | Age: 55
End: 2025-03-10
Payer: COMMERCIAL

## 2025-03-10 VITALS
RESPIRATION RATE: 18 BRPM | OXYGEN SATURATION: 99 % | SYSTOLIC BLOOD PRESSURE: 130 MMHG | HEIGHT: 66 IN | DIASTOLIC BLOOD PRESSURE: 82 MMHG | TEMPERATURE: 98 F | WEIGHT: 203.8 LBS | HEART RATE: 97 BPM | BODY MASS INDEX: 32.75 KG/M2

## 2025-03-10 DIAGNOSIS — E66.09 CLASS 1 OBESITY DUE TO EXCESS CALORIES WITH SERIOUS COMORBIDITY AND BODY MASS INDEX (BMI) OF 31.0 TO 31.9 IN ADULT: ICD-10-CM

## 2025-03-10 DIAGNOSIS — E66.811 CLASS 1 OBESITY DUE TO EXCESS CALORIES WITH SERIOUS COMORBIDITY AND BODY MASS INDEX (BMI) OF 32.0 TO 32.9 IN ADULT: ICD-10-CM

## 2025-03-10 DIAGNOSIS — Z12.31 SCREENING MAMMOGRAM FOR BREAST CANCER: ICD-10-CM

## 2025-03-10 DIAGNOSIS — E78.00 PURE HYPERCHOLESTEROLEMIA: ICD-10-CM

## 2025-03-10 DIAGNOSIS — R73.03 BORDERLINE DIABETES: ICD-10-CM

## 2025-03-10 DIAGNOSIS — I10 ESSENTIAL HYPERTENSION: ICD-10-CM

## 2025-03-10 DIAGNOSIS — E66.811 CLASS 1 OBESITY DUE TO EXCESS CALORIES WITH SERIOUS COMORBIDITY AND BODY MASS INDEX (BMI) OF 31.0 TO 31.9 IN ADULT: ICD-10-CM

## 2025-03-10 DIAGNOSIS — Z00.01 ENCOUNTER FOR ROUTINE ADULT PHYSICAL EXAM WITH ABNORMAL FINDINGS: Primary | ICD-10-CM

## 2025-03-10 DIAGNOSIS — E66.09 CLASS 1 OBESITY DUE TO EXCESS CALORIES WITH SERIOUS COMORBIDITY AND BODY MASS INDEX (BMI) OF 32.0 TO 32.9 IN ADULT: ICD-10-CM

## 2025-03-13 ENCOUNTER — TREATMENT (OUTPATIENT)
Dept: PHYSICAL THERAPY | Facility: CLINIC | Age: 55
End: 2025-03-13
Payer: COMMERCIAL

## 2025-03-13 DIAGNOSIS — Z98.890 S/P RIGHT ROTATOR CUFF REPAIR: ICD-10-CM

## 2025-03-13 DIAGNOSIS — M25.611 DECREASED ROM OF RIGHT SHOULDER: Primary | ICD-10-CM

## 2025-03-13 NOTE — PROGRESS NOTES
Physical Therapy Daily Treatment Note  [address] [facility], [address] [place] [address]     Patient: Radha Tobias   : 1970  Referring practitioner: Reynold Avila, *  Diagnosis:      ICD-10-CM ICD-9-CM   1. Decreased ROM of right shoulder  M25.611 719.51   2. S/P right rotator cuff repair  Z98.890 V45.89     Date of Initial Visit: Type: THERAPY  Noted: 2025  Today's Date: 3/13/2025    VISIT#: 12          Subjective   Radha Tobias reports: 6/10 pain level in her right biceps during exercises, but no pain at rest.      Objective   See Exercise, Manual, and Modality Logs for complete treatment.       Assessment & Plan       Assessment  Assessment details: Pt will be 8 weeks post op tomorrow and progressing according to protocol.          Progress per Plan of Care and Progress strengthening /stabilization /functional activity           Timed:  Manual Therapy:    8     mins  85341;  Therapeutic Exercise:    8     mins  35716;     Neuromuscular Abdiel:  8    mins  46015;    Therapeutic Activity:    8     mins  57445;     Gait Training:           mins  19661;     Ultrasound:          mins  30295;    Electrical Stimulation:         mins  37090 ( );    Untimed:  Electrical Stimulation: 20        mins  19636 ( );  Mechanical Traction:         mins  22615;   Dry needling:       Self pay    Timed Treatment:   32   mins   Total Treatment:     52   mins  Varsha Fall, PT, DPT, CLT, CIDN  Physical Therapist

## 2025-03-21 ENCOUNTER — TREATMENT (OUTPATIENT)
Dept: PHYSICAL THERAPY | Facility: CLINIC | Age: 55
End: 2025-03-21
Payer: COMMERCIAL

## 2025-03-21 DIAGNOSIS — Z98.890 S/P RIGHT ROTATOR CUFF REPAIR: ICD-10-CM

## 2025-03-21 DIAGNOSIS — M25.611 DECREASED ROM OF RIGHT SHOULDER: Primary | ICD-10-CM

## 2025-03-21 NOTE — PROGRESS NOTES
Physical Therapy Daily Treatment Note  [address] [facility], [address] [place] [address]     Patient: Radha Tobias   : 1970  Referring practitioner: Reynold Avila, *  Diagnosis:      ICD-10-CM ICD-9-CM   1. Decreased ROM of right shoulder  M25.611 719.51   2. S/P right rotator cuff repair  Z98.890 V45.89     Date of Initial Visit: Type: THERAPY  Noted: 2025  Today's Date: 3/21/2025    VISIT#: 13          Subjective   Radha Tobias reports: no pain today and enjoys not having to wear her sling now.      Objective   See Exercise, Manual, and Modality Logs for complete treatment.       Assessment & Plan       Assessment  Assessment details: Pt is 9 weeks post op today and is progressing towards full AAROM in the R shoulder.            Progress per Plan of Care and Progress strengthening /stabilization /functional activity           Timed:  Manual Therapy:    8     mins  39105;  Therapeutic Exercise:    10     mins  75752;     Neuromuscular Abdiel:    10   mins  99470;    Therapeutic Activity:          mins  36440;     Gait Training:           mins  41773;     Ultrasound:          mins  72360;    Electrical Stimulation:         mins  62529 ( );    Untimed:  Electrical Stimulation:    20     mins  70731 ( );  Mechanical Traction:         mins  96272;   Dry needling:       Self pay    Timed Treatment:   28   mins   Total Treatment:     48   mins  Varsha Fall PT, DPT, CLT, CIDN  Physical Therapist

## 2025-03-23 NOTE — ASSESSMENT & PLAN NOTE
indicates that they are obese (BMI >30) with health conditions that include hypertension and dyslipidemias . Weight is unchanged. BMI  is above average; BMI management plan is completed. We discussed low calorie, low carb based diet program, portion control, and increasing exercise.

## 2025-03-26 ENCOUNTER — TREATMENT (OUTPATIENT)
Dept: PHYSICAL THERAPY | Facility: CLINIC | Age: 55
End: 2025-03-26
Payer: COMMERCIAL

## 2025-03-26 DIAGNOSIS — Z98.890 S/P RIGHT ROTATOR CUFF REPAIR: ICD-10-CM

## 2025-03-26 DIAGNOSIS — K21.9 GASTROESOPHAGEAL REFLUX DISEASE, UNSPECIFIED WHETHER ESOPHAGITIS PRESENT: ICD-10-CM

## 2025-03-26 DIAGNOSIS — M25.611 DECREASED ROM OF RIGHT SHOULDER: Primary | ICD-10-CM

## 2025-03-26 RX ORDER — PANTOPRAZOLE SODIUM 40 MG/1
40 TABLET, DELAYED RELEASE ORAL DAILY
Qty: 30 TABLET | Refills: 0 | Status: CANCELLED | OUTPATIENT
Start: 2025-03-26

## 2025-03-26 NOTE — PROGRESS NOTES
Physical Therapy Progress  Note    [facility], [address] [place] [address]     Patient: Radha Tobias   : 1970  Referring practitioner: Reynold Avila, *  Date of Initial Visit: Type: THERAPY  Noted: 2025  Today's Date: 3/26/2025    VISIT#: 14          Subjective Evaluation    Pain  Current pain ratin  At best pain ratin  At worst pain ratin  Location: R shoulder         Radha Tobias reports: no pain today.      Objective          Passive Range of Motion     Right Shoulder   Flexion: 135 degrees   Abduction: 142 degrees     Strength/Myotome Testing     Right Shoulder     Planes of Motion   Flexion: 4+   Extension: 4+   Abduction: 4+   External rotation at 0°: 4+   Internal rotation at 0°: 4+     Additional Strength Details  Seated in neutral with elbow at side and isometric resistance      See Exercise, Manual, and Modality Logs for complete treatment.       Assessment & Plan       Assessment  Assessment details: Pt will be 10 weeks post op on Friday and is progressing with her R shoulder ROM, strength and function.  Pt had 43% limitation on QuickDASH today compared to 80% limitation at initial eval.  She has met goals as noted below and may benefit from continued skilled therapy.  Pt reports she is 75% overall improved since starting therapy, noting increased ROM, strength and no pain.     Goals  Plan Goals: LTG 1: 12 weeks:  The patient will demonstrate 165 degrees of R shoulder flexion, 165 degrees of shoulder abduction, 80 degrees of shoulder external rotation, and 80 degrees of shoulder internal rotation to allow the patient to reach into upper kitchen cabinets and manipulate clothing behind the back with greater ease.    STATUS:  New  STG 1a: 6 weeks:  The patient will demonstrate 125 degrees of R shoulder flexion, 125 degrees of shoulder abduction, 70 degrees of shoulder external rotation, and 70 degrees of shoulder internal rotation.    STATUS:  MET for flexion and  abduction    LTG 2: 12 weeks:  The patient will demonstrate 4+/5 strength for R shoulder flexion, abduction, external rotation, and internal rotation in order to demonstrate improved shoulder stability.    STATUS:  MET  STG 2a: 8 weeks:  The patient will demonstrate 3+/5 strength for R shoulder flexion, abduction, external rotation, and internal rotation.    STATUS:  MET  STG2b:  2 weeks:  The patient will be independent with home exercises.     STATUS:  MET     LTG 3: 12 weeks:  The patient will report a pain rating of 1/10 or better in order to improve sleep quality and tolerance to performance of activities of daily living.    STATUS:  MET  STG 3a: 8 weeks:  The patient will report a pain rating of 3/10 or better.     STATUS:  MET        Plan  Therapy options: will be seen for skilled therapy services          Progress per Plan of Care and Progress strengthening /stabilization /functional activity           Timed:  Manual Therapy:         mins  82722;  Therapeutic Exercise:    8     mins  52291;     Neuromuscular Abdiel:    8    mins  80503;    Therapeutic Activity:      8    mins  67905;     Gait Training:           mins  69674;     Ultrasound:          mins  77043;    Electrical Stimulation:         mins  22900 ( );    Untimed:  Electrical Stimulation:         mins  50180 ( );  Mechanical Traction:         mins  98053;   Dry needling:       Self pay  Re-eval:  8     mins 71160    Timed Treatment:   24   mins   Total Treatment:     32   mins  Varsha Fall PT, DPT, CLT, JOHNN  Physical Therapist

## 2025-03-28 RX ORDER — PANTOPRAZOLE SODIUM 40 MG/1
40 TABLET, DELAYED RELEASE ORAL DAILY
Qty: 30 TABLET | Refills: 0 | Status: SHIPPED | OUTPATIENT
Start: 2025-03-28

## 2025-03-31 ENCOUNTER — HOSPITAL ENCOUNTER (OUTPATIENT)
Dept: MAMMOGRAPHY | Facility: HOSPITAL | Age: 55
Discharge: HOME | End: 2025-03-31
Payer: COMMERCIAL

## 2025-04-01 ENCOUNTER — TREATMENT (OUTPATIENT)
Dept: PHYSICAL THERAPY | Facility: CLINIC | Age: 55
End: 2025-04-01
Payer: COMMERCIAL

## 2025-04-01 DIAGNOSIS — Z98.890 S/P RIGHT ROTATOR CUFF REPAIR: ICD-10-CM

## 2025-04-01 DIAGNOSIS — M25.611 DECREASED ROM OF RIGHT SHOULDER: Primary | ICD-10-CM

## 2025-04-01 PROCEDURE — 97530 THERAPEUTIC ACTIVITIES: CPT | Performed by: PHYSICAL THERAPIST

## 2025-04-01 PROCEDURE — 97112 NEUROMUSCULAR REEDUCATION: CPT | Performed by: PHYSICAL THERAPIST

## 2025-04-01 PROCEDURE — 97110 THERAPEUTIC EXERCISES: CPT | Performed by: PHYSICAL THERAPIST

## 2025-04-01 NOTE — PROGRESS NOTES
Physical Therapy Daily Treatment Note  [address] [facility], [address] [place] [address]     Patient: Radha Tobias   : 1970  Referring practitioner: Reynold Avila, *  Diagnosis:      ICD-10-CM ICD-9-CM   1. Decreased ROM of right shoulder  M25.611 719.51   2. S/P right rotator cuff repair  Z98.890 V45.89     Date of Initial Visit: Type: THERAPY  Noted: 2025  Today's Date: 2025    VISIT#: 15          Subjective   Radha Tobias reports: no pain today.      Objective   See Exercise, Manual, and Modality Logs for complete treatment.       Assessment & Plan       Assessment  Assessment details: Pt will be 11 weeks post op on Friday and is progressing according to protocol.  Added R shoulder isometric strengthening in all planes today.            Progress per Plan of Care and Progress strengthening /stabilization /functional activity           Timed:  Manual Therapy:         mins  28278;  Therapeutic Exercise:    10     mins  25008;     Neuromuscular Abdiel:    10    mins  33268;    Therapeutic Activity:     10     mins  38228;     Gait Training:           mins  63941;     Ultrasound:          mins  21235;    Electrical Stimulation:         mins  49941 ( );    Untimed:  Electrical Stimulation:         mins  96600 ( );  Mechanical Traction:         mins  35013;   Dry needling:       Self pay    Timed Treatment:   30   mins   Total Treatment:     30   mins  Varsha Fall PT, DPT, CLT, CIDN  Physical Therapist

## 2025-04-07 NOTE — PROGRESS NOTES
Subjective   Radha Tobias is a 54 y.o. female. Presents to [facility]    Chief Complaint   Patient presents with    Hyperlipidemia    Hypertension    Blood Sugar Problem    Anxiety     Something PRN as traveling         History of Present Illness  Patient was seen at Urgent Care on 04/24/25 for sore throat . She was prescribed Amoxicillin. Issue has resolved      Last A1C 04/11/2025: 5.71%  Hypertension  This is a chronic problem. The current episode started more than 1 year ago. The problem has been stable since onset. The problem is controlled. Associated symptoms include anxiety. Pertinent negatives include no blurred vision, chest pain, headaches, malaise/fatigue, palpitations, shortness of breath or sweats. Risk factors for coronary artery disease include dyslipidemia and obesity. Current antihypertension treatment includes ACE inhibitors. The current treatment provides moderate improvement.   Hyperlipidemia  This is a chronic problem. The current episode started more than 1 year ago. The problem is controlled. Exacerbating diseases include diabetes and obesity. Pertinent negatives include no chest pain, leg pain or shortness of breath. Current antihyperlipidemic treatment includes statins. The current treatment provides moderate improvement of lipids. Risk factors for coronary artery disease include hypertension, dyslipidemia, post-menopausal and obesity.   Blood Sugar Problem  Symptoms are chronic.   Onset was 1 to 6 months.   Symptoms occur daily.   Pertinent negative symptoms include no abdominal pain, no chest pain, no chills, no congestion, no headaches, no joint swelling, no nausea, no numbness, no visual change and no weakness.   Aggravating factors include nothing.   Anxiety  Presents for follow-up visit.  Symptoms include excessive worry, irritability, nervous/anxious behavior and panic.  Patient reports no chest pain, decreased concentration, depressed mood, dry mouth, nausea,  palpitations, restlessness, shortness of breath, suicidal ideas or malaise/fatigue. Symptoms occur occasionally (when traveling). The patient sleeps 8 hours per night. The quality of sleep is good. Awakens seldom during the night. Treatments tried: has tried hydroxyzine in the past. The treatment provided no relief.      She is going to be traveling. Soon. She has tried hydroxyzine. It did not work. She will not be driving.     I personally reviewed and updated the patient's allergies, medications, problem list, and past medical, surgical, social, and family history. I have reviewed and confirmed the accuracy of the History of Present Illness and Review of Symptoms as documented by the MA/LPN/RN. Julianne Glez MD    Allergies:  Allergies   Allergen Reactions    Sulfa Antibiotics Palpitations       Social History:  Social History     Socioeconomic History    Marital status:    Tobacco Use    Smoking status: Former     Current packs/day: 0.00     Average packs/day: 0.5 packs/day for 21.9 years (10.9 ttl pk-yrs)     Types: Cigarettes     Start date: 1988     Quit date: 11/23/2009     Years since quitting: 15.4     Passive exposure: Past    Smokeless tobacco: Never   Vaping Use    Vaping status: Never Used   Substance and Sexual Activity    Alcohol use: Not Currently     Alcohol/week: 5.0 standard drinks of alcohol     Types: 5 Standard drinks or equivalent per week     Comment: 5 drinks weekly    Drug use: No    Sexual activity: Yes       Family History:  Family History   Problem Relation Age of Onset    Diabetes Mother     Diabetes Brother        Past Medical History :  Patient Active Problem List   Diagnosis    Essential hypertension    Easy bruising    Mild episode of recurrent major depressive disorder    Anxiety    H/O total vaginal hysterectomy    GERD (gastroesophageal reflux disease)    Pituitary adenoma    Low libido    Pure hypercholesterolemia    Pain of right scapula    Rib pain on right side     Pityriasis rosea    Class 1 obesity due to excess calories with serious comorbidity and body mass index (BMI) of 32.0 to 32.9 in adult    Borderline diabetes    Colon cancer screening    Chronic rhinitis    Chronic right shoulder pain    Complete tear of right rotator cuff       Medication List:    Current Outpatient Medications:     escitalopram (LEXAPRO) 10 MG tablet, Take 1 tablet by mouth Daily., Disp: 90 tablet, Rfl: 3    fexofenadine (ALLEGRA) 180 MG tablet, Take 1 tablet by mouth Daily., Disp: 30 tablet, Rfl: 12    fluticasone (FLONASE) 50 MCG/ACT nasal spray, Use 1 spray into the nostril(s) as directed by provider Daily., Disp: 16 g, Rfl: 12    lisinopril-hydrochlorothiazide (PRINZIDE,ZESTORETIC) 20-12.5 MG per tablet, TAKE 1 TABLET BY MOUTH DAILY, Disp: 30 tablet, Rfl: 12    rosuvastatin (CRESTOR) 5 MG tablet, Take 1 tablet by mouth Daily., Disp: 30 tablet, Rfl: 12    tiZANidine (ZANAFLEX) 4 MG tablet, Take 1 tablet by mouth At Night As Needed for Muscle Spasms., Disp: 30 tablet, Rfl: 0    ALPRAZolam (XANAX) 0.25 MG tablet, Take 1 tablet by mouth Every 6 (Six) Hours As Needed for Anxiety. Use sparingly., Disp: 10 tablet, Rfl: 0    pantoprazole (PROTONIX) 40 MG EC tablet, Take 1 tablet by mouth Daily., Disp: 30 tablet, Rfl: 0    Past Surgical History:  Past Surgical History:   Procedure Laterality Date    APPENDECTOMY      COLONOSCOPY N/A 4/8/2024    Procedure: COLONOSCOPY with polypectomy;  Surgeon: Kelton Jean MD;  Location: [facility] ENDOSCOPY;  Service: Gastroenterology;  Laterality: N/A;  diverticulosis, colon polyps    HYSTERECTOMY      for fibroids, not cancer    SHOULDER ARTHROSCOPY W/ ROTATOR CUFF REPAIR Right 1/17/2025    Procedure: shoulder arthroscopy with rotator cuff repair and associated procedures;  Surgeon: Reynold Avila MD;  Location: [facility] MAIN OR;  Service: Orthopedics;  Laterality: Right;    TUBAL ABDOMINAL LIGATION           Physical Exam:      Vital Signs:    Vitals:     "04/14/25 1007   BP: 124/72   Pulse: 92   Resp: 18   Temp: 97.1 °F (36.2 °C)   SpO2: 99%        /72 (BP Location: Right arm, Patient Position: Sitting, Cuff Size: Adult)   Pulse 92   Temp 97.1 °F (36.2 °C) (Temporal)   Resp 18   Ht 167.6 cm (66\")   Wt 91.3 kg (201 lb 3.2 oz)   LMP  (LMP Unknown)   SpO2 99%   BMI 32.47 kg/m²     Wt Readings from Last 3 Encounters:   04/17/25 91.2 kg (201 lb)   04/14/25 91.3 kg (201 lb 3.2 oz)   03/24/25 92.4 kg (203 lb 11.3 oz)       Result Review :   The following data was reviewed by: Julianne Glez MD on 04/14/2025:            Latest Reference Range & Units 04/11/25 07:19 04/11/25 13:48   Sodium 136 - 145 mmol/L  139   Potassium 3.5 - 5.2 mmol/L  3.8   Chloride 98 - 107 mmol/L  100   CO2 22.0 - 29.0 mmol/L  28.6   Anion Gap 5.0 - 15.0 mmol/L  10.4   BUN 6 - 20 mg/dL  17   Creatinine 0.57 - 1.00 mg/dL  0.74   BUN/Creatinine Ratio 7.0 - 25.0   23.0   eGFR >60.0 mL/min/1.73  96.9   Glucose 65 - 99 mg/dL  98   Calcium 8.6 - 10.5 mg/dL  9.9   Alkaline Phosphatase 39 - 117 U/L  99   Total Protein 6.0 - 8.5 g/dL  7.5   Albumin 3.5 - 5.2 g/dL  4.8   Globulin gm/dL  2.7   A/G Ratio g/dL  1.8   AST (SGOT) 1 - 32 U/L  27   ALT (SGPT) 1 - 33 U/L  22   Total Bilirubin 0.0 - 1.2 mg/dL  0.3   Hemoglobin A1C 4.80 - 5.60 % 5.71 (H)    TSH Baseline 0.270 - 4.200 uIU/mL 1.640    Total Cholesterol 0 - 200 mg/dL 178    HDL Cholesterol 40 - 60 mg/dL 68 (H)    LDL Cholesterol  0 - 100 mg/dL 90    VLDL Cholesterol 5 - 40 mg/dL 20    Triglycerides 0 - 150 mg/dL 111    Chol/HDL Ratio  2.62    WBC 3.40 - 10.80 10*3/mm3 7.16    RBC 3.77 - 5.28 10*6/mm3 4.11    Hemoglobin 12.0 - 15.9 g/dL 12.5    Hematocrit 34.0 - 46.6 % 38.5    Platelets 140 - 450 10*3/mm3 259    RDW 12.3 - 15.4 % 13.4    MCV 79.0 - 97.0 fL 93.7    MCH 26.6 - 33.0 pg 30.4    MCHC 31.5 - 35.7 g/dL 32.5    MPV 6.0 - 12.0 fL 9.5    RDW-SD 37.0 - 54.0 fl 46.5    Neutrophil Rel % 42.7 - 76.0 % 54.9    Lymphocyte Rel % 19.6 - 45.3 % " 32.7    Monocyte Rel % 5.0 - 12.0 % 9.5    Eosinophil Rel % 0.3 - 6.2 % 2.2    Basophil Rel % 0.0 - 1.5 % 0.4    Immature Granulocyte Rel % 0.0 - 0.5 % 0.3    Neutrophils Absolute 1.70 - 7.00 10*3/mm3 3.93    Lymphocytes Absolute 0.70 - 3.10 10*3/mm3 2.34    Monocytes Absolute 0.10 - 0.90 10*3/mm3 0.68    Eosinophils Absolute 0.00 - 0.40 10*3/mm3 0.16    Basophils Absolute 0.00 - 0.20 10*3/mm3 0.03    Immature Grans, Absolute 0.00 - 0.05 10*3/mm3 0.02    nRBC 0.0 - 0.2 /100 WBC 0.0    (H): Data is abnormally high    Physical Exam  Vitals reviewed.   Constitutional:       Appearance: Normal appearance. She is well-developed.   HENT:      Head: Normocephalic and atraumatic.   Eyes:      General:         Right eye: No discharge.         Left eye: No discharge.   Cardiovascular:      Rate and Rhythm: Normal rate and regular rhythm.      Heart sounds: Normal heart sounds. No murmur heard.     No friction rub. No gallop.   Pulmonary:      Effort: Pulmonary effort is normal. No respiratory distress.      Breath sounds: Normal breath sounds. No wheezing or rales.   Skin:     General: Skin is warm and dry.      Findings: No rash.   Neurological:      Mental Status: She is alert and oriented to person, place, and time.      Coordination: Coordination normal.      Gait: Gait normal.   Psychiatric:         Behavior: Behavior is cooperative.         Assessment and Plan:  Problems Addressed this Visit          Cardiac and Vasculature    Essential hypertension - Primary    Hypertension is stable and controlled  Continue current treatment regimen.  Weight loss.  Blood pressure will be reassessed in 3 months.         Pure hypercholesterolemia       Endocrine and Metabolic    Class 1 obesity due to excess calories with serious comorbidity and body mass index (BMI) of 32.0 to 32.9 in adult    Patient's (Body mass index is 32.47 kg/m².) indicates that they are obese (BMI >30) with health conditions that include hypertension and  dyslipidemias . Weight is improving with lifestyle modifications. BMI  is above average; BMI management plan is completed. We discussed low calorie, low carb based diet program, portion control, and increasing exercise.             Mental Health    Anxiety    Worse with traveling  She will not be driving.   Will give low dose xanax but advised to not drive.          Relevant Medications    ALPRAZolam (XANAX) 0.25 MG tablet     Diagnoses         Codes Comments      Essential hypertension    -  Primary ICD-10-CM: I10  ICD-9-CM: 401.9       Pure hypercholesterolemia     ICD-10-CM: E78.00  ICD-9-CM: 272.0       Class 1 obesity due to excess calories with serious comorbidity and body mass index (BMI) of 32.0 to 32.9 in adult     ICD-10-CM: E66.811, E66.09, Z68.32  ICD-9-CM: 278.00, V85.32       Anxiety     ICD-10-CM: F41.9  ICD-9-CM: 300.00                          An After Visit Summary and PPPS were given to the patient.       This document is intended for medical expert use only. Reading of this document by patients and/or patient's family without participating medical staff guidance may result in misinterpretation and unintended morbidity. Any interpretation of such data is the responsibility of the patient and/or family member responsible for the patient in concert with their primary or specialist providers, not to be left for sources of online searches such as [employer], [employer] or similar queries. Relying on these approaches to knowledge may result in misinterpretation, misguided goals of care and even death should patients or family members try recommendations outside of the realm of professional medical care.

## 2025-04-08 ENCOUNTER — TREATMENT (OUTPATIENT)
Dept: PHYSICAL THERAPY | Facility: CLINIC | Age: 55
End: 2025-04-08
Payer: COMMERCIAL

## 2025-04-08 DIAGNOSIS — Z98.890 S/P RIGHT ROTATOR CUFF REPAIR: ICD-10-CM

## 2025-04-08 DIAGNOSIS — M25.611 DECREASED ROM OF RIGHT SHOULDER: Primary | ICD-10-CM

## 2025-04-08 PROCEDURE — 97112 NEUROMUSCULAR REEDUCATION: CPT | Performed by: PHYSICAL THERAPIST

## 2025-04-08 PROCEDURE — 97110 THERAPEUTIC EXERCISES: CPT | Performed by: PHYSICAL THERAPIST

## 2025-04-08 PROCEDURE — 97530 THERAPEUTIC ACTIVITIES: CPT | Performed by: PHYSICAL THERAPIST

## 2025-04-08 NOTE — PROGRESS NOTES
Physical Therapy Daily Treatment Note  [address] [facility], [address] [place] [address]     Patient: Radha Tobias   : 1970  Referring practitioner: Reynold Avila, *  Diagnosis:      ICD-10-CM ICD-9-CM   1. Decreased ROM of right shoulder  M25.611 719.51   2. S/P right rotator cuff repair  Z98.890 V45.89     Date of Initial Visit: Type: THERAPY  Noted: 2025  Today's Date: 2025    VISIT#: 16          Subjective   Radha Tobias reports: no pain today, just some soreness from cleaning yesterday.  She was able to get a hair clip in her hair today.      Objective   See Exercise, Manual, and Modality Logs for complete treatment.       Assessment & Plan       Assessment  Assessment details: Increased resistance and reps for increased strengthening today tolerated well.  Pt will be 12 weeks post op on Friday.  Added R shoulder flexion and scaption AROM today with attention needed to maintain proper biomechanics and not hike R shoulder.           Progress per Plan of Care and Progress strengthening /stabilization /functional activity           Timed:  Manual Therapy:         mins  36712;  Therapeutic Exercise:    12     mins  80409;     Neuromuscular Abdiel:    10    mins  28919;    Therapeutic Activity:     10     mins  16645;     Gait Training:           mins  95358;     Ultrasound:          mins  05456;    Electrical Stimulation:         mins  23018 ( );    Untimed:  Electrical Stimulation:         mins  08359 ( );  Mechanical Traction:         mins  25971;   Dry needling:       Self pay    Timed Treatment:   32   mins   Total Treatment:     32   mins  Varsha Fall PT, DPT, CLT, CIDN  Physical Therapist

## 2025-04-11 ENCOUNTER — LAB (OUTPATIENT)
Dept: LAB | Facility: HOSPITAL | Age: 55
End: 2025-04-11
Payer: COMMERCIAL

## 2025-04-11 DIAGNOSIS — M25.611 DECREASED ROM OF RIGHT SHOULDER: ICD-10-CM

## 2025-04-11 DIAGNOSIS — Z98.890 S/P RIGHT ROTATOR CUFF REPAIR: ICD-10-CM

## 2025-04-11 LAB
ALBUMIN SERPL-MCNC: 4.8 G/DL (ref 3.5–5.2)
ALBUMIN/GLOB SERPL: 1.8 G/DL
ALP SERPL-CCNC: 99 U/L (ref 39–117)
ALT SERPL W P-5'-P-CCNC: 22 U/L (ref 1–33)
ANION GAP SERPL CALCULATED.3IONS-SCNC: 10.4 MMOL/L (ref 5–15)
AST SERPL-CCNC: 27 U/L (ref 1–32)
BASOPHILS # BLD AUTO: 0.03 10*3/MM3 (ref 0–0.2)
BASOPHILS NFR BLD AUTO: 0.4 % (ref 0–1.5)
BILIRUB SERPL-MCNC: 0.3 MG/DL (ref 0–1.2)
BUN SERPL-MCNC: 17 MG/DL (ref 6–20)
BUN/CREAT SERPL: 23 (ref 7–25)
CALCIUM SPEC-SCNC: 9.9 MG/DL (ref 8.6–10.5)
CHLORIDE SERPL-SCNC: 100 MMOL/L (ref 98–107)
CHOLEST SERPL-MCNC: 178 MG/DL (ref 0–200)
CO2 SERPL-SCNC: 28.6 MMOL/L (ref 22–29)
CREAT SERPL-MCNC: 0.74 MG/DL (ref 0.57–1)
DEPRECATED RDW RBC AUTO: 46.5 FL (ref 37–54)
EGFRCR SERPLBLD CKD-EPI 2021: 96.9 ML/MIN/1.73
EOSINOPHIL # BLD AUTO: 0.16 10*3/MM3 (ref 0–0.4)
EOSINOPHIL NFR BLD AUTO: 2.2 % (ref 0.3–6.2)
ERYTHROCYTE [DISTWIDTH] IN BLOOD BY AUTOMATED COUNT: 13.4 % (ref 12.3–15.4)
GLOBULIN UR ELPH-MCNC: 2.7 GM/DL
GLUCOSE SERPL-MCNC: 98 MG/DL (ref 65–99)
HBA1C MFR BLD: 5.71 % (ref 4.8–5.6)
HCT VFR BLD AUTO: 38.5 % (ref 34–46.6)
HDLC SERPL QL: 2.62
HDLC SERPL-MCNC: 68 MG/DL (ref 40–60)
HGB BLD-MCNC: 12.5 G/DL (ref 12–15.9)
IMM GRANULOCYTES # BLD AUTO: 0.02 10*3/MM3 (ref 0–0.05)
IMM GRANULOCYTES NFR BLD AUTO: 0.3 % (ref 0–0.5)
LDLC SERPL CALC-MCNC: 90 MG/DL (ref 0–100)
LYMPHOCYTES # BLD AUTO: 2.34 10*3/MM3 (ref 0.7–3.1)
LYMPHOCYTES NFR BLD AUTO: 32.7 % (ref 19.6–45.3)
MCH RBC QN AUTO: 30.4 PG (ref 26.6–33)
MCHC RBC AUTO-ENTMCNC: 32.5 G/DL (ref 31.5–35.7)
MCV RBC AUTO: 93.7 FL (ref 79–97)
MONOCYTES # BLD AUTO: 0.68 10*3/MM3 (ref 0.1–0.9)
MONOCYTES NFR BLD AUTO: 9.5 % (ref 5–12)
NEUTROPHILS NFR BLD AUTO: 3.93 10*3/MM3 (ref 1.7–7)
NEUTROPHILS NFR BLD AUTO: 54.9 % (ref 42.7–76)
NRBC BLD AUTO-RTO: 0 /100 WBC (ref 0–0.2)
PLATELET # BLD AUTO: 259 10*3/MM3 (ref 140–450)
PMV BLD AUTO: 9.5 FL (ref 6–12)
POTASSIUM SERPL-SCNC: 3.8 MMOL/L (ref 3.5–5.2)
PROT SERPL-MCNC: 7.5 G/DL (ref 6–8.5)
RBC # BLD AUTO: 4.11 10*6/MM3 (ref 3.77–5.28)
SODIUM SERPL-SCNC: 139 MMOL/L (ref 136–145)
TRIGL SERPL-MCNC: 111 MG/DL (ref 0–150)
TSH SERPL DL<=0.05 MIU/L-ACNC: 1.64 UIU/ML (ref 0.27–4.2)
VLDLC SERPL-MCNC: 20 MG/DL (ref 5–40)
WBC NRBC COR # BLD AUTO: 7.16 10*3/MM3 (ref 3.4–10.8)

## 2025-04-11 PROCEDURE — 80050 GENERAL HEALTH PANEL: CPT | Performed by: FAMILY MEDICINE

## 2025-04-11 PROCEDURE — 80061 LIPID PANEL: CPT | Performed by: FAMILY MEDICINE

## 2025-04-11 PROCEDURE — 83036 HEMOGLOBIN GLYCOSYLATED A1C: CPT | Performed by: FAMILY MEDICINE

## 2025-04-14 ENCOUNTER — OFFICE VISIT (OUTPATIENT)
Dept: FAMILY MEDICINE CLINIC | Facility: CLINIC | Age: 55
End: 2025-04-14
Payer: COMMERCIAL

## 2025-04-14 VITALS
TEMPERATURE: 97.1 F | OXYGEN SATURATION: 99 % | BODY MASS INDEX: 32.33 KG/M2 | WEIGHT: 201.2 LBS | HEART RATE: 92 BPM | SYSTOLIC BLOOD PRESSURE: 124 MMHG | DIASTOLIC BLOOD PRESSURE: 72 MMHG | RESPIRATION RATE: 18 BRPM | HEIGHT: 66 IN

## 2025-04-14 DIAGNOSIS — E66.09 CLASS 1 OBESITY DUE TO EXCESS CALORIES WITH SERIOUS COMORBIDITY AND BODY MASS INDEX (BMI) OF 32.0 TO 32.9 IN ADULT: ICD-10-CM

## 2025-04-14 DIAGNOSIS — E78.00 PURE HYPERCHOLESTEROLEMIA: ICD-10-CM

## 2025-04-14 DIAGNOSIS — F41.9 ANXIETY: ICD-10-CM

## 2025-04-14 DIAGNOSIS — E66.811 CLASS 1 OBESITY DUE TO EXCESS CALORIES WITH SERIOUS COMORBIDITY AND BODY MASS INDEX (BMI) OF 32.0 TO 32.9 IN ADULT: ICD-10-CM

## 2025-04-14 DIAGNOSIS — I10 ESSENTIAL HYPERTENSION: Primary | ICD-10-CM

## 2025-04-14 RX ORDER — ALPRAZOLAM 0.25 MG
0.25 TABLET ORAL EVERY 6 HOURS PRN
Qty: 10 TABLET | Refills: 0 | Status: SHIPPED | OUTPATIENT
Start: 2025-04-14

## 2025-04-14 NOTE — ASSESSMENT & PLAN NOTE
Worse with traveling  She will not be driving.   Will give low dose xanax but advised to not drive.

## 2025-04-17 ENCOUNTER — OFFICE VISIT (OUTPATIENT)
Dept: ORTHOPEDIC SURGERY | Facility: CLINIC | Age: 55
End: 2025-04-17
Payer: COMMERCIAL

## 2025-04-17 VITALS — OXYGEN SATURATION: 98 % | WEIGHT: 201 LBS | BODY MASS INDEX: 32.3 KG/M2 | HEIGHT: 66 IN

## 2025-04-17 DIAGNOSIS — Z47.89 ORTHOPEDIC AFTERCARE: Primary | ICD-10-CM

## 2025-04-17 PROCEDURE — 99024 POSTOP FOLLOW-UP VISIT: CPT | Performed by: PHYSICIAN ASSISTANT

## 2025-04-17 NOTE — PROGRESS NOTES
"   Patient ID: Radha Tobias is a 54 y.o. female  for [facility]  History of Present Illness  The patient presents for follow-up on right rotator cuff repair.    The chief complaint is the right shoulder arthroscopy with supraspinatus repair performed on 01/17/2025. She has been actively engaged in physical therapy under the guidance of Varsha, with a regimen that includes weightlifting exercises and the use of a belt for downward pulling movements. These exercises are designed to strengthen her back muscles, triceps, and parascapular musculature. She attends physical therapy sessions once a week.     Improvement in her range of motion is reported, as evidenced by her ability to lift her hair at the back, a task she was previously unable to perform due to the rotator cuff tendon issue. No numbness or tingling sensations in her hands are noted. She has a few more physical therapy sessions scheduled, including one on 04/23/2025. Today's session was canceled due to work commitments. She does not have a pulley at home.    PAST SURGICAL HISTORY:  Right shoulder arthroscopy with supraspinatus repair on 01/17/2025.    SOCIAL HISTORY  Exercise: Lifting with weights and using a belt for pulling down, once a week.    Objective:  Ht 167.6 cm (66\")   Wt 91.2 kg (201 lb)   LMP  (LMP Unknown)   SpO2 98%   BMI 32.44 kg/m²     Physical Examination:     Right shoulder:  Physical Exam  Cardiovascular: Right radial pulse is palpable.  Skin: Incision sites appear well healed with no signs of infection or dehiscence.    Musculoskeletal:  Right Shoulder:  - Mild tenderness to palpation over the greater tuberosity.  - Passive forward flexion is 125 degrees with stiffness.  - Passive abduction is at 90 degrees with stiffness and mild pain.  - External rotation is 25 degrees.  - Active internal rotation is S1. Belly press is 5/5. Lift off is 5/5 and pain free.  Right Elbow:  - Range of motion is " good.  Right Wrist:  - Range of motion is good.  Right Hand/Wrist:  - Capillary refill is less than 1 second to all the digits.  - No numbness or tingling in the hands.    Imaging:   None new    Assessment:    Diagnoses and all orders for this visit:    1. Orthopedic aftercare (Primary)    Plan:   Assessment & Plan  1. Postoperative status following right shoulder arthroscopy with supraspinatus repair on 01/17/2025:  Mild tenderness to palpation over the greater tuberosity. Passive forward flexion 125 degrees, passive abduction 90 degrees with stiffness and mild pain, external rotation 25 degrees, active internal rotation S1. Belly press 5/5, lift off 5/5, pain-free.    Continue physical therapy sessions until the next appointment in 6 weeks. Perform additional exercises to supplement the current regimen, including passive stretching on a door frame and light lifting (2-3 pounds). Apply heat before exercises to facilitate muscle relaxation and enhance flexibility. Focus on improving range of motion to prevent muscle fatigue and potential headaches.    Follow-up  Follow up in 6 weeks to reassess range of motion and discuss any necessary changes in restrictions.         Patient or patient representative verbalized consent for the use of Ambient Listening during the visit with  Amaury Cerda PA-C for chart documentation. 4/17/2025  08:25 EDT  Disclaimer: Part of this note may be an electronic transcription/translation of spoken language to printed text using the Dragon Dictation System

## 2025-04-19 DIAGNOSIS — K21.9 GASTROESOPHAGEAL REFLUX DISEASE, UNSPECIFIED WHETHER ESOPHAGITIS PRESENT: ICD-10-CM

## 2025-04-21 RX ORDER — PANTOPRAZOLE SODIUM 40 MG/1
40 TABLET, DELAYED RELEASE ORAL DAILY
Qty: 30 TABLET | Refills: 0 | Status: SHIPPED | OUTPATIENT
Start: 2025-04-21

## 2025-04-23 ENCOUNTER — TREATMENT (OUTPATIENT)
Dept: PHYSICAL THERAPY | Facility: CLINIC | Age: 55
End: 2025-04-23
Payer: COMMERCIAL

## 2025-04-23 DIAGNOSIS — Z98.890 S/P RIGHT ROTATOR CUFF REPAIR: ICD-10-CM

## 2025-04-23 DIAGNOSIS — M25.611 DECREASED ROM OF RIGHT SHOULDER: Primary | ICD-10-CM

## 2025-04-23 PROCEDURE — 97110 THERAPEUTIC EXERCISES: CPT | Performed by: PHYSICAL THERAPIST

## 2025-04-23 PROCEDURE — 97530 THERAPEUTIC ACTIVITIES: CPT | Performed by: PHYSICAL THERAPIST

## 2025-04-23 NOTE — PROGRESS NOTES
Re-Assessment / Re-Certification      Patient: Radha Tobias   : 1970  Diagnosis/ICD-10 Code:  Decreased ROM of right shoulder [M25.611]  Referring practitioner: Reynold Avila, *  Date of Initial Visit: Type: THERAPY  Noted: 2025  Today's Date: 2025  Patient seen for 17 sessions    Visit Diagnoses:      ICD-10-CM ICD-9-CM   1. Decreased ROM of right shoulder  M25.611 719.51   2. S/P right rotator cuff repair  Z98.890 V45.89       Subjective:     Radha Tobias reports overall improvement in shoulder movement, particularly in the last few weeks. She reports most difficulty remains in lifting arm overhead. Reports compliance with current HEP.    Subjective Questionnaire: QuickDASH: 20.45  Clinical Progress: improved  Home Program Compliance: Yes  Treatment has included: therapeutic exercise, neuromuscular re-education, manual therapy, therapeutic activity, and electrical stimulation    Subjective   Objective          Active Range of Motion     Right Shoulder   Flexion: 122 degrees   Abduction: 98 degrees   External rotation 90°: 22 degrees  Internal rotation 90°: 48 degrees       Assessment & Plan       Assessment  Impairments: abnormal or restricted ROM, activity intolerance, impaired physical strength and lacks appropriate home exercise program   Functional limitations: carrying objects, lifting, reaching behind back and reaching overhead   Assessment details: Pt will be 14 weeks post op on 25 and has attended 17 sessions of skilled therapy. She demonstrates improving strength and AROM of RUE and has met multiple goals. Primary remaining limitations are AROM of R shoulder in all planes. QuickDASH score is 20.45 today. Pt requires continued skilled therapy addressing remaining deficits in order to return to PLOF. Plan to continue progression per protocol and based on pt's ability.    Goals  Plan Goals: LTG 1: 12 weeks:  The patient will demonstrate 165 degrees of R shoulder  flexion, 165 degrees of shoulder abduction, 80 degrees of shoulder external rotation, and 80 degrees of shoulder internal rotation to allow the patient to reach into upper kitchen cabinets and manipulate clothing behind the back with greater ease.    STATUS:  NOT MET    STG 1a: 6 weeks:  The patient will demonstrate 125 degrees of R shoulder flexion, 125 degrees of shoulder abduction, 70 degrees of shoulder external rotation, and 70 degrees of shoulder internal rotation.    STATUS:  MET for flexion and abduction    LTG 2: 12 weeks:  The patient will demonstrate 4+/5 strength for R shoulder flexion, abduction, external rotation, and internal rotation in order to demonstrate improved shoulder stability.    STATUS:  MET    STG 2a: 8 weeks:  The patient will demonstrate 3+/5 strength for R shoulder flexion, abduction, external rotation, and internal rotation.    STATUS:  MET    STG2b:  2 weeks:  The patient will be independent with home exercises.     STATUS:  MET     LTG 3: 12 weeks:  The patient will report a pain rating of 1/10 or better in order to improve sleep quality and tolerance to performance of activities of daily living.    STATUS:  MET    STG 3a: 8 weeks:  The patient will report a pain rating of 3/10 or better.     STATUS:  MET    Plan  Therapy options: will be seen for skilled therapy services  Planned modality interventions: cryotherapy and thermotherapy (hydrocollator packs)  Planned therapy interventions: ADL retraining, flexibility, functional ROM exercises, home exercise program, joint mobilization, manual therapy, neuromuscular re-education, soft tissue mobilization, spinal/joint mobilization, strengthening, stretching and therapeutic activities  Frequency: 2x week  Treatment plan discussed with: patient      Progress toward previous goals: Partially Met        Recommendations: Continue as planned  Timeframe: 3 months  Prognosis to achieve goals: good        Timed:         Manual Therapy:          mins  35675;     Therapeutic Exercise:    30     mins  37882;     Neuromuscular Abdiel:        mins  81734;    Therapeutic Activity:     12     mins  78367;     Gait Training:           mins  84009;     Ultrasound:          mins  46757;    Ionto                                   mins   58447  Self Care                            mins   17986    Un-Timed:  Electrical Stimulation:         mins  58578 ( );  Traction          mins 78270  Re-Eval                               mins  57247      Timed Treatment:   42   mins   Total Treatment:     42   mins       PT Signature: Keyona Mccray PT  [place] License #: [id number]  [place] License #: [id number]      Certification Period: 4/23/2025 thru 7/21/2025  I certify that the therapy services are furnished while this patient is under my care.  The services outlined above are required by this patient, and will be reviewed every 90 days.    Based upon review of the patient's progress and continued therapy plan, it is my medical opinion that Radha Tobias should continue physical therapy treatment at [facility] [address] [place] [address] PHYSICAL THERAPY  [address] [place] [address] [place] [address] [phone].

## 2025-04-24 NOTE — ASSESSMENT & PLAN NOTE
Patient's (Body mass index is 32.47 kg/m².) indicates that they are obese (BMI >30) with health conditions that include hypertension and dyslipidemias . Weight is improving with lifestyle modifications. BMI  is above average; BMI management plan is completed. We discussed low calorie, low carb based diet program, portion control, and increasing exercise.

## 2025-05-07 ENCOUNTER — TREATMENT (OUTPATIENT)
Dept: PHYSICAL THERAPY | Facility: CLINIC | Age: 55
End: 2025-05-07
Payer: COMMERCIAL

## 2025-05-07 DIAGNOSIS — M25.611 DECREASED ROM OF RIGHT SHOULDER: Primary | ICD-10-CM

## 2025-05-07 DIAGNOSIS — Z98.890 S/P RIGHT ROTATOR CUFF REPAIR: ICD-10-CM

## 2025-05-07 NOTE — PROGRESS NOTES
Physical Therapy Daily Treatment Note  [address] [facility], [address] [place] [address]     Patient: Radha Tobias   : 1970  Referring practitioner: Reynold Avila, *  Diagnosis:      ICD-10-CM ICD-9-CM   1. Decreased ROM of right shoulder  M25.611 719.51   2. S/P right rotator cuff repair  Z98.890 V45.89     Date of Initial Visit: Type: THERAPY  Noted: 2025  Today's Date: 2025    VISIT#: 18          Subjective   Radha Tobias reports: no pain today.      Objective   See Exercise, Manual, and Modality Logs for complete treatment.       Assessment & Plan       Assessment  Assessment details: Focused on ROM and strength in L shoulder today with good tolerance.              Progress per Plan of Care and Progress strengthening /stabilization /functional activity           Timed:  Manual Therapy:         mins  00938;  Therapeutic Exercise:    10     mins  63513;     Neuromuscular Abdiel:   10     mins  11318;    Therapeutic Activity:     8     mins  77569;     Gait Training:           mins  23281;     Ultrasound:          mins  09435;    Electrical Stimulation:         mins  19481 ( );    Untimed:  Electrical Stimulation:         mins  22448 ( );  Mechanical Traction:         mins  48274;   Dry needling:       Self pay    Timed Treatment:   28   mins   Total Treatment:     28   mins  Varsha Fall PT, DPT, CLT, JOHNN  Physical Therapist

## 2025-05-14 ENCOUNTER — TREATMENT (OUTPATIENT)
Dept: PHYSICAL THERAPY | Facility: CLINIC | Age: 55
End: 2025-05-14
Payer: COMMERCIAL

## 2025-05-14 DIAGNOSIS — M25.611 DECREASED ROM OF RIGHT SHOULDER: Primary | ICD-10-CM

## 2025-05-14 DIAGNOSIS — Z98.890 S/P RIGHT ROTATOR CUFF REPAIR: ICD-10-CM

## 2025-05-14 NOTE — PROGRESS NOTES
Physical Therapy Daily Treatment Note  [address] [facility], [address] [place] [address]     Patient: Radha Tobias   : 1970  Referring practitioner: Reynold Avila, *  Diagnosis:      ICD-10-CM ICD-9-CM   1. Decreased ROM of right shoulder  M25.611 719.51   2. S/P right rotator cuff repair  Z98.890 V45.89     Date of Initial Visit: Type: THERAPY  Noted: 2025  Today's Date: 2025    VISIT#: 19          Subjective   Radha Tobias reports: some tightness in her R shoulder today.      Objective   See Exercise, Manual, and Modality Logs for complete treatment.       Assessment & Plan       Assessment  Assessment details: Incorporated stretches more to improve motion and flexibility, while decreasing end-range pain.            Progress per Plan of Care and Progress strengthening /stabilization /functional activity           Timed:  Manual Therapy:     8    mins  22212;  Therapeutic Exercise:    10     mins  21629;     Neuromuscular Abdiel:    10    mins  13234;    Therapeutic Activity:     10     mins  07818;     Gait Training:           mins  46534;     Ultrasound:          mins  72879;    Electrical Stimulation:         mins  95313 ( );    Untimed:  Electrical Stimulation:         mins  23799 ( );  Mechanical Traction:         mins  87343;   Dry needling:       Self pay    Timed Treatment:   38   mins   Total Treatment:     38   mins  Varsha Fall PT, DPT, CLT, CIDN  Physical Therapist

## 2025-05-19 DIAGNOSIS — F33.0 MILD EPISODE OF RECURRENT MAJOR DEPRESSIVE DISORDER: ICD-10-CM

## 2025-05-19 DIAGNOSIS — F41.9 ANXIETY: ICD-10-CM

## 2025-05-19 RX ORDER — ESCITALOPRAM OXALATE 10 MG/1
10 TABLET ORAL DAILY
Qty: 90 TABLET | Refills: 3 | Status: SHIPPED | OUTPATIENT
Start: 2025-05-19

## 2025-05-21 ENCOUNTER — TREATMENT (OUTPATIENT)
Dept: PHYSICAL THERAPY | Facility: CLINIC | Age: 55
End: 2025-05-21
Payer: COMMERCIAL

## 2025-05-21 DIAGNOSIS — Z98.890 S/P RIGHT ROTATOR CUFF REPAIR: ICD-10-CM

## 2025-05-21 DIAGNOSIS — M25.611 DECREASED ROM OF RIGHT SHOULDER: Primary | ICD-10-CM

## 2025-05-21 NOTE — PROGRESS NOTES
Physical Therapy Daily Treatment Note  [address] [facility], [address] [place] [address]     Patient: Radha Tobias   : 1970  Referring practitioner: Reynold Avila, *  Diagnosis:      ICD-10-CM ICD-9-CM   1. Decreased ROM of right shoulder  M25.611 719.51   2. S/P right rotator cuff repair  Z98.890 V45.89     Date of Initial Visit: Type: THERAPY  Noted: 2025  Today's Date: 2025    VISIT#: 20          Subjective   Radha Tobias reports: she has some soreness in her right shoulder/biceps mm today after cleaning her RV.      Objective   See Exercise, Manual, and Modality Logs for complete treatment.       Assessment & Plan       Assessment  Assessment details: Progressed strengthening with good tolerance.            Progress per Plan of Care and Progress strengthening /stabilization /functional activity           Timed:  Manual Therapy:         mins  79837;  Therapeutic Exercise:    10     mins  07516;     Neuromuscular Abdiel:    10    mins  67341;    Therapeutic Activity:     10     mins  21356;     Gait Training:           mins  51226;     Ultrasound:          mins  86152;    Electrical Stimulation:         mins  08360 ( );    Untimed:  Electrical Stimulation:         mins  83680 ( );  Mechanical Traction:         mins  82012;   Dry needling:       Self pay    Timed Treatment: 30     mins   Total Treatment:     30   mins  Varsha Fall PT, DPT, CLT, JOHNN  Physical Therapist

## 2025-05-28 ENCOUNTER — TREATMENT (OUTPATIENT)
Dept: PHYSICAL THERAPY | Facility: CLINIC | Age: 55
End: 2025-05-28
Payer: COMMERCIAL

## 2025-05-28 DIAGNOSIS — Z98.890 S/P RIGHT ROTATOR CUFF REPAIR: ICD-10-CM

## 2025-05-28 DIAGNOSIS — M25.611 DECREASED ROM OF RIGHT SHOULDER: Primary | ICD-10-CM

## 2025-05-29 ENCOUNTER — OFFICE VISIT (OUTPATIENT)
Dept: ORTHOPEDIC SURGERY | Facility: CLINIC | Age: 55
End: 2025-05-29
Payer: COMMERCIAL

## 2025-05-29 VITALS — WEIGHT: 201 LBS | HEIGHT: 66 IN | BODY MASS INDEX: 32.3 KG/M2 | OXYGEN SATURATION: 99 %

## 2025-05-29 DIAGNOSIS — Z47.89 ORTHOPEDIC AFTERCARE: Primary | ICD-10-CM

## 2025-05-29 NOTE — PROGRESS NOTES
"     Patient ID: Radha Tobias is a 55 y.o. female  History of Present Illness  The patient is a 55-year-old female who returns today for follow-up on her right shoulder arthroscopy with supraspinatus repair performed on 01/17/2025.    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 However, she experiences intermittent muscle soreness. She has been engaging in physical therapy sessions with Frida Mccray, the most recent of which was yesterday. Her home exercise regimen includes the application of heat prior to exercises and the use of ice post-exercise.    PAST SURGICAL HISTORY:  Right shoulder arthroscopy with supraspinatus repair on 01/17/2025.      Objective:  Ht 167.6 cm (66\")   Wt 91.2 kg (201 lb)   LMP  (LMP Unknown)   SpO2 99%   BMI 32.44 kg/m²     Physical Examination:  Physical Exam  Musculoskeletal:  Right upper extremity: Right radial pulse is palpable. Capillary refill in all the digits is less than 1 second. Range of motion in the right digits, wrist, and elbow is grossly intact.  Right shoulder: Intact skin and well-healed incisions, with possible trace atrophy. No tenderness to palpation. Passive forward flexion 135 degrees. Passive abduction 115 degrees with stiffness. External rotation 55 degrees with stiffness. Active internal rotation reaches L4. Belly press strength 5/5, lift off strength 4/5. Negative Speed's test. No weakness with St. Bernard's test, supraspinatus/Priscilla's test.    Imaging:    Assessment:  Diagnoses and all orders for this visit:    1. Orthopedic aftercare (Primary)    Plan:  Assessment & Plan  1. Post-operative status following right shoulder arthroscopy with supraspinatus repair:    Continue the home exercise program, incorporating heat application prior to exercises and ice post-exercise. Focus on improving range of motion, particularly in passive forward flexion and abduction. Stretching exercises " can be integrated into daily activities, such as stretching the arm against a wall. Physical therapy is no longer necessary, and self-directed exercises are encouraged. Compliance with the exercise regimen is expected to yield further improvements.    Follow-up  Follow up in early August 2025 for a final recheck.    Patient or patient representative verbalized consent for the use of [employer]ient List[employer] during the visit with  Amaury Cerda PA-C for chart documentation. 5/29/2025  08:37 EDT  Procedures

## 2025-06-02 DIAGNOSIS — K21.9 GASTROESOPHAGEAL REFLUX DISEASE, UNSPECIFIED WHETHER ESOPHAGITIS PRESENT: ICD-10-CM

## 2025-06-03 RX ORDER — PANTOPRAZOLE SODIUM 40 MG/1
40 TABLET, DELAYED RELEASE ORAL DAILY
Qty: 30 TABLET | Refills: 2 | Status: SHIPPED | OUTPATIENT
Start: 2025-06-03

## 2025-07-16 ENCOUNTER — HOSPITAL ENCOUNTER (OUTPATIENT)
Dept: MAMMOGRAPHY | Facility: HOSPITAL | Age: 55
Discharge: HOME | End: 2025-07-16
Admitting: FAMILY MEDICINE
Payer: COMMERCIAL

## 2025-07-16 DIAGNOSIS — Z12.31 SCREENING MAMMOGRAM FOR BREAST CANCER: ICD-10-CM

## 2025-07-16 PROCEDURE — 77067 SCR MAMMO BI INCL CAD: CPT

## 2025-07-16 PROCEDURE — 77063 BREAST TOMOSYNTHESIS BI: CPT

## 2025-08-12 DIAGNOSIS — I10 ESSENTIAL HYPERTENSION: ICD-10-CM

## 2025-08-12 RX ORDER — LISINOPRIL AND HYDROCHLOROTHIAZIDE 12.5; 2 MG/1; MG/1
1 TABLET ORAL DAILY
Qty: 30 TABLET | Refills: 12 | Status: ON HOLD | OUTPATIENT
Start: 2025-08-12

## 2025-08-17 ENCOUNTER — APPOINTMENT (OUTPATIENT)
Dept: GENERAL RADIOLOGY | Facility: HOSPITAL | Age: 55
End: 2025-08-17
Payer: COMMERCIAL

## 2025-08-17 ENCOUNTER — HOSPITAL ENCOUNTER (OUTPATIENT)
Facility: HOSPITAL | Age: 55
Setting detail: OBSERVATION
LOS: 1 days | Discharge: HOME | End: 2025-08-20
Attending: STUDENT IN AN ORGANIZED HEALTH CARE EDUCATION/TRAINING PROGRAM | Admitting: FAMILY MEDICINE
Payer: COMMERCIAL

## 2025-08-18 ENCOUNTER — APPOINTMENT (OUTPATIENT)
Dept: CT IMAGING | Facility: HOSPITAL | Age: 55
End: 2025-08-18
Payer: COMMERCIAL

## 2025-08-18 ENCOUNTER — APPOINTMENT (OUTPATIENT)
Dept: CARDIOLOGY | Facility: HOSPITAL | Age: 55
End: 2025-08-18
Payer: COMMERCIAL

## 2025-08-18 ENCOUNTER — OFFICE (OUTPATIENT)
Dept: URBAN - METROPOLITAN AREA CLINIC 64 | Facility: CLINIC | Age: 55
End: 2025-08-18

## 2025-08-18 PROBLEM — R07.9 CHEST PAIN AT REST: Status: ACTIVE | Noted: 2020-07-06

## 2025-08-18 PROBLEM — J18.9 COMMUNITY ACQUIRED PNEUMONIA: Status: ACTIVE | Noted: 2025-08-18

## 2025-08-19 ENCOUNTER — APPOINTMENT (OUTPATIENT)
Dept: NUCLEAR MEDICINE | Facility: HOSPITAL | Age: 55
End: 2025-08-19
Payer: COMMERCIAL

## 2025-08-20 ENCOUNTER — READMISSION MANAGEMENT (OUTPATIENT)
Dept: CALL CENTER | Facility: HOSPITAL | Age: 55
End: 2025-08-20
Payer: COMMERCIAL

## 2025-08-20 PROBLEM — J18.9 PNEUMONIA, UNSPECIFIED ORGANISM: Status: ACTIVE | Noted: 2025-08-20

## 2025-08-21 ENCOUNTER — TRANSITIONAL CARE MANAGEMENT TELEPHONE ENCOUNTER (OUTPATIENT)
Dept: CALL CENTER | Facility: HOSPITAL | Age: 55
End: 2025-08-21
Payer: COMMERCIAL

## 2025-08-26 ENCOUNTER — TELEPHONE (OUTPATIENT)
Dept: FAMILY MEDICINE CLINIC | Facility: CLINIC | Age: 55
End: 2025-08-26
Payer: COMMERCIAL

## 2025-08-26 ENCOUNTER — OFFICE VISIT (OUTPATIENT)
Dept: FAMILY MEDICINE CLINIC | Facility: CLINIC | Age: 55
End: 2025-08-26
Payer: COMMERCIAL

## 2025-08-26 VITALS
RESPIRATION RATE: 17 BRPM | BODY MASS INDEX: 33.56 KG/M2 | HEART RATE: 90 BPM | SYSTOLIC BLOOD PRESSURE: 126 MMHG | HEIGHT: 66 IN | TEMPERATURE: 97.7 F | OXYGEN SATURATION: 99 % | WEIGHT: 208.8 LBS | DIASTOLIC BLOOD PRESSURE: 86 MMHG

## 2025-08-26 DIAGNOSIS — E66.09 CLASS 1 OBESITY DUE TO EXCESS CALORIES WITH SERIOUS COMORBIDITY AND BODY MASS INDEX (BMI) OF 33.0 TO 33.9 IN ADULT: ICD-10-CM

## 2025-08-26 DIAGNOSIS — J18.9 COMMUNITY ACQUIRED PNEUMONIA, UNSPECIFIED LATERALITY: ICD-10-CM

## 2025-08-26 DIAGNOSIS — R73.03 BORDERLINE DIABETES: ICD-10-CM

## 2025-08-26 DIAGNOSIS — Z09 HOSPITAL DISCHARGE FOLLOW-UP: Primary | ICD-10-CM

## 2025-08-26 DIAGNOSIS — I10 ESSENTIAL HYPERTENSION: ICD-10-CM

## 2025-08-26 DIAGNOSIS — J45.21 MILD INTERMITTENT REACTIVE AIRWAY DISEASE WITH ACUTE EXACERBATION: ICD-10-CM

## 2025-08-26 DIAGNOSIS — J18.9 PNEUMONIA DUE TO INFECTIOUS ORGANISM, UNSPECIFIED LATERALITY, UNSPECIFIED PART OF LUNG: ICD-10-CM

## 2025-08-26 DIAGNOSIS — E66.811 CLASS 1 OBESITY DUE TO EXCESS CALORIES WITH SERIOUS COMORBIDITY AND BODY MASS INDEX (BMI) OF 33.0 TO 33.9 IN ADULT: ICD-10-CM

## 2025-08-26 DIAGNOSIS — E78.00 PURE HYPERCHOLESTEROLEMIA: ICD-10-CM

## 2025-08-26 PROBLEM — J45.901 REACTIVE AIRWAY DISEASE WITH ACUTE EXACERBATION: Status: ACTIVE | Noted: 2025-08-26

## 2025-08-26 RX ORDER — ALBUTEROL SULFATE 90 UG/1
2 INHALANT RESPIRATORY (INHALATION) EVERY 4 HOURS PRN
Qty: 6.7 G | Refills: 5 | Status: SHIPPED | OUTPATIENT
Start: 2025-08-26

## 2025-08-27 ENCOUNTER — LAB (OUTPATIENT)
Dept: LAB | Facility: HOSPITAL | Age: 55
End: 2025-08-27
Payer: COMMERCIAL

## 2025-08-27 ENCOUNTER — TELEPHONE (OUTPATIENT)
Dept: CARDIOLOGY | Facility: CLINIC | Age: 55
End: 2025-08-27
Payer: COMMERCIAL

## 2025-08-27 LAB
ALBUMIN SERPL-MCNC: 4.1 G/DL (ref 3.5–5.2)
ALBUMIN/GLOB SERPL: 1.5 G/DL
ALP SERPL-CCNC: 101 U/L (ref 39–117)
ALT SERPL W P-5'-P-CCNC: 21 U/L (ref 1–33)
ANION GAP SERPL CALCULATED.3IONS-SCNC: 11 MMOL/L (ref 5–15)
AST SERPL-CCNC: 23 U/L (ref 1–32)
BILIRUB SERPL-MCNC: 0.2 MG/DL (ref 0–1.2)
BUN SERPL-MCNC: 14 MG/DL (ref 6–20)
BUN/CREAT SERPL: 16.3 (ref 7–25)
CALCIUM SPEC-SCNC: 9.9 MG/DL (ref 8.6–10.5)
CHLORIDE SERPL-SCNC: 107 MMOL/L (ref 98–107)
CHOLEST SERPL-MCNC: 132 MG/DL (ref 0–200)
CO2 SERPL-SCNC: 24 MMOL/L (ref 22–29)
CREAT SERPL-MCNC: 0.86 MG/DL (ref 0.57–1)
DEPRECATED RDW RBC AUTO: 45.1 FL (ref 37–54)
EGFRCR SERPLBLD CKD-EPI 2021: 80.4 ML/MIN/1.73
EOSINOPHIL # BLD MANUAL: 0.07 10*3/MM3 (ref 0–0.4)
EOSINOPHIL NFR BLD MANUAL: 1 % (ref 0.3–6.2)
ERYTHROCYTE [DISTWIDTH] IN BLOOD BY AUTOMATED COUNT: 13 % (ref 12.3–15.4)
GLOBULIN UR ELPH-MCNC: 2.8 GM/DL
GLUCOSE SERPL-MCNC: 112 MG/DL (ref 65–99)
HBA1C MFR BLD: 5.8 % (ref 4.8–5.6)
HCT VFR BLD AUTO: 37.8 % (ref 34–46.6)
HDLC SERPL QL: 2.69
HDLC SERPL-MCNC: 49 MG/DL (ref 40–60)
HGB BLD-MCNC: 12 G/DL (ref 12–15.9)
LDLC SERPL CALC-MCNC: 53 MG/DL (ref 0–100)
LYMPHOCYTES # BLD MANUAL: 3.18 10*3/MM3 (ref 0.7–3.1)
LYMPHOCYTES NFR BLD MANUAL: 6 % (ref 5–12)
MCH RBC QN AUTO: 29.9 PG (ref 26.6–33)
MCHC RBC AUTO-ENTMCNC: 31.7 G/DL (ref 31.5–35.7)
MCV RBC AUTO: 94.3 FL (ref 79–97)
MONOCYTES # BLD: 0.41 10*3/MM3 (ref 0.1–0.9)
NEUTROPHILS # BLD AUTO: 3.11 10*3/MM3 (ref 1.7–7)
NEUTROPHILS NFR BLD MANUAL: 42 % (ref 42.7–76)
NEUTS BAND NFR BLD MANUAL: 4 % (ref 0–5)
PLAT MORPH BLD: NORMAL
PLATELET # BLD AUTO: 236 10*3/MM3 (ref 140–450)
PMV BLD AUTO: 9.9 FL (ref 6–12)
POLYCHROMASIA BLD QL SMEAR: ABNORMAL
POTASSIUM SERPL-SCNC: 4.8 MMOL/L (ref 3.5–5.2)
PROT SERPL-MCNC: 6.9 G/DL (ref 6–8.5)
RBC # BLD AUTO: 4.01 10*6/MM3 (ref 3.77–5.28)
SCAN SLIDE: NORMAL
SODIUM SERPL-SCNC: 142 MMOL/L (ref 136–145)
STOMATOCYTES BLD QL SMEAR: ABNORMAL
TRIGL SERPL-MCNC: 185 MG/DL (ref 0–150)
TSH SERPL DL<=0.05 MIU/L-ACNC: 2.62 UIU/ML (ref 0.27–4.2)
VARIANT LYMPHS NFR BLD MANUAL: 14 % (ref 0–5)
VARIANT LYMPHS NFR BLD MANUAL: 33 % (ref 19.6–45.3)
VLDLC SERPL-MCNC: 30 MG/DL (ref 5–40)
WBC MORPH BLD: NORMAL
WBC NRBC COR # BLD AUTO: 6.76 10*3/MM3 (ref 3.4–10.8)

## 2025-08-27 PROCEDURE — 83036 HEMOGLOBIN GLYCOSYLATED A1C: CPT | Performed by: FAMILY MEDICINE

## 2025-08-27 PROCEDURE — 80050 GENERAL HEALTH PANEL: CPT | Performed by: FAMILY MEDICINE

## 2025-08-27 PROCEDURE — 80061 LIPID PANEL: CPT | Performed by: FAMILY MEDICINE

## 2025-08-27 PROCEDURE — 85007 BL SMEAR W/DIFF WBC COUNT: CPT | Performed by: FAMILY MEDICINE

## 2025-08-27 PROCEDURE — 36415 COLL VENOUS BLD VENIPUNCTURE: CPT | Performed by: FAMILY MEDICINE

## 2025-08-28 ENCOUNTER — TELEPHONE (OUTPATIENT)
Dept: FAMILY MEDICINE CLINIC | Facility: CLINIC | Age: 55
End: 2025-08-28
Payer: COMMERCIAL

## 2025-08-28 DIAGNOSIS — R19.7 DIARRHEA, UNSPECIFIED TYPE: Primary | ICD-10-CM

## 2025-08-31 PROBLEM — J18.9 PNEUMONIA, UNSPECIFIED ORGANISM: Status: RESOLVED | Noted: 2025-08-20 | Resolved: 2025-08-31

## (undated) DEVICE — THE STERILE CAMERA HANDLE COVER IS FOR USE WITH THE STERIS SURGICAL LIGHTING AND VISUALIZATION SYSTEMS.

## (undated) DEVICE — BLD SHAVER EXCALIBUR CRV 4MM

## (undated) DEVICE — SOL IRR NACL 0.9PCT BO 1000ML

## (undated) DEVICE — GLV SURG SIGNATURE ESSENTIAL PF LTX SZ8.5

## (undated) DEVICE — TRAP WIDEEYE POLYP

## (undated) DEVICE — GLV SURG SENSICARE PI ORTHO SZ8 LF STRL

## (undated) DEVICE — SPNG GZ AVANT 6PLY 4X4IN STRL PK/2

## (undated) DEVICE — CANN PASSPORT BUTN 8MM 3CM

## (undated) DEVICE — SUTURELASSO CRV 25D RT

## (undated) DEVICE — TBG ARTHSCP PUMP W CONN/REDUC 8FT

## (undated) DEVICE — SUT 1/0 27 PDS II VIO MONO CT Z353H

## (undated) DEVICE — KT SURG TURNOVER 050

## (undated) DEVICE — PK SHLDR ARTHROSCOPY 50

## (undated) DEVICE — TUBING, SUCTION, 1/4" X 12', STRAIGHT: Brand: MEDLINE

## (undated) DEVICE — SUTURELASSO CRV 25D LT

## (undated) DEVICE — PAD,ABDOMINAL,5"X9",STERILE,LF,1/PK: Brand: MEDLINE INDUSTRIES, INC.

## (undated) DEVICE — CANN TRPL DAM 7X7MM

## (undated) DEVICE — THE STERILE LIGHT HANDLE COVER IS USED WITH STERIS SURGICAL LIGHTING AND VISUALIZATION SYSTEMS.

## (undated) DEVICE — TBG ARTHSCP DUALWAVE

## (undated) DEVICE — TBG ARTHSCP PT W CONN/REDUC 8FT

## (undated) DEVICE — ANTIBACTERIAL UNDYED BRAIDED (POLYGLACTIN 910), SYNTHETIC ABSORBABLE SUTURE: Brand: COATED VICRYL

## (undated) DEVICE — SNAR POLYP HOTSNARE/BRAIDED OVL/MINI 7F 2.8X10MM 230CM 1P/U

## (undated) DEVICE — ABL ASP APOLLORF I90 90DEG

## (undated) DEVICE — UNDRGLV SURG BIOGEL PIMICROINDICATOR SYNTH SZ8 LF STRL

## (undated) DEVICE — SOL IRR H2O BO 1000ML STRL

## (undated) DEVICE — GOWN,SLEEVE,STERILE,W/CSR WRAP,1/P: Brand: MEDLINE

## (undated) DEVICE — SOL IRR NACL 0.9PCT ARTHROMATIC 3000ML

## (undated) DEVICE — UNDERGLV SURG BIOGEL/PI PF SYNTH SURG SZ8.5 BLU 50/BX

## (undated) DEVICE — GLV SURG SENSICARE PI ORTHO SZ7.5 LF STRL

## (undated) DEVICE — PK ENDO GI 50